# Patient Record
Sex: MALE | Race: WHITE | NOT HISPANIC OR LATINO | URBAN - METROPOLITAN AREA
[De-identification: names, ages, dates, MRNs, and addresses within clinical notes are randomized per-mention and may not be internally consistent; named-entity substitution may affect disease eponyms.]

---

## 2019-01-01 ENCOUNTER — APPOINTMENT (OUTPATIENT)
Dept: RADIOLOGY | Facility: MEDICAL CENTER | Age: 64
DRG: 871 | End: 2019-01-01
Attending: INTERNAL MEDICINE
Payer: MEDICAID

## 2019-01-01 ENCOUNTER — OFFICE VISIT (OUTPATIENT)
Dept: PULMONOLOGY | Facility: HOSPICE | Age: 64
End: 2019-01-01
Payer: MEDICAID

## 2019-01-01 ENCOUNTER — HOSPITAL ENCOUNTER (OUTPATIENT)
Dept: RADIOLOGY | Facility: MEDICAL CENTER | Age: 64
End: 2019-05-23

## 2019-01-01 ENCOUNTER — HOSPITAL ENCOUNTER (OUTPATIENT)
Dept: RADIOLOGY | Facility: MEDICAL CENTER | Age: 64
End: 2019-08-01

## 2019-01-01 ENCOUNTER — TELEPHONE (OUTPATIENT)
Dept: PULMONOLOGY | Facility: HOSPICE | Age: 64
End: 2019-01-01

## 2019-01-01 ENCOUNTER — APPOINTMENT (OUTPATIENT)
Dept: RADIOLOGY | Facility: MEDICAL CENTER | Age: 64
DRG: 871 | End: 2019-01-01
Attending: HOSPITALIST
Payer: MEDICAID

## 2019-01-01 ENCOUNTER — APPOINTMENT (OUTPATIENT)
Dept: CARDIOLOGY | Facility: MEDICAL CENTER | Age: 64
DRG: 871 | End: 2019-01-01
Attending: EMERGENCY MEDICINE
Payer: MEDICAID

## 2019-01-01 ENCOUNTER — HOSPITAL ENCOUNTER (INPATIENT)
Facility: MEDICAL CENTER | Age: 64
LOS: 4 days | DRG: 871 | End: 2019-10-05
Attending: EMERGENCY MEDICINE | Admitting: HOSPITALIST
Payer: MEDICAID

## 2019-01-01 ENCOUNTER — NON-PROVIDER VISIT (OUTPATIENT)
Dept: URGENT CARE | Facility: PHYSICIAN GROUP | Age: 64
End: 2019-01-01

## 2019-01-01 ENCOUNTER — APPOINTMENT (OUTPATIENT)
Dept: RADIOLOGY | Facility: MEDICAL CENTER | Age: 64
DRG: 871 | End: 2019-01-01
Attending: STUDENT IN AN ORGANIZED HEALTH CARE EDUCATION/TRAINING PROGRAM
Payer: MEDICAID

## 2019-01-01 ENCOUNTER — APPOINTMENT (OUTPATIENT)
Dept: RADIOLOGY | Facility: MEDICAL CENTER | Age: 64
DRG: 871 | End: 2019-01-01
Attending: EMERGENCY MEDICINE
Payer: MEDICAID

## 2019-01-01 ENCOUNTER — APPOINTMENT (OUTPATIENT)
Dept: PULMONOLOGY | Facility: HOSPICE | Age: 64
End: 2019-01-01
Payer: MEDICAID

## 2019-01-01 VITALS
WEIGHT: 315 LBS | OXYGEN SATURATION: 98 % | DIASTOLIC BLOOD PRESSURE: 65 MMHG | TEMPERATURE: 98.4 F | BODY MASS INDEX: 41.75 KG/M2 | SYSTOLIC BLOOD PRESSURE: 108 MMHG | HEART RATE: 109 BPM | HEIGHT: 73 IN | RESPIRATION RATE: 13 BRPM

## 2019-01-01 VITALS
RESPIRATION RATE: 16 BRPM | HEART RATE: 96 BPM | HEIGHT: 73 IN | TEMPERATURE: 98.6 F | DIASTOLIC BLOOD PRESSURE: 82 MMHG | SYSTOLIC BLOOD PRESSURE: 126 MMHG | OXYGEN SATURATION: 91 % | WEIGHT: 280 LBS | BODY MASS INDEX: 37.11 KG/M2

## 2019-01-01 VITALS
DIASTOLIC BLOOD PRESSURE: 94 MMHG | WEIGHT: 287 LBS | HEIGHT: 72 IN | RESPIRATION RATE: 18 BRPM | TEMPERATURE: 98.2 F | BODY MASS INDEX: 38.87 KG/M2 | SYSTOLIC BLOOD PRESSURE: 134 MMHG | HEART RATE: 89 BPM | OXYGEN SATURATION: 92 %

## 2019-01-01 DIAGNOSIS — I10 HYPERTENSION, UNSPECIFIED TYPE: ICD-10-CM

## 2019-01-01 DIAGNOSIS — J96.02 ACUTE RESPIRATORY FAILURE WITH HYPOXIA AND HYPERCAPNIA (HCC): ICD-10-CM

## 2019-01-01 DIAGNOSIS — G47.33 OSA (OBSTRUCTIVE SLEEP APNEA): ICD-10-CM

## 2019-01-01 DIAGNOSIS — J43.9 MIXED RESTRICTIVE AND OBSTRUCTIVE LUNG DISEASE (HCC): ICD-10-CM

## 2019-01-01 DIAGNOSIS — R06.02 SOB (SHORTNESS OF BREATH): ICD-10-CM

## 2019-01-01 DIAGNOSIS — E66.9 CLASS 2 OBESITY WITH BODY MASS INDEX (BMI) OF 36.0 TO 36.9 IN ADULT, UNSPECIFIED OBESITY TYPE, UNSPECIFIED WHETHER SERIOUS COMORBIDITY PRESENT: ICD-10-CM

## 2019-01-01 DIAGNOSIS — R60.0 BILATERAL LOWER EXTREMITY EDEMA: ICD-10-CM

## 2019-01-01 DIAGNOSIS — J98.4 MIXED RESTRICTIVE AND OBSTRUCTIVE LUNG DISEASE (HCC): ICD-10-CM

## 2019-01-01 DIAGNOSIS — J96.01 ACUTE RESPIRATORY FAILURE WITH HYPOXIA AND HYPERCAPNIA (HCC): ICD-10-CM

## 2019-01-01 DIAGNOSIS — J96.01 ACUTE RESPIRATORY FAILURE WITH HYPOXIA (HCC): Primary | ICD-10-CM

## 2019-01-01 DIAGNOSIS — R57.9 SHOCK (HCC): ICD-10-CM

## 2019-01-01 DIAGNOSIS — G47.19 EXCESSIVE DAYTIME SLEEPINESS: ICD-10-CM

## 2019-01-01 DIAGNOSIS — Z02.1 PRE-EMPLOYMENT DRUG SCREENING: ICD-10-CM

## 2019-01-01 LAB
ACTION RANGE TRIGGERED IACRT: NO
ACTION RANGE TRIGGERED IACRT: YES
ALBUMIN SERPL BCP-MCNC: 3 G/DL (ref 3.2–4.9)
ALBUMIN SERPL BCP-MCNC: 3.8 G/DL (ref 3.2–4.9)
ALBUMIN/GLOB SERPL: 1.3 G/DL
ALBUMIN/GLOB SERPL: 1.4 G/DL
ALP SERPL-CCNC: 125 U/L (ref 30–99)
ALP SERPL-CCNC: 90 U/L (ref 30–99)
ALT SERPL-CCNC: 14 U/L (ref 2–50)
ALT SERPL-CCNC: 25 U/L (ref 2–50)
AMMONIA PLAS-SCNC: 95 UMOL/L (ref 11–45)
ANION GAP SERPL CALC-SCNC: 10 MMOL/L (ref 0–11.9)
ANION GAP SERPL CALC-SCNC: 10 MMOL/L (ref 0–11.9)
ANION GAP SERPL CALC-SCNC: 12 MMOL/L (ref 0–11.9)
ANION GAP SERPL CALC-SCNC: 14 MMOL/L (ref 0–11.9)
ANION GAP SERPL CALC-SCNC: 15 MMOL/L (ref 0–11.9)
ANION GAP SERPL CALC-SCNC: 17 MMOL/L (ref 0–11.9)
ANISOCYTOSIS BLD QL SMEAR: ABNORMAL
APPEARANCE FLD: NORMAL
APPEARANCE UR: CLEAR
AST SERPL-CCNC: 12 U/L (ref 12–45)
AST SERPL-CCNC: 17 U/L (ref 12–45)
BACTERIA #/AREA URNS HPF: NEGATIVE /HPF
BACTERIA BRONCH AEROBE CULT: ABNORMAL
BASE EXCESS BLDA CALC-SCNC: -10 MMOL/L (ref -4–3)
BASE EXCESS BLDA CALC-SCNC: -2 MMOL/L (ref -4–3)
BASE EXCESS BLDA CALC-SCNC: -3 MMOL/L (ref -4–3)
BASE EXCESS BLDA CALC-SCNC: -4 MMOL/L (ref -4–3)
BASE EXCESS BLDA CALC-SCNC: -5 MMOL/L (ref -4–3)
BASE EXCESS BLDA CALC-SCNC: -6 MMOL/L (ref -4–3)
BASE EXCESS BLDA CALC-SCNC: -6 MMOL/L (ref -4–3)
BASE EXCESS BLDA CALC-SCNC: -7 MMOL/L (ref -4–3)
BASE EXCESS BLDA CALC-SCNC: -8 MMOL/L (ref -4–3)
BASE EXCESS BLDA CALC-SCNC: -9 MMOL/L (ref -4–3)
BASOPHILS # BLD AUTO: 0 % (ref 0–1.8)
BASOPHILS # BLD AUTO: 0.3 % (ref 0–1.8)
BASOPHILS # BLD: 0 K/UL (ref 0–0.12)
BASOPHILS # BLD: 0.04 K/UL (ref 0–0.12)
BILIRUB SERPL-MCNC: 1.3 MG/DL (ref 0.1–1.5)
BILIRUB SERPL-MCNC: 1.6 MG/DL (ref 0.1–1.5)
BILIRUB UR QL STRIP.AUTO: ABNORMAL
BODY FLD TYPE: NORMAL
BODY TEMPERATURE: 34.1 CENTIGRADE
BODY TEMPERATURE: ABNORMAL DEGREES
BUN SERPL-MCNC: 27 MG/DL (ref 8–22)
BUN SERPL-MCNC: 30 MG/DL (ref 8–22)
BUN SERPL-MCNC: 37 MG/DL (ref 8–22)
BUN SERPL-MCNC: 41 MG/DL (ref 8–22)
BUN SERPL-MCNC: 50 MG/DL (ref 8–22)
BUN SERPL-MCNC: 59 MG/DL (ref 8–22)
BURR CELLS BLD QL SMEAR: NORMAL
CA-I BLD ISE-SCNC: 1.03 MMOL/L (ref 1.1–1.3)
CALCIUM SERPL-MCNC: 6.5 MG/DL (ref 8.5–10.5)
CALCIUM SERPL-MCNC: 7 MG/DL (ref 8.5–10.5)
CALCIUM SERPL-MCNC: 7.1 MG/DL (ref 8.5–10.5)
CALCIUM SERPL-MCNC: 7.9 MG/DL (ref 8.5–10.5)
CALCIUM SERPL-MCNC: 8.2 MG/DL (ref 8.5–10.5)
CALCIUM SERPL-MCNC: 8.5 MG/DL (ref 8.5–10.5)
CHLORIDE SERPL-SCNC: 102 MMOL/L (ref 96–112)
CHLORIDE SERPL-SCNC: 103 MMOL/L (ref 96–112)
CHLORIDE SERPL-SCNC: 103 MMOL/L (ref 96–112)
CHLORIDE SERPL-SCNC: 104 MMOL/L (ref 96–112)
CHLORIDE SERPL-SCNC: 105 MMOL/L (ref 96–112)
CHLORIDE SERPL-SCNC: 107 MMOL/L (ref 96–112)
CO2 BLDA-SCNC: 19 MMOL/L (ref 20–33)
CO2 BLDA-SCNC: 19 MMOL/L (ref 20–33)
CO2 BLDA-SCNC: 20 MMOL/L (ref 20–33)
CO2 BLDA-SCNC: 21 MMOL/L (ref 20–33)
CO2 BLDA-SCNC: 22 MMOL/L (ref 20–33)
CO2 BLDA-SCNC: 23 MMOL/L (ref 20–33)
CO2 BLDA-SCNC: 23 MMOL/L (ref 20–33)
CO2 BLDA-SCNC: 24 MMOL/L (ref 20–33)
CO2 BLDA-SCNC: 25 MMOL/L (ref 20–33)
CO2 BLDA-SCNC: 25 MMOL/L (ref 20–33)
CO2 BLDA-SCNC: 26 MMOL/L (ref 20–33)
CO2 BLDA-SCNC: 27 MMOL/L (ref 20–33)
CO2 SERPL-SCNC: 18 MMOL/L (ref 20–33)
CO2 SERPL-SCNC: 20 MMOL/L (ref 20–33)
CO2 SERPL-SCNC: 21 MMOL/L (ref 20–33)
CO2 SERPL-SCNC: 22 MMOL/L (ref 20–33)
CO2 SERPL-SCNC: 22 MMOL/L (ref 20–33)
CO2 SERPL-SCNC: 23 MMOL/L (ref 20–33)
COLOR FLD: NORMAL
COLOR UR: ABNORMAL
CORTIS SERPL-MCNC: 32 UG/DL (ref 0–23)
CREAT SERPL-MCNC: 0.94 MG/DL (ref 0.5–1.4)
CREAT SERPL-MCNC: 1.71 MG/DL (ref 0.5–1.4)
CREAT SERPL-MCNC: 2.21 MG/DL (ref 0.5–1.4)
CREAT SERPL-MCNC: 2.29 MG/DL (ref 0.5–1.4)
CREAT SERPL-MCNC: 2.82 MG/DL (ref 0.5–1.4)
CREAT SERPL-MCNC: 3.42 MG/DL (ref 0.5–1.4)
CRP SERPL HS-MCNC: 28.09 MG/DL (ref 0–0.75)
CSF COMMENTS 1658: NORMAL
CYTOLOGY REG CYTOL: NORMAL
EKG IMPRESSION: NORMAL
EOSINOPHIL # BLD AUTO: 0 K/UL (ref 0–0.51)
EOSINOPHIL # BLD AUTO: 0.15 K/UL (ref 0–0.51)
EOSINOPHIL NFR BLD: 0 % (ref 0–6.9)
EOSINOPHIL NFR BLD: 0.9 % (ref 0–6.9)
EPI CELLS #/AREA URNS HPF: NEGATIVE /HPF
ERYTHROCYTE [DISTWIDTH] IN BLOOD BY AUTOMATED COUNT: 63.7 FL (ref 35.9–50)
ERYTHROCYTE [DISTWIDTH] IN BLOOD BY AUTOMATED COUNT: 65.8 FL (ref 35.9–50)
ERYTHROCYTE [DISTWIDTH] IN BLOOD BY AUTOMATED COUNT: 70.3 FL (ref 35.9–50)
ERYTHROCYTE [DISTWIDTH] IN BLOOD BY AUTOMATED COUNT: 70.5 FL (ref 35.9–50)
ERYTHROCYTE [DISTWIDTH] IN BLOOD BY AUTOMATED COUNT: 70.5 FL (ref 35.9–50)
FLUAV RNA SPEC QL NAA+PROBE: NEGATIVE
FLUBV RNA SPEC QL NAA+PROBE: NEGATIVE
FUNGUS SPEC FUNGUS STN: NORMAL
FUNGUS SPEC FUNGUS STN: NORMAL
GLOBULIN SER CALC-MCNC: 2.3 G/DL (ref 1.9–3.5)
GLOBULIN SER CALC-MCNC: 2.7 G/DL (ref 1.9–3.5)
GLUCOSE BLD-MCNC: 127 MG/DL (ref 65–99)
GLUCOSE BLD-MCNC: 127 MG/DL (ref 65–99)
GLUCOSE BLD-MCNC: 128 MG/DL (ref 65–99)
GLUCOSE BLD-MCNC: 132 MG/DL (ref 65–99)
GLUCOSE BLD-MCNC: 138 MG/DL (ref 65–99)
GLUCOSE BLD-MCNC: 142 MG/DL (ref 65–99)
GLUCOSE BLD-MCNC: 146 MG/DL (ref 65–99)
GLUCOSE BLD-MCNC: 147 MG/DL (ref 65–99)
GLUCOSE BLD-MCNC: 152 MG/DL (ref 65–99)
GLUCOSE BLD-MCNC: 155 MG/DL (ref 65–99)
GLUCOSE BLD-MCNC: 156 MG/DL (ref 65–99)
GLUCOSE BLD-MCNC: 159 MG/DL (ref 65–99)
GLUCOSE BLD-MCNC: 160 MG/DL (ref 65–99)
GLUCOSE BLD-MCNC: 162 MG/DL (ref 65–99)
GLUCOSE BLD-MCNC: 171 MG/DL (ref 65–99)
GLUCOSE BLD-MCNC: 225 MG/DL (ref 65–99)
GLUCOSE SERPL-MCNC: 148 MG/DL (ref 65–99)
GLUCOSE SERPL-MCNC: 154 MG/DL (ref 65–99)
GLUCOSE SERPL-MCNC: 154 MG/DL (ref 65–99)
GLUCOSE SERPL-MCNC: 164 MG/DL (ref 65–99)
GLUCOSE SERPL-MCNC: 166 MG/DL (ref 65–99)
GLUCOSE SERPL-MCNC: 217 MG/DL (ref 65–99)
GLUCOSE UR STRIP.AUTO-MCNC: NEGATIVE MG/DL
GRAM STN SPEC: ABNORMAL
GRAM STN SPEC: NORMAL
GRAM STN SPEC: NORMAL
HAV IGM SERPL QL IA: NEGATIVE
HBV CORE IGM SER QL: NEGATIVE
HBV SURFACE AB SERPL IA-ACNC: <3.1 MIU/ML (ref 0–10)
HBV SURFACE AG SER QL: NEGATIVE
HCO3 BLDA-SCNC: 17.4 MMOL/L (ref 17–25)
HCO3 BLDA-SCNC: 17.7 MMOL/L (ref 17–25)
HCO3 BLDA-SCNC: 18.6 MMOL/L (ref 17–25)
HCO3 BLDA-SCNC: 18.6 MMOL/L (ref 17–25)
HCO3 BLDA-SCNC: 18.7 MMOL/L (ref 17–25)
HCO3 BLDA-SCNC: 19 MMOL/L (ref 17–25)
HCO3 BLDA-SCNC: 19 MMOL/L (ref 17–25)
HCO3 BLDA-SCNC: 19.2 MMOL/L (ref 17–25)
HCO3 BLDA-SCNC: 19.2 MMOL/L (ref 17–25)
HCO3 BLDA-SCNC: 19.4 MMOL/L (ref 17–25)
HCO3 BLDA-SCNC: 19.6 MMOL/L (ref 17–25)
HCO3 BLDA-SCNC: 19.6 MMOL/L (ref 17–25)
HCO3 BLDA-SCNC: 20.6 MMOL/L (ref 17–25)
HCO3 BLDA-SCNC: 20.7 MMOL/L (ref 17–25)
HCO3 BLDA-SCNC: 20.7 MMOL/L (ref 17–25)
HCO3 BLDA-SCNC: 20.8 MMOL/L (ref 17–25)
HCO3 BLDA-SCNC: 22.3 MMOL/L (ref 17–25)
HCO3 BLDA-SCNC: 22.6 MMOL/L (ref 17–25)
HCO3 BLDA-SCNC: 23.4 MMOL/L (ref 17–25)
HCO3 BLDA-SCNC: 23.6 MMOL/L (ref 17–25)
HCO3 BLDA-SCNC: 24.1 MMOL/L (ref 17–25)
HCO3 BLDA-SCNC: 24.3 MMOL/L (ref 17–25)
HCO3 BLDA-SCNC: 24.6 MMOL/L (ref 17–25)
HCO3 BLDA-SCNC: 25.1 MMOL/L (ref 17–25)
HCT VFR BLD AUTO: 33.6 % (ref 42–52)
HCT VFR BLD AUTO: 37.6 % (ref 42–52)
HCT VFR BLD AUTO: 41.3 % (ref 42–52)
HCT VFR BLD AUTO: 53.6 % (ref 42–52)
HCT VFR BLD AUTO: 55.8 % (ref 42–52)
HCT VFR BLD CALC: 57 % (ref 42–52)
HCV AB SER QL: NEGATIVE
HGB BLD-MCNC: 10 G/DL (ref 14–18)
HGB BLD-MCNC: 11.3 G/DL (ref 14–18)
HGB BLD-MCNC: 12 G/DL (ref 14–18)
HGB BLD-MCNC: 17.1 G/DL (ref 14–18)
HGB BLD-MCNC: 18 G/DL (ref 14–18)
HGB BLD-MCNC: 19.4 G/DL (ref 14–18)
HOROWITZ INDEX BLDA+IHG-RTO: 100 MM[HG]
HOROWITZ INDEX BLDA+IHG-RTO: 105 MM[HG]
HOROWITZ INDEX BLDA+IHG-RTO: 109 MM[HG]
HOROWITZ INDEX BLDA+IHG-RTO: 110 MM[HG]
HOROWITZ INDEX BLDA+IHG-RTO: 144 MM[HG]
HOROWITZ INDEX BLDA+IHG-RTO: 150 MM[HG]
HOROWITZ INDEX BLDA+IHG-RTO: 38 MM[HG]
HOROWITZ INDEX BLDA+IHG-RTO: 45 MM[HG]
HOROWITZ INDEX BLDA+IHG-RTO: 56 MM[HG]
HOROWITZ INDEX BLDA+IHG-RTO: 63 MM[HG]
HOROWITZ INDEX BLDA+IHG-RTO: 67 MM[HG]
HOROWITZ INDEX BLDA+IHG-RTO: 75 MM[HG]
HOROWITZ INDEX BLDA+IHG-RTO: 78 MM[HG]
HOROWITZ INDEX BLDA+IHG-RTO: 79 MM[HG]
HOROWITZ INDEX BLDA+IHG-RTO: 80 MM[HG]
HOROWITZ INDEX BLDA+IHG-RTO: 81 MM[HG]
HOROWITZ INDEX BLDA+IHG-RTO: 83 MM[HG]
HOROWITZ INDEX BLDA+IHG-RTO: 83 MM[HG]
HOROWITZ INDEX BLDA+IHG-RTO: 88 MM[HG]
HOROWITZ INDEX BLDA+IHG-RTO: 89 MM[HG]
HOROWITZ INDEX BLDA+IHG-RTO: 94 MM[HG]
HOROWITZ INDEX BLDA+IHG-RTO: 95 MM[HG]
HOROWITZ INDEX BLDA+IHG-RTO: 96 MM[HG]
HYALINE CASTS #/AREA URNS LPF: ABNORMAL /LPF
HYPOCHROMIA BLD QL SMEAR: ABNORMAL
IMM GRANULOCYTES # BLD AUTO: 0.15 K/UL (ref 0–0.11)
IMM GRANULOCYTES NFR BLD AUTO: 1 % (ref 0–0.9)
INHALED O2 FLOW RATE: 10 L/MIN (ref 2–10)
INR PPP: 1.09 (ref 0.87–1.13)
INST. QUALIFIED PATIENT IIQPT: YES
KETONES UR STRIP.AUTO-MCNC: ABNORMAL MG/DL
LACTATE BLD-SCNC: 2.8 MMOL/L (ref 0.5–2)
LACTATE BLD-SCNC: 2.9 MMOL/L (ref 0.5–2)
LACTATE BLD-SCNC: 3.4 MMOL/L (ref 0.5–2)
LACTATE BLD-SCNC: 3.4 MMOL/L (ref 0.5–2)
LEUKOCYTE ESTERASE UR QL STRIP.AUTO: NEGATIVE
LV EJECT FRACT  99904: 75
LYMPHOCYTES # BLD AUTO: 0.79 K/UL (ref 1–4.8)
LYMPHOCYTES # BLD AUTO: 0.8 K/UL (ref 1–4.8)
LYMPHOCYTES # BLD AUTO: 1.2 K/UL (ref 1–4.8)
LYMPHOCYTES # BLD AUTO: 1.46 K/UL (ref 1–4.8)
LYMPHOCYTES # BLD AUTO: 2.03 K/UL (ref 1–4.8)
LYMPHOCYTES NFR BLD: 12.3 % (ref 22–41)
LYMPHOCYTES NFR BLD: 3.4 % (ref 22–41)
LYMPHOCYTES NFR BLD: 5.4 % (ref 22–41)
LYMPHOCYTES NFR BLD: 7.9 % (ref 22–41)
LYMPHOCYTES NFR BLD: 8.8 % (ref 22–41)
MACROCYTES BLD QL SMEAR: ABNORMAL
MACROCYTES BLD QL SMEAR: ABNORMAL
MAGNESIUM SERPL-MCNC: 1.5 MG/DL (ref 1.5–2.5)
MAGNESIUM SERPL-MCNC: 1.7 MG/DL (ref 1.5–2.5)
MAGNESIUM SERPL-MCNC: 1.8 MG/DL (ref 1.5–2.5)
MAGNESIUM SERPL-MCNC: 1.8 MG/DL (ref 1.5–2.5)
MAGNESIUM SERPL-MCNC: 1.9 MG/DL (ref 1.5–2.5)
MANUAL DIFF BLD: ABNORMAL
MANUAL DIFF BLD: ABNORMAL
MANUAL DIFF BLD: NORMAL
MANUAL DIFF BLD: NORMAL
MCH RBC QN AUTO: 32.4 PG (ref 27–33)
MCH RBC QN AUTO: 32.7 PG (ref 27–33)
MCH RBC QN AUTO: 33.2 PG (ref 27–33)
MCH RBC QN AUTO: 33.9 PG (ref 27–33)
MCH RBC QN AUTO: 34 PG (ref 27–33)
MCHC RBC AUTO-ENTMCNC: 29.4 G/DL (ref 33.7–35.3)
MCHC RBC AUTO-ENTMCNC: 29.8 G/DL (ref 33.7–35.3)
MCHC RBC AUTO-ENTMCNC: 30.1 G/DL (ref 33.7–35.3)
MCHC RBC AUTO-ENTMCNC: 31.9 G/DL (ref 33.7–35.3)
MCHC RBC AUTO-ENTMCNC: 32.3 G/DL (ref 33.7–35.3)
MCV RBC AUTO: 105.1 FL (ref 81.4–97.8)
MCV RBC AUTO: 106.6 FL (ref 81.4–97.8)
MCV RBC AUTO: 109.8 FL (ref 81.4–97.8)
MCV RBC AUTO: 110.3 FL (ref 81.4–97.8)
MCV RBC AUTO: 110.6 FL (ref 81.4–97.8)
METAMYELOCYTES NFR BLD MANUAL: 1.8 %
METAMYELOCYTES NFR BLD MANUAL: 1.8 %
MICRO URNS: ABNORMAL
MICROCYTES BLD QL SMEAR: ABNORMAL
MONOCYTES # BLD AUTO: 0.58 K/UL (ref 0–0.85)
MONOCYTES # BLD AUTO: 0.9 K/UL (ref 0–0.85)
MONOCYTES # BLD AUTO: 0.93 K/UL (ref 0–0.85)
MONOCYTES # BLD AUTO: 1.18 K/UL (ref 0–0.85)
MONOCYTES # BLD AUTO: 1.37 K/UL (ref 0–0.85)
MONOCYTES NFR BLD AUTO: 3.5 % (ref 0–13.4)
MONOCYTES NFR BLD AUTO: 5.9 % (ref 0–13.4)
MONOCYTES NFR BLD AUTO: 6.1 % (ref 0–13.4)
MONOCYTES NFR BLD AUTO: 6.1 % (ref 0–13.4)
MONOCYTES NFR BLD AUTO: 7.1 % (ref 0–13.4)
MORPHOLOGY BLD-IMP: NORMAL
NEUTROPHILS # BLD AUTO: 12.8 K/UL (ref 1.82–7.42)
NEUTROPHILS # BLD AUTO: 12.96 K/UL (ref 1.82–7.42)
NEUTROPHILS # BLD AUTO: 13.45 K/UL (ref 1.82–7.42)
NEUTROPHILS # BLD AUTO: 13.96 K/UL (ref 1.82–7.42)
NEUTROPHILS # BLD AUTO: 21.13 K/UL (ref 1.82–7.42)
NEUTROPHILS NFR BLD: 71 % (ref 44–72)
NEUTROPHILS NFR BLD: 75.4 % (ref 44–72)
NEUTROPHILS NFR BLD: 78.8 % (ref 44–72)
NEUTROPHILS NFR BLD: 84.1 % (ref 44–72)
NEUTROPHILS NFR BLD: 87.2 % (ref 44–72)
NEUTS BAND NFR BLD MANUAL: 11.9 % (ref 0–10)
NEUTS BAND NFR BLD MANUAL: 13.2 % (ref 0–10)
NEUTS BAND NFR BLD MANUAL: 6.1 % (ref 0–10)
NITRITE UR QL STRIP.AUTO: NEGATIVE
NRBC # BLD AUTO: 0 K/UL
NRBC # BLD AUTO: 0 K/UL
NRBC # BLD AUTO: 0.02 K/UL
NRBC # BLD AUTO: 0.03 K/UL
NRBC # BLD AUTO: 0.03 K/UL
NRBC BLD-RTO: 0 /100 WBC
NRBC BLD-RTO: 0 /100 WBC
NRBC BLD-RTO: 0.1 /100 WBC
NRBC BLD-RTO: 0.2 /100 WBC
NRBC BLD-RTO: 0.2 /100 WBC
NT-PROBNP SERPL IA-MCNC: 866 PG/ML (ref 0–125)
O2/TOTAL GAS SETTING VFR VENT: 100 %
O2/TOTAL GAS SETTING VFR VENT: 40 %
O2/TOTAL GAS SETTING VFR VENT: 70 %
O2/TOTAL GAS SETTING VFR VENT: 80 %
O2/TOTAL GAS SETTING VFR VENT: 90 %
OVALOCYTES BLD QL SMEAR: NORMAL
PCO2 BLDA: 33.5 MMHG (ref 26–37)
PCO2 BLDA: 36 MMHG (ref 26–37)
PCO2 BLDA: 36.4 MMHG (ref 26–37)
PCO2 BLDA: 37.2 MMHG (ref 26–37)
PCO2 BLDA: 37.7 MMHG (ref 26–37)
PCO2 BLDA: 38.7 MMHG (ref 26–37)
PCO2 BLDA: 38.7 MMHG (ref 26–37)
PCO2 BLDA: 40.2 MMHG (ref 26–37)
PCO2 BLDA: 40.4 MMHG (ref 26–37)
PCO2 BLDA: 40.8 MMHG (ref 26–37)
PCO2 BLDA: 40.9 MMHG (ref 26–37)
PCO2 BLDA: 41.7 MMHG (ref 26–37)
PCO2 BLDA: 41.7 MMHG (ref 26–37)
PCO2 BLDA: 43.7 MMHG (ref 26–37)
PCO2 BLDA: 44 MMHG (ref 26–37)
PCO2 BLDA: 44.2 MMHG (ref 26–37)
PCO2 BLDA: 48.8 MMHG (ref 26–37)
PCO2 BLDA: 49.3 MMHG (ref 26–37)
PCO2 BLDA: 51.3 MMHG (ref 26–37)
PCO2 BLDA: 52.9 MMHG (ref 26–37)
PCO2 BLDA: 53.3 MMHG (ref 26–37)
PCO2 BLDA: 57.4 MMHG (ref 26–37)
PCO2 BLDA: 58.9 MMHG (ref 26–37)
PCO2 BLDA: 76.6 MMHG (ref 26–37)
PCO2 TEMP ADJ BLDA: 29.5 MMHG (ref 26–37)
PCO2 TEMP ADJ BLDA: 35.3 MMHG (ref 26–37)
PCO2 TEMP ADJ BLDA: 35.4 MMHG (ref 26–37)
PCO2 TEMP ADJ BLDA: 36.3 MMHG (ref 26–37)
PCO2 TEMP ADJ BLDA: 37.9 MMHG (ref 26–37)
PCO2 TEMP ADJ BLDA: 38.1 MMHG (ref 26–37)
PCO2 TEMP ADJ BLDA: 38.3 MMHG (ref 26–37)
PCO2 TEMP ADJ BLDA: 38.9 MMHG (ref 26–37)
PCO2 TEMP ADJ BLDA: 39.7 MMHG (ref 26–37)
PCO2 TEMP ADJ BLDA: 40.2 MMHG (ref 26–37)
PCO2 TEMP ADJ BLDA: 40.3 MMHG (ref 26–37)
PCO2 TEMP ADJ BLDA: 41.2 MMHG (ref 26–37)
PCO2 TEMP ADJ BLDA: 41.4 MMHG (ref 26–37)
PCO2 TEMP ADJ BLDA: 42 MMHG (ref 26–37)
PCO2 TEMP ADJ BLDA: 42.8 MMHG (ref 26–37)
PCO2 TEMP ADJ BLDA: 43.9 MMHG (ref 26–37)
PCO2 TEMP ADJ BLDA: 50.6 MMHG (ref 26–37)
PCO2 TEMP ADJ BLDA: 50.8 MMHG (ref 26–37)
PCO2 TEMP ADJ BLDA: 51.2 MMHG (ref 26–37)
PCO2 TEMP ADJ BLDA: 52 MMHG (ref 26–37)
PCO2 TEMP ADJ BLDA: 53.1 MMHG (ref 26–37)
PCO2 TEMP ADJ BLDA: 54.1 MMHG (ref 26–37)
PCO2 TEMP ADJ BLDA: 59.1 MMHG (ref 26–37)
PCO2 TEMP ADJ BLDA: 76.3 MMHG (ref 26–37)
PH BLDA: 7.12 [PH] (ref 7.4–7.5)
PH BLDA: 7.17 [PH] (ref 7.4–7.5)
PH BLDA: 7.17 [PH] (ref 7.4–7.5)
PH BLDA: 7.18 [PH] (ref 7.4–7.5)
PH BLDA: 7.19 [PH] (ref 7.4–7.5)
PH BLDA: 7.22 [PH] (ref 7.4–7.5)
PH BLDA: 7.25 [PH] (ref 7.4–7.5)
PH BLDA: 7.26 [PH] (ref 7.4–7.5)
PH BLDA: 7.26 [PH] (ref 7.4–7.5)
PH BLDA: 7.27 [PH] (ref 7.4–7.5)
PH BLDA: 7.27 [PH] (ref 7.4–7.5)
PH BLDA: 7.28 [PH] (ref 7.4–7.5)
PH BLDA: 7.28 [PH] (ref 7.4–7.5)
PH BLDA: 7.3 [PH] (ref 7.4–7.5)
PH BLDA: 7.32 [PH] (ref 7.4–7.5)
PH BLDA: 7.34 [PH] (ref 7.4–7.5)
PH BLDA: 7.35 [PH] (ref 7.4–7.5)
PH BLDA: 7.37 [PH] (ref 7.4–7.5)
PH BLDA: 7.38 [PH] (ref 7.4–7.5)
PH BLDA: 7.39 [PH] (ref 7.4–7.5)
PH TEMP ADJ BLDA: 7.12 [PH] (ref 7.4–7.5)
PH TEMP ADJ BLDA: 7.17 [PH] (ref 7.4–7.5)
PH TEMP ADJ BLDA: 7.18 [PH] (ref 7.4–7.5)
PH TEMP ADJ BLDA: 7.18 [PH] (ref 7.4–7.5)
PH TEMP ADJ BLDA: 7.19 [PH] (ref 7.4–7.5)
PH TEMP ADJ BLDA: 7.22 [PH] (ref 7.4–7.5)
PH TEMP ADJ BLDA: 7.25 [PH] (ref 7.4–7.5)
PH TEMP ADJ BLDA: 7.25 [PH] (ref 7.4–7.5)
PH TEMP ADJ BLDA: 7.26 [PH] (ref 7.4–7.5)
PH TEMP ADJ BLDA: 7.27 [PH] (ref 7.4–7.5)
PH TEMP ADJ BLDA: 7.28 [PH] (ref 7.4–7.5)
PH TEMP ADJ BLDA: 7.3 [PH] (ref 7.4–7.5)
PH TEMP ADJ BLDA: 7.31 [PH] (ref 7.4–7.5)
PH TEMP ADJ BLDA: 7.32 [PH] (ref 7.4–7.5)
PH TEMP ADJ BLDA: 7.32 [PH] (ref 7.4–7.5)
PH TEMP ADJ BLDA: 7.34 [PH] (ref 7.4–7.5)
PH TEMP ADJ BLDA: 7.35 [PH] (ref 7.4–7.5)
PH TEMP ADJ BLDA: 7.35 [PH] (ref 7.4–7.5)
PH TEMP ADJ BLDA: 7.36 [PH] (ref 7.4–7.5)
PH TEMP ADJ BLDA: 7.39 [PH] (ref 7.4–7.5)
PH TEMP ADJ BLDA: 7.39 [PH] (ref 7.4–7.5)
PH TEMP ADJ BLDA: 7.42 [PH] (ref 7.4–7.5)
PH UR STRIP.AUTO: 5 [PH] (ref 5–8)
PHOSPHATE SERPL-MCNC: 3.7 MG/DL (ref 2.5–4.5)
PHOSPHATE SERPL-MCNC: 4.6 MG/DL (ref 2.5–4.5)
PHOSPHATE SERPL-MCNC: 5.7 MG/DL (ref 2.5–4.5)
PHOSPHATE SERPL-MCNC: 6.5 MG/DL (ref 2.5–4.5)
PHOSPHATE SERPL-MCNC: 6.7 MG/DL (ref 2.5–4.5)
PLATELET # BLD AUTO: 135 K/UL (ref 164–446)
PLATELET # BLD AUTO: 196 K/UL (ref 164–446)
PLATELET # BLD AUTO: 41 K/UL (ref 164–446)
PLATELET # BLD AUTO: 56 K/UL (ref 164–446)
PLATELET # BLD AUTO: 96 K/UL (ref 164–446)
PLATELET BLD QL SMEAR: NORMAL
PLATELETS.RETICULATED NFR BLD AUTO: 9.8 K/UL (ref 0.6–13.1)
PMV BLD AUTO: 10.4 FL (ref 9–12.9)
PMV BLD AUTO: 10.7 FL (ref 9–12.9)
PMV BLD AUTO: 11 FL (ref 9–12.9)
PMV BLD AUTO: 11.9 FL (ref 9–12.9)
PMV BLD AUTO: 9.6 FL (ref 9–12.9)
PO2 BLDA: 101 MMHG (ref 64–87)
PO2 BLDA: 105 MMHG (ref 64–87)
PO2 BLDA: 109 MMHG (ref 64–87)
PO2 BLDA: 110 MMHG (ref 64–87)
PO2 BLDA: 38 MMHG (ref 64–87)
PO2 BLDA: 44 MMHG (ref 64–87)
PO2 BLDA: 45 MMHG (ref 64–87)
PO2 BLDA: 56 MMHG (ref 64–87)
PO2 BLDA: 58 MMHG (ref 64–87)
PO2 BLDA: 60 MMHG (ref 64–87)
PO2 BLDA: 60 MMHG (ref 64–87)
PO2 BLDA: 63 MMHG (ref 64–87)
PO2 BLDA: 65 MMHG (ref 64–87)
PO2 BLDA: 67 MMHG (ref 64–87)
PO2 BLDA: 67 MMHG (ref 64–87)
PO2 BLDA: 75 MMHG (ref 64–87)
PO2 BLDA: 78 MMHG (ref 64–87)
PO2 BLDA: 79.1 MMHG (ref 64–87)
PO2 BLDA: 80 MMHG (ref 64–87)
PO2 BLDA: 83 MMHG (ref 64–87)
PO2 BLDA: 88 MMHG (ref 64–87)
PO2 BLDA: 89 MMHG (ref 64–87)
PO2 BLDA: 90 MMHG (ref 64–87)
PO2 BLDA: 95 MMHG (ref 64–87)
PO2 TEMP ADJ BLDA: 102 MMHG (ref 64–87)
PO2 TEMP ADJ BLDA: 104 MMHG (ref 64–87)
PO2 TEMP ADJ BLDA: 117 MMHG (ref 64–87)
PO2 TEMP ADJ BLDA: 39 MMHG (ref 64–87)
PO2 TEMP ADJ BLDA: 43 MMHG (ref 64–87)
PO2 TEMP ADJ BLDA: 44 MMHG (ref 64–87)
PO2 TEMP ADJ BLDA: 54 MMHG (ref 64–87)
PO2 TEMP ADJ BLDA: 55 MMHG (ref 64–87)
PO2 TEMP ADJ BLDA: 56 MMHG (ref 64–87)
PO2 TEMP ADJ BLDA: 60 MMHG (ref 64–87)
PO2 TEMP ADJ BLDA: 64 MMHG (ref 64–87)
PO2 TEMP ADJ BLDA: 65 MMHG (ref 64–87)
PO2 TEMP ADJ BLDA: 65.3 MMHG (ref 64–87)
PO2 TEMP ADJ BLDA: 67 MMHG (ref 64–87)
PO2 TEMP ADJ BLDA: 70 MMHG (ref 64–87)
PO2 TEMP ADJ BLDA: 72 MMHG (ref 64–87)
PO2 TEMP ADJ BLDA: 75 MMHG (ref 64–87)
PO2 TEMP ADJ BLDA: 75 MMHG (ref 64–87)
PO2 TEMP ADJ BLDA: 76 MMHG (ref 64–87)
PO2 TEMP ADJ BLDA: 84 MMHG (ref 64–87)
PO2 TEMP ADJ BLDA: 85 MMHG (ref 64–87)
PO2 TEMP ADJ BLDA: 93 MMHG (ref 64–87)
PO2 TEMP ADJ BLDA: 95 MMHG (ref 64–87)
PO2 TEMP ADJ BLDA: 97 MMHG (ref 64–87)
POIKILOCYTOSIS BLD QL SMEAR: NORMAL
POLYCHROMASIA BLD QL SMEAR: NORMAL
POLYCHROMASIA BLD QL SMEAR: NORMAL
POTASSIUM BLD-SCNC: 4.8 MMOL/L (ref 3.6–5.5)
POTASSIUM SERPL-SCNC: 3.6 MMOL/L (ref 3.6–5.5)
POTASSIUM SERPL-SCNC: 4.7 MMOL/L (ref 3.6–5.5)
POTASSIUM SERPL-SCNC: 4.8 MMOL/L (ref 3.6–5.5)
POTASSIUM SERPL-SCNC: 6.5 MMOL/L (ref 3.6–5.5)
PREALB SERPL-MCNC: 9 MG/DL (ref 18–38)
PROCALCITONIN SERPL-MCNC: <0.05 NG/ML
PROT SERPL-MCNC: 5.3 G/DL (ref 6–8.2)
PROT SERPL-MCNC: 6.5 G/DL (ref 6–8.2)
PROT UR QL STRIP: 30 MG/DL
PROTHROMBIN TIME: 14.4 SEC (ref 12–14.6)
RBC # BLD AUTO: 3.06 M/UL (ref 4.7–6.1)
RBC # BLD AUTO: 3.4 M/UL (ref 4.7–6.1)
RBC # BLD AUTO: 3.7 M/UL (ref 4.7–6.1)
RBC # BLD AUTO: 5.03 M/UL (ref 4.7–6.1)
RBC # BLD AUTO: 5.31 M/UL (ref 4.7–6.1)
RBC # FLD: NORMAL CELLS/UL
RBC # URNS HPF: ABNORMAL /HPF
RBC BLD AUTO: PRESENT
RBC UR QL AUTO: NEGATIVE
RHODAMINE-AURAMINE STN SPEC: NORMAL
SAO2 % BLDA: 61 % (ref 93–99)
SAO2 % BLDA: 64 % (ref 93–99)
SAO2 % BLDA: 73 % (ref 93–99)
SAO2 % BLDA: 82 % (ref 93–99)
SAO2 % BLDA: 84 % (ref 93–99)
SAO2 % BLDA: 88 % (ref 93–99)
SAO2 % BLDA: 89 % (ref 93–99)
SAO2 % BLDA: 91 % (ref 93–99)
SAO2 % BLDA: 92 % (ref 93–99)
SAO2 % BLDA: 92 % (ref 93–99)
SAO2 % BLDA: 93 % (ref 93–99)
SAO2 % BLDA: 94 % (ref 93–99)
SAO2 % BLDA: 94 % (ref 93–99)
SAO2 % BLDA: 94.6 % (ref 93–99)
SAO2 % BLDA: 95 % (ref 93–99)
SAO2 % BLDA: 96 % (ref 93–99)
SAO2 % BLDA: 97 % (ref 93–99)
SAO2 % BLDA: 98 % (ref 93–99)
SAO2 % BLDA: 98 % (ref 93–99)
SCCMEC + MECA PNL NOSE NAA+PROBE: NEGATIVE
SCCMEC + MECA PNL NOSE NAA+PROBE: NEGATIVE
SIGNIFICANT IND 70042: ABNORMAL
SIGNIFICANT IND 70042: NORMAL
SITE SITE: ABNORMAL
SITE SITE: NORMAL
SODIUM BLD-SCNC: 141 MMOL/L (ref 135–145)
SODIUM SERPL-SCNC: 136 MMOL/L (ref 135–145)
SODIUM SERPL-SCNC: 136 MMOL/L (ref 135–145)
SODIUM SERPL-SCNC: 137 MMOL/L (ref 135–145)
SODIUM SERPL-SCNC: 139 MMOL/L (ref 135–145)
SODIUM SERPL-SCNC: 140 MMOL/L (ref 135–145)
SODIUM SERPL-SCNC: 140 MMOL/L (ref 135–145)
SOURCE SOURCE: ABNORMAL
SOURCE SOURCE: NORMAL
SP GR UR STRIP.AUTO: 1.03
SPECIMEN DRAWN FROM PATIENT: ABNORMAL
TRIGL SERPL-MCNC: 140 MG/DL (ref 0–149)
TRIGL SERPL-MCNC: 166 MG/DL (ref 0–149)
TROPONIN T SERPL-MCNC: 15 NG/L (ref 6–19)
UROBILINOGEN UR STRIP.AUTO-MCNC: 1 MG/DL
WBC # BLD AUTO: 14.9 K/UL (ref 4.8–10.8)
WBC # BLD AUTO: 15.2 K/UL (ref 4.8–10.8)
WBC # BLD AUTO: 16.5 K/UL (ref 4.8–10.8)
WBC # BLD AUTO: 16.6 K/UL (ref 4.8–10.8)
WBC # BLD AUTO: 23.3 K/UL (ref 4.8–10.8)
WBC # FLD: NORMAL CELLS/UL
WBC #/AREA URNS HPF: ABNORMAL /HPF

## 2019-01-01 PROCEDURE — 99292 CRITICAL CARE ADDL 30 MIN: CPT | Mod: 25 | Performed by: INTERNAL MEDICINE

## 2019-01-01 PROCEDURE — 87640 STAPH A DNA AMP PROBE: CPT

## 2019-01-01 PROCEDURE — 37799 UNLISTED PX VASCULAR SURGERY: CPT

## 2019-01-01 PROCEDURE — 80048 BASIC METABOLIC PNL TOTAL CA: CPT

## 2019-01-01 PROCEDURE — 99291 CRITICAL CARE FIRST HOUR: CPT | Performed by: INTERNAL MEDICINE

## 2019-01-01 PROCEDURE — 82962 GLUCOSE BLOOD TEST: CPT

## 2019-01-01 PROCEDURE — 770022 HCHG ROOM/CARE - ICU (200)

## 2019-01-01 PROCEDURE — 02HV33Z INSERTION OF INFUSION DEVICE INTO SUPERIOR VENA CAVA, PERCUTANEOUS APPROACH: ICD-10-PCS | Performed by: INTERNAL MEDICINE

## 2019-01-01 PROCEDURE — 700111 HCHG RX REV CODE 636 W/ 250 OVERRIDE (IP): Performed by: STUDENT IN AN ORGANIZED HEALTH CARE EDUCATION/TRAINING PROGRAM

## 2019-01-01 PROCEDURE — 36620 INSERTION CATHETER ARTERY: CPT | Performed by: INTERNAL MEDICINE

## 2019-01-01 PROCEDURE — 99292 CRITICAL CARE ADDL 30 MIN: CPT | Performed by: INTERNAL MEDICINE

## 2019-01-01 PROCEDURE — A9270 NON-COVERED ITEM OR SERVICE: HCPCS | Performed by: INTERNAL MEDICINE

## 2019-01-01 PROCEDURE — 0BCB8ZZ EXTIRPATION OF MATTER FROM LEFT LOWER LOBE BRONCHUS, VIA NATURAL OR ARTIFICIAL OPENING ENDOSCOPIC: ICD-10-PCS | Performed by: INTERNAL MEDICINE

## 2019-01-01 PROCEDURE — 31624 DX BRONCHOSCOPE/LAVAGE: CPT | Mod: 51 | Performed by: INTERNAL MEDICINE

## 2019-01-01 PROCEDURE — 87116 MYCOBACTERIA CULTURE: CPT

## 2019-01-01 PROCEDURE — 85007 BL SMEAR W/DIFF WBC COUNT: CPT

## 2019-01-01 PROCEDURE — 87040 BLOOD CULTURE FOR BACTERIA: CPT | Mod: 91

## 2019-01-01 PROCEDURE — 87070 CULTURE OTHR SPECIMN AEROBIC: CPT

## 2019-01-01 PROCEDURE — 88112 CYTOPATH CELL ENHANCE TECH: CPT

## 2019-01-01 PROCEDURE — 87015 SPECIMEN INFECT AGNT CONCNTJ: CPT

## 2019-01-01 PROCEDURE — 71275 CT ANGIOGRAPHY CHEST: CPT

## 2019-01-01 PROCEDURE — 700105 HCHG RX REV CODE 258: Performed by: INTERNAL MEDICINE

## 2019-01-01 PROCEDURE — 85055 RETICULATED PLATELET ASSAY: CPT

## 2019-01-01 PROCEDURE — 36556 INSERT NON-TUNNEL CV CATH: CPT | Mod: RT | Performed by: INTERNAL MEDICINE

## 2019-01-01 PROCEDURE — 82962 GLUCOSE BLOOD TEST: CPT | Mod: 91

## 2019-01-01 PROCEDURE — 87102 FUNGUS ISOLATION CULTURE: CPT

## 2019-01-01 PROCEDURE — 31500 INSERT EMERGENCY AIRWAY: CPT | Performed by: INTERNAL MEDICINE

## 2019-01-01 PROCEDURE — A9270 NON-COVERED ITEM OR SERVICE: HCPCS | Performed by: HOSPITALIST

## 2019-01-01 PROCEDURE — 99291 CRITICAL CARE FIRST HOUR: CPT | Mod: 25 | Performed by: INTERNAL MEDICINE

## 2019-01-01 PROCEDURE — 700101 HCHG RX REV CODE 250

## 2019-01-01 PROCEDURE — 5A1D70Z PERFORMANCE OF URINARY FILTRATION, INTERMITTENT, LESS THAN 6 HOURS PER DAY: ICD-10-PCS | Performed by: INTERNAL MEDICINE

## 2019-01-01 PROCEDURE — 80053 COMPREHEN METABOLIC PANEL: CPT

## 2019-01-01 PROCEDURE — 83735 ASSAY OF MAGNESIUM: CPT

## 2019-01-01 PROCEDURE — C1752 CATH,HEMODIALYSIS,SHORT-TERM: HCPCS

## 2019-01-01 PROCEDURE — 306456 ABVISER MONITORING SYSTEM: Performed by: INTERNAL MEDICINE

## 2019-01-01 PROCEDURE — 92950 HEART/LUNG RESUSCITATION CPR: CPT

## 2019-01-01 PROCEDURE — 84478 ASSAY OF TRIGLYCERIDES: CPT

## 2019-01-01 PROCEDURE — 700105 HCHG RX REV CODE 258

## 2019-01-01 PROCEDURE — 306379 HCHG ARTERIAL LINE, 18G

## 2019-01-01 PROCEDURE — 36620 INSERTION CATHETER ARTERY: CPT

## 2019-01-01 PROCEDURE — 700111 HCHG RX REV CODE 636 W/ 250 OVERRIDE (IP): Performed by: INTERNAL MEDICINE

## 2019-01-01 PROCEDURE — 80074 ACUTE HEPATITIS PANEL: CPT

## 2019-01-01 PROCEDURE — 99213 OFFICE O/P EST LOW 20 MIN: CPT | Performed by: INTERNAL MEDICINE

## 2019-01-01 PROCEDURE — 700101 HCHG RX REV CODE 250: Performed by: INTERNAL MEDICINE

## 2019-01-01 PROCEDURE — 5A1945Z RESPIRATORY VENTILATION, 24-96 CONSECUTIVE HOURS: ICD-10-PCS | Performed by: INTERNAL MEDICINE

## 2019-01-01 PROCEDURE — 700111 HCHG RX REV CODE 636 W/ 250 OVERRIDE (IP): Performed by: HOSPITALIST

## 2019-01-01 PROCEDURE — 84132 ASSAY OF SERUM POTASSIUM: CPT

## 2019-01-01 PROCEDURE — 700102 HCHG RX REV CODE 250 W/ 637 OVERRIDE(OP): Performed by: INTERNAL MEDICINE

## 2019-01-01 PROCEDURE — 51702 INSERT TEMP BLADDER CATH: CPT

## 2019-01-01 PROCEDURE — 302136 NUTRITION PUMP: Performed by: INTERNAL MEDICINE

## 2019-01-01 PROCEDURE — 36556 INSERT NON-TUNNEL CV CATH: CPT

## 2019-01-01 PROCEDURE — 700102 HCHG RX REV CODE 250 W/ 637 OVERRIDE(OP): Performed by: HOSPITALIST

## 2019-01-01 PROCEDURE — 85027 COMPLETE CBC AUTOMATED: CPT

## 2019-01-01 PROCEDURE — 82330 ASSAY OF CALCIUM: CPT

## 2019-01-01 PROCEDURE — 02HP32Z INSERTION OF MONITORING DEVICE INTO PULMONARY TRUNK, PERCUTANEOUS APPROACH: ICD-10-PCS | Performed by: INTERNAL MEDICINE

## 2019-01-01 PROCEDURE — 306802 SYSTEM FECAL MANAGEMENT CATHETER KIT FLEXI - SEAL: Performed by: INTERNAL MEDICINE

## 2019-01-01 PROCEDURE — 94002 VENT MGMT INPAT INIT DAY: CPT

## 2019-01-01 PROCEDURE — 88305 TISSUE EXAM BY PATHOLOGIST: CPT

## 2019-01-01 PROCEDURE — 84100 ASSAY OF PHOSPHORUS: CPT

## 2019-01-01 PROCEDURE — 31645 BRNCHSC W/THER ASPIR 1ST: CPT | Performed by: INTERNAL MEDICINE

## 2019-01-01 PROCEDURE — 82803 BLOOD GASES ANY COMBINATION: CPT

## 2019-01-01 PROCEDURE — 303105 HCHG CATHETER EXTRA

## 2019-01-01 PROCEDURE — 94003 VENT MGMT INPAT SUBQ DAY: CPT

## 2019-01-01 PROCEDURE — 71045 X-RAY EXAM CHEST 1 VIEW: CPT

## 2019-01-01 PROCEDURE — 90945 DIALYSIS ONE EVALUATION: CPT | Performed by: INTERNAL MEDICINE

## 2019-01-01 PROCEDURE — 99291 CRITICAL CARE FIRST HOUR: CPT

## 2019-01-01 PROCEDURE — 87205 SMEAR GRAM STAIN: CPT | Mod: 91

## 2019-01-01 PROCEDURE — 76700 US EXAM ABDOM COMPLETE: CPT

## 2019-01-01 PROCEDURE — 82533 TOTAL CORTISOL: CPT

## 2019-01-01 PROCEDURE — 94640 AIRWAY INHALATION TREATMENT: CPT

## 2019-01-01 PROCEDURE — 93005 ELECTROCARDIOGRAM TRACING: CPT | Performed by: EMERGENCY MEDICINE

## 2019-01-01 PROCEDURE — P9047 ALBUMIN (HUMAN), 25%, 50ML: HCPCS | Performed by: INTERNAL MEDICINE

## 2019-01-01 PROCEDURE — 700111 HCHG RX REV CODE 636 W/ 250 OVERRIDE (IP)

## 2019-01-01 PROCEDURE — 83605 ASSAY OF LACTIC ACID: CPT

## 2019-01-01 PROCEDURE — 03HY32Z INSERTION OF MONITORING DEVICE INTO UPPER ARTERY, PERCUTANEOUS APPROACH: ICD-10-PCS | Performed by: INTERNAL MEDICINE

## 2019-01-01 PROCEDURE — 94760 N-INVAS EAR/PLS OXIMETRY 1: CPT

## 2019-01-01 PROCEDURE — 87206 SMEAR FLUORESCENT/ACID STAI: CPT

## 2019-01-01 PROCEDURE — 93308 TTE F-UP OR LMTD: CPT | Mod: 26 | Performed by: INTERNAL MEDICINE

## 2019-01-01 PROCEDURE — 93325 DOPPLER ECHO COLOR FLOW MAPG: CPT | Mod: 26 | Performed by: INTERNAL MEDICINE

## 2019-01-01 PROCEDURE — C1751 CATH, INF, PER/CENT/MIDLINE: HCPCS

## 2019-01-01 PROCEDURE — 0B9F8ZX DRAINAGE OF RIGHT LOWER LUNG LOBE, VIA NATURAL OR ARTIFICIAL OPENING ENDOSCOPIC, DIAGNOSTIC: ICD-10-PCS | Performed by: INTERNAL MEDICINE

## 2019-01-01 PROCEDURE — 74019 RADEX ABDOMEN 2 VIEWS: CPT

## 2019-01-01 PROCEDURE — 96374 THER/PROPH/DIAG INJ IV PUSH: CPT

## 2019-01-01 PROCEDURE — 82803 BLOOD GASES ANY COMBINATION: CPT | Mod: 91

## 2019-01-01 PROCEDURE — 85025 COMPLETE CBC W/AUTO DIFF WBC: CPT

## 2019-01-01 PROCEDURE — 99255 IP/OBS CONSLTJ NEW/EST HI 80: CPT | Performed by: INTERNAL MEDICINE

## 2019-01-01 PROCEDURE — 36600 WITHDRAWAL OF ARTERIAL BLOOD: CPT

## 2019-01-01 PROCEDURE — 84145 PROCALCITONIN (PCT): CPT

## 2019-01-01 PROCEDURE — 84134 ASSAY OF PREALBUMIN: CPT

## 2019-01-01 PROCEDURE — 86140 C-REACTIVE PROTEIN: CPT

## 2019-01-01 PROCEDURE — 0BH17EZ INSERTION OF ENDOTRACHEAL AIRWAY INTO TRACHEA, VIA NATURAL OR ARTIFICIAL OPENING: ICD-10-PCS | Performed by: INTERNAL MEDICINE

## 2019-01-01 PROCEDURE — 302978 HCHG BRONCHOSCOPY-DIAGNOSTIC

## 2019-01-01 PROCEDURE — 87502 INFLUENZA DNA AMP PROBE: CPT

## 2019-01-01 PROCEDURE — 83880 ASSAY OF NATRIURETIC PEPTIDE: CPT

## 2019-01-01 PROCEDURE — 82140 ASSAY OF AMMONIA: CPT

## 2019-01-01 PROCEDURE — 84484 ASSAY OF TROPONIN QUANT: CPT

## 2019-01-01 PROCEDURE — 700117 HCHG RX CONTRAST REV CODE 255: Performed by: EMERGENCY MEDICINE

## 2019-01-01 PROCEDURE — 93325 DOPPLER ECHO COLOR FLOW MAPG: CPT

## 2019-01-01 PROCEDURE — 99223 1ST HOSP IP/OBS HIGH 75: CPT | Performed by: HOSPITALIST

## 2019-01-01 PROCEDURE — 4A133B3 MONITORING OF ARTERIAL PRESSURE, PULMONARY, PERCUTANEOUS APPROACH: ICD-10-PCS | Performed by: INTERNAL MEDICINE

## 2019-01-01 PROCEDURE — 74177 CT ABD & PELVIS W/CONTRAST: CPT

## 2019-01-01 PROCEDURE — 31500 INSERT EMERGENCY AIRWAY: CPT

## 2019-01-01 PROCEDURE — 90945 DIALYSIS ONE EVALUATION: CPT

## 2019-01-01 PROCEDURE — 93321 DOPPLER ECHO F-UP/LMTD STD: CPT | Mod: 26 | Performed by: INTERNAL MEDICINE

## 2019-01-01 PROCEDURE — 89051 BODY FLUID CELL COUNT: CPT

## 2019-01-01 PROCEDURE — 87641 MR-STAPH DNA AMP PROBE: CPT

## 2019-01-01 PROCEDURE — 81001 URINALYSIS AUTO W/SCOPE: CPT

## 2019-01-01 PROCEDURE — 85610 PROTHROMBIN TIME: CPT

## 2019-01-01 PROCEDURE — 85014 HEMATOCRIT: CPT

## 2019-01-01 PROCEDURE — 93503 INSERT/PLACE HEART CATHETER: CPT | Mod: LT | Performed by: INTERNAL MEDICINE

## 2019-01-01 PROCEDURE — 87205 SMEAR GRAM STAIN: CPT

## 2019-01-01 PROCEDURE — B548ZZA ULTRASONOGRAPHY OF SUPERIOR VENA CAVA, GUIDANCE: ICD-10-PCS | Performed by: INTERNAL MEDICINE

## 2019-01-01 PROCEDURE — 700117 HCHG RX CONTRAST REV CODE 255: Performed by: STUDENT IN AN ORGANIZED HEALTH CARE EDUCATION/TRAINING PROGRAM

## 2019-01-01 PROCEDURE — 84295 ASSAY OF SERUM SODIUM: CPT

## 2019-01-01 PROCEDURE — 0B9J8ZX DRAINAGE OF LEFT LOWER LUNG LOBE, VIA NATURAL OR ARTIFICIAL OPENING ENDOSCOPIC, DIAGNOSTIC: ICD-10-PCS | Performed by: INTERNAL MEDICINE

## 2019-01-01 PROCEDURE — 99204 OFFICE O/P NEW MOD 45 MIN: CPT | Performed by: INTERNAL MEDICINE

## 2019-01-01 PROCEDURE — 86706 HEP B SURFACE ANTIBODY: CPT

## 2019-01-01 PROCEDURE — 8907 PR URINE COLLECT ONLY: Performed by: PHYSICIAN ASSISTANT

## 2019-01-01 PROCEDURE — 93503 INSERT/PLACE HEART CATHETER: CPT

## 2019-01-01 PROCEDURE — 36556 INSERT NON-TUNNEL CV CATH: CPT | Mod: 51,RT | Performed by: INTERNAL MEDICINE

## 2019-01-01 RX ORDER — FUROSEMIDE 10 MG/ML
80 INJECTION INTRAMUSCULAR; INTRAVENOUS ONCE
Status: COMPLETED | OUTPATIENT
Start: 2019-01-01 | End: 2019-01-01

## 2019-01-01 RX ORDER — PROMETHAZINE HYDROCHLORIDE 25 MG/1
12.5-25 TABLET ORAL EVERY 4 HOURS PRN
Status: DISCONTINUED | OUTPATIENT
Start: 2019-01-01 | End: 2019-01-01

## 2019-01-01 RX ORDER — LORAZEPAM 2 MG/ML
1 INJECTION INTRAMUSCULAR
Status: DISCONTINUED | OUTPATIENT
Start: 2019-01-01 | End: 2019-01-01

## 2019-01-01 RX ORDER — ETOMIDATE 2 MG/ML
20 INJECTION INTRAVENOUS ONCE
Status: COMPLETED | OUTPATIENT
Start: 2019-01-01 | End: 2019-01-01

## 2019-01-01 RX ORDER — LORAZEPAM 2 MG/ML
3 INJECTION INTRAMUSCULAR
Status: DISCONTINUED | OUTPATIENT
Start: 2019-01-01 | End: 2019-01-01

## 2019-01-01 RX ORDER — AMOXICILLIN 250 MG
2 CAPSULE ORAL 2 TIMES DAILY
Status: DISCONTINUED | OUTPATIENT
Start: 2019-01-01 | End: 2019-01-01

## 2019-01-01 RX ORDER — SODIUM POLYSTYRENE SULFONATE 15 G/60ML
30 SUSPENSION ORAL; RECTAL ONCE
Status: DISCONTINUED | OUTPATIENT
Start: 2019-01-01 | End: 2019-01-01

## 2019-01-01 RX ORDER — FAMOTIDINE 20 MG/1
20 TABLET, FILM COATED ORAL EVERY 12 HOURS
Status: DISCONTINUED | OUTPATIENT
Start: 2019-01-01 | End: 2019-01-01

## 2019-01-01 RX ORDER — LACTULOSE 20 G/30ML
30 SOLUTION ORAL EVERY 8 HOURS
Status: DISCONTINUED | OUTPATIENT
Start: 2019-01-01 | End: 2019-01-01

## 2019-01-01 RX ORDER — SODIUM CHLORIDE 9 MG/ML
INJECTION, SOLUTION INTRAVENOUS
Status: COMPLETED
Start: 2019-01-01 | End: 2019-01-01

## 2019-01-01 RX ORDER — PROCHLORPERAZINE EDISYLATE 5 MG/ML
5-10 INJECTION INTRAMUSCULAR; INTRAVENOUS EVERY 4 HOURS PRN
Status: DISCONTINUED | OUTPATIENT
Start: 2019-01-01 | End: 2019-01-01

## 2019-01-01 RX ORDER — NITROGLYCERIN 20 MG/100ML
0-200 INJECTION INTRAVENOUS CONTINUOUS
Status: DISCONTINUED | OUTPATIENT
Start: 2019-01-01 | End: 2019-01-01

## 2019-01-01 RX ORDER — FOLIC ACID 1 MG/1
1 TABLET ORAL DAILY
Status: DISPENSED | OUTPATIENT
Start: 2019-01-01 | End: 2019-01-01

## 2019-01-01 RX ORDER — CLONIDINE HYDROCHLORIDE 0.1 MG/1
0.1 TABLET ORAL
Status: DISCONTINUED | OUTPATIENT
Start: 2019-01-01 | End: 2019-01-01

## 2019-01-01 RX ORDER — DEXMEDETOMIDINE HYDROCHLORIDE 4 UG/ML
0-1.5 INJECTION, SOLUTION INTRAVENOUS CONTINUOUS
Status: DISCONTINUED | OUTPATIENT
Start: 2019-01-01 | End: 2019-01-01

## 2019-01-01 RX ORDER — LISINOPRIL 10 MG/1
TABLET ORAL
Refills: 3 | COMMUNITY
Start: 2019-01-01 | End: 2019-01-01

## 2019-01-01 RX ORDER — IPRATROPIUM BROMIDE AND ALBUTEROL SULFATE 2.5; .5 MG/3ML; MG/3ML
3 SOLUTION RESPIRATORY (INHALATION)
Status: DISCONTINUED | OUTPATIENT
Start: 2019-01-01 | End: 2019-01-01

## 2019-01-01 RX ORDER — PHENYLEPHRINE HCL IN 0.9% NACL 0.5 MG/5ML
SYRINGE (ML) INTRAVENOUS
Status: COMPLETED
Start: 2019-01-01 | End: 2019-01-01

## 2019-01-01 RX ORDER — ONDANSETRON 4 MG/1
4 TABLET, ORALLY DISINTEGRATING ORAL EVERY 4 HOURS PRN
Status: DISCONTINUED | OUTPATIENT
Start: 2019-01-01 | End: 2019-01-01

## 2019-01-01 RX ORDER — LACTULOSE 10 G/15ML
300 SOLUTION ORAL ONCE
Status: COMPLETED | OUTPATIENT
Start: 2019-01-01 | End: 2019-01-01

## 2019-01-01 RX ORDER — PHENYLEPHRINE HCL IN 0.9% NACL 0.5 MG/5ML
100 SYRINGE (ML) INTRAVENOUS
Status: ACTIVE | OUTPATIENT
Start: 2019-01-01 | End: 2019-01-01

## 2019-01-01 RX ORDER — LORAZEPAM 2 MG/ML
1-10 INJECTION INTRAMUSCULAR
Status: DISCONTINUED | OUTPATIENT
Start: 2019-01-01 | End: 2019-01-01 | Stop reason: HOSPADM

## 2019-01-01 RX ORDER — HEPARIN SODIUM 5000 [USP'U]/ML
5000 INJECTION, SOLUTION INTRAVENOUS; SUBCUTANEOUS EVERY 8 HOURS
Status: DISCONTINUED | OUTPATIENT
Start: 2019-01-01 | End: 2019-01-01

## 2019-01-01 RX ORDER — POLYETHYLENE GLYCOL 3350 17 G/17G
1 POWDER, FOR SOLUTION ORAL
Status: DISCONTINUED | OUTPATIENT
Start: 2019-01-01 | End: 2019-01-01

## 2019-01-01 RX ORDER — FUROSEMIDE 10 MG/ML
40 INJECTION INTRAMUSCULAR; INTRAVENOUS
Status: DISCONTINUED | OUTPATIENT
Start: 2019-01-01 | End: 2019-01-01

## 2019-01-01 RX ORDER — SODIUM CHLORIDE 9 MG/ML
2000 INJECTION, SOLUTION INTRAVENOUS ONCE
Status: DISCONTINUED | OUTPATIENT
Start: 2019-01-01 | End: 2019-01-01

## 2019-01-01 RX ORDER — GUAIFENESIN/DEXTROMETHORPHAN 100-10MG/5
10 SYRUP ORAL EVERY 6 HOURS PRN
Status: DISCONTINUED | OUTPATIENT
Start: 2019-01-01 | End: 2019-01-01

## 2019-01-01 RX ORDER — ROCURONIUM BROMIDE 10 MG/ML
100 INJECTION, SOLUTION INTRAVENOUS ONCE
Status: COMPLETED | OUTPATIENT
Start: 2019-01-01 | End: 2019-01-01

## 2019-01-01 RX ORDER — LACTULOSE 20 G/30ML
30 SOLUTION ORAL EVERY EVENING
Status: DISCONTINUED | OUTPATIENT
Start: 2019-01-01 | End: 2019-01-01

## 2019-01-01 RX ORDER — FAMOTIDINE 20 MG/1
20 TABLET, FILM COATED ORAL DAILY
Status: DISCONTINUED | OUTPATIENT
Start: 2019-01-01 | End: 2019-01-01

## 2019-01-01 RX ORDER — MAGNESIUM SULFATE HEPTAHYDRATE 40 MG/ML
4 INJECTION, SOLUTION INTRAVENOUS ONCE
Status: COMPLETED | OUTPATIENT
Start: 2019-01-01 | End: 2019-01-01

## 2019-01-01 RX ORDER — VECURONIUM BROMIDE 1 MG/ML
0.1 INJECTION, POWDER, LYOPHILIZED, FOR SOLUTION INTRAVENOUS ONCE
Status: COMPLETED | OUTPATIENT
Start: 2019-01-01 | End: 2019-01-01

## 2019-01-01 RX ORDER — LORAZEPAM 2 MG/ML
2 INJECTION INTRAMUSCULAR
Status: DISCONTINUED | OUTPATIENT
Start: 2019-01-01 | End: 2019-01-01

## 2019-01-01 RX ORDER — BISACODYL 10 MG
10 SUPPOSITORY, RECTAL RECTAL
Status: DISCONTINUED | OUTPATIENT
Start: 2019-01-01 | End: 2019-01-01

## 2019-01-01 RX ORDER — LABETALOL HYDROCHLORIDE 5 MG/ML
10 INJECTION, SOLUTION INTRAVENOUS EVERY 4 HOURS PRN
Status: DISCONTINUED | OUTPATIENT
Start: 2019-01-01 | End: 2019-01-01

## 2019-01-01 RX ORDER — VECURONIUM BROMIDE 1 MG/ML
0.1 INJECTION, POWDER, LYOPHILIZED, FOR SOLUTION INTRAVENOUS
Status: DISCONTINUED | OUTPATIENT
Start: 2019-01-01 | End: 2019-01-01

## 2019-01-01 RX ORDER — LINEZOLID 2 MG/ML
600 INJECTION, SOLUTION INTRAVENOUS EVERY 12 HOURS
Status: DISCONTINUED | OUTPATIENT
Start: 2019-01-01 | End: 2019-01-01

## 2019-01-01 RX ORDER — SODIUM CHLORIDE 9 MG/ML
30 INJECTION, SOLUTION INTRAVENOUS ONCE
Status: COMPLETED | OUTPATIENT
Start: 2019-01-01 | End: 2019-01-01

## 2019-01-01 RX ORDER — NOREPINEPHRINE BITARTRATE 1 MG/ML
INJECTION, SOLUTION INTRAVENOUS
Status: COMPLETED
Start: 2019-01-01 | End: 2019-01-01

## 2019-01-01 RX ORDER — SODIUM POLYSTYRENE SULFONATE 15 G/60ML
30 SUSPENSION ORAL; RECTAL ONCE
Status: COMPLETED | OUTPATIENT
Start: 2019-01-01 | End: 2019-01-01

## 2019-01-01 RX ORDER — MORPHINE SULFATE 4 MG/ML
1-10 INJECTION, SOLUTION INTRAMUSCULAR; INTRAVENOUS
Status: DISCONTINUED | OUTPATIENT
Start: 2019-01-01 | End: 2019-01-01 | Stop reason: HOSPADM

## 2019-01-01 RX ORDER — THIAMINE MONONITRATE (VIT B1) 100 MG
100 TABLET ORAL DAILY
Status: DISCONTINUED | OUTPATIENT
Start: 2019-01-01 | End: 2019-01-01

## 2019-01-01 RX ORDER — LISINOPRIL 20 MG/1
20 TABLET ORAL EVERY MORNING
Refills: 3 | COMMUNITY
Start: 2019-01-01

## 2019-01-01 RX ORDER — LORAZEPAM 2 MG/ML
4 INJECTION INTRAMUSCULAR
Status: DISCONTINUED | OUTPATIENT
Start: 2019-01-01 | End: 2019-01-01

## 2019-01-01 RX ORDER — ONDANSETRON 2 MG/ML
4 INJECTION INTRAMUSCULAR; INTRAVENOUS EVERY 4 HOURS PRN
Status: DISCONTINUED | OUTPATIENT
Start: 2019-01-01 | End: 2019-01-01

## 2019-01-01 RX ORDER — PROMETHAZINE HYDROCHLORIDE 25 MG/1
12.5-25 SUPPOSITORY RECTAL EVERY 4 HOURS PRN
Status: DISCONTINUED | OUTPATIENT
Start: 2019-01-01 | End: 2019-01-01

## 2019-01-01 RX ORDER — HEPARIN SODIUM 1000 [USP'U]/ML
3300 INJECTION, SOLUTION INTRAVENOUS; SUBCUTANEOUS ONCE
Status: COMPLETED | OUTPATIENT
Start: 2019-01-01 | End: 2019-01-01

## 2019-01-01 RX ORDER — PHENYLEPHRINE HYDROCHLORIDE 10 MG/ML
INJECTION, SOLUTION INTRAMUSCULAR; INTRAVENOUS; SUBCUTANEOUS
Status: COMPLETED
Start: 2019-01-01 | End: 2019-01-01

## 2019-01-01 RX ORDER — FOLIC ACID 1 MG/1
1 TABLET ORAL DAILY
Status: DISCONTINUED | OUTPATIENT
Start: 2019-01-01 | End: 2019-01-01

## 2019-01-01 RX ORDER — ALBUMIN (HUMAN) 12.5 G/50ML
25 SOLUTION INTRAVENOUS EVERY 6 HOURS
Status: COMPLETED | OUTPATIENT
Start: 2019-01-01 | End: 2019-01-01

## 2019-01-01 RX ORDER — SODIUM CHLORIDE 9 MG/ML
INJECTION, SOLUTION INTRAVENOUS CONTINUOUS
Status: DISCONTINUED | OUTPATIENT
Start: 2019-01-01 | End: 2019-01-01

## 2019-01-01 RX ORDER — LISINOPRIL 20 MG/1
20 TABLET ORAL EVERY MORNING
Status: DISCONTINUED | OUTPATIENT
Start: 2019-01-01 | End: 2019-01-01

## 2019-01-01 RX ADMIN — PHENYLEPHRINE HYDROCHLORIDE 300 MCG/MIN: 10 INJECTION INTRAVENOUS at 03:29

## 2019-01-01 RX ADMIN — IPRATROPIUM BROMIDE AND ALBUTEROL SULFATE 3 ML: .5; 3 SOLUTION RESPIRATORY (INHALATION) at 10:14

## 2019-01-01 RX ADMIN — VASOPRESSIN 0.03 UNITS/MIN: 20 INJECTION INTRAVENOUS at 13:44

## 2019-01-01 RX ADMIN — EPOPROSTENOL SODIUM 0.04 MCG/KG/MIN: 1.5 INJECTION, POWDER, LYOPHILIZED, FOR SOLUTION INTRAVENOUS at 12:00

## 2019-01-01 RX ADMIN — IOHEXOL 100 ML: 350 INJECTION, SOLUTION INTRAVENOUS at 16:11

## 2019-01-01 RX ADMIN — IPRATROPIUM BROMIDE AND ALBUTEROL SULFATE 3 ML: .5; 3 SOLUTION RESPIRATORY (INHALATION) at 22:40

## 2019-01-01 RX ADMIN — LORAZEPAM 1 MG: 2 INJECTION INTRAMUSCULAR; INTRAVENOUS at 21:30

## 2019-01-01 RX ADMIN — IPRATROPIUM BROMIDE AND ALBUTEROL SULFATE 3 ML: .5; 3 SOLUTION RESPIRATORY (INHALATION) at 09:53

## 2019-01-01 RX ADMIN — PROPOFOL 50 MCG/KG/MIN: 10 INJECTION, EMULSION INTRAVENOUS at 02:09

## 2019-01-01 RX ADMIN — Medication 100 MCG/HR: at 22:45

## 2019-01-01 RX ADMIN — DEXMEDETOMIDINE HYDROCHLORIDE 0.8 MCG/KG/HR: 100 INJECTION, SOLUTION INTRAVENOUS at 04:08

## 2019-01-01 RX ADMIN — FENTANYL CITRATE 100 MCG: 50 INJECTION INTRAMUSCULAR; INTRAVENOUS at 23:44

## 2019-01-01 RX ADMIN — SODIUM CHLORIDE: 9 INJECTION, SOLUTION INTRAVENOUS at 17:27

## 2019-01-01 RX ADMIN — EPOPROSTENOL SODIUM 0.04 MCG/KG/MIN: 1.5 INJECTION, POWDER, LYOPHILIZED, FOR SOLUTION INTRAVENOUS at 17:34

## 2019-01-01 RX ADMIN — NOREPINEPHRINE BITARTRATE 15 MCG/MIN: 1 INJECTION INTRAVENOUS at 07:44

## 2019-01-01 RX ADMIN — ENOXAPARIN SODIUM 40 MG: 100 INJECTION SUBCUTANEOUS at 19:55

## 2019-01-01 RX ADMIN — PIPERACILLIN AND TAZOBACTAM 4.5 G: 4; .5 INJECTION, POWDER, LYOPHILIZED, FOR SOLUTION INTRAVENOUS; PARENTERAL at 14:45

## 2019-01-01 RX ADMIN — NOREPINEPHRINE BITARTRATE 25 MCG/MIN: 1 INJECTION INTRAVENOUS at 09:42

## 2019-01-01 RX ADMIN — DOXYCYCLINE 100 MG: 100 INJECTION, POWDER, LYOPHILIZED, FOR SOLUTION INTRAVENOUS at 05:00

## 2019-01-01 RX ADMIN — PIPERACILLIN AND TAZOBACTAM 4.5 G: 4; .5 INJECTION, POWDER, LYOPHILIZED, FOR SOLUTION INTRAVENOUS; PARENTERAL at 05:16

## 2019-01-01 RX ADMIN — MINERAL OIL AND PETROLATUM 1 APPLICATION: 150; 830 OINTMENT OPHTHALMIC at 21:21

## 2019-01-01 RX ADMIN — IPRATROPIUM BROMIDE AND ALBUTEROL SULFATE 3 ML: .5; 3 SOLUTION RESPIRATORY (INHALATION) at 18:20

## 2019-01-01 RX ADMIN — SODIUM CHLORIDE 2397 ML: 9 INJECTION, SOLUTION INTRAVENOUS at 17:25

## 2019-01-01 RX ADMIN — HEPARIN SODIUM 5000 UNITS: 5000 INJECTION INTRAVENOUS; SUBCUTANEOUS at 21:20

## 2019-01-01 RX ADMIN — IPRATROPIUM BROMIDE AND ALBUTEROL SULFATE 3 ML: .5; 3 SOLUTION RESPIRATORY (INHALATION) at 09:02

## 2019-01-01 RX ADMIN — EPOPROSTENOL SODIUM 0.04 MCG/KG/MIN: 1.5 INJECTION, POWDER, LYOPHILIZED, FOR SOLUTION INTRAVENOUS at 10:01

## 2019-01-01 RX ADMIN — PIPERACILLIN AND TAZOBACTAM 4.5 G: 4; .5 INJECTION, POWDER, LYOPHILIZED, FOR SOLUTION INTRAVENOUS; PARENTERAL at 17:33

## 2019-01-01 RX ADMIN — DOXYCYCLINE 100 MG: 100 INJECTION, POWDER, LYOPHILIZED, FOR SOLUTION INTRAVENOUS at 05:08

## 2019-01-01 RX ADMIN — LACTULOSE 30 ML: 20 SOLUTION ORAL at 05:12

## 2019-01-01 RX ADMIN — LACTULOSE 300 ML: 10 SOLUTION ORAL at 12:30

## 2019-01-01 RX ADMIN — LINEZOLID 600 MG: 600 INJECTION, SOLUTION INTRAVENOUS at 05:00

## 2019-01-01 RX ADMIN — FUROSEMIDE 80 MG: 10 INJECTION, SOLUTION INTRAMUSCULAR; INTRAVENOUS at 14:20

## 2019-01-01 RX ADMIN — FOLIC ACID 1 MG: 1 TABLET ORAL at 05:34

## 2019-01-01 RX ADMIN — IPRATROPIUM BROMIDE AND ALBUTEROL SULFATE 3 ML: .5; 3 SOLUTION RESPIRATORY (INHALATION) at 01:50

## 2019-01-01 RX ADMIN — PROPOFOL 15 MCG/KG/MIN: 10 INJECTION, EMULSION INTRAVENOUS at 06:37

## 2019-01-01 RX ADMIN — FENTANYL CITRATE 100 MCG: 50 INJECTION INTRAMUSCULAR; INTRAVENOUS at 15:38

## 2019-01-01 RX ADMIN — IPRATROPIUM BROMIDE AND ALBUTEROL SULFATE 3 ML: .5; 3 SOLUTION RESPIRATORY (INHALATION) at 13:46

## 2019-01-01 RX ADMIN — LACTULOSE 30 ML: 20 SOLUTION ORAL at 17:33

## 2019-01-01 RX ADMIN — MINERAL OIL AND PETROLATUM 1 APPLICATION: 150; 830 OINTMENT OPHTHALMIC at 05:11

## 2019-01-01 RX ADMIN — EPOPROSTENOL SODIUM 0.04 MCG/KG/MIN: 1.5 INJECTION, POWDER, LYOPHILIZED, FOR SOLUTION INTRAVENOUS at 17:39

## 2019-01-01 RX ADMIN — LINEZOLID 600 MG: 600 INJECTION, SOLUTION INTRAVENOUS at 17:35

## 2019-01-01 RX ADMIN — FENTANYL CITRATE 100 MCG: 50 INJECTION INTRAMUSCULAR; INTRAVENOUS at 00:40

## 2019-01-01 RX ADMIN — THIAMINE HYDROCHLORIDE 500 MG: 100 INJECTION, SOLUTION INTRAMUSCULAR; INTRAVENOUS at 10:01

## 2019-01-01 RX ADMIN — ALBUMIN (HUMAN) 25 G: 5 SOLUTION INTRAVENOUS at 17:36

## 2019-01-01 RX ADMIN — EPOPROSTENOL SODIUM 0.04 MCG/KG/MIN: 1.5 INJECTION, POWDER, LYOPHILIZED, FOR SOLUTION INTRAVENOUS at 13:34

## 2019-01-01 RX ADMIN — PIPERACILLIN AND TAZOBACTAM 4.5 G: 4; .5 INJECTION, POWDER, LYOPHILIZED, FOR SOLUTION INTRAVENOUS; PARENTERAL at 21:41

## 2019-01-01 RX ADMIN — LINEZOLID 600 MG: 600 INJECTION, SOLUTION INTRAVENOUS at 18:14

## 2019-01-01 RX ADMIN — ALBUMIN (HUMAN) 25 G: 5 SOLUTION INTRAVENOUS at 23:45

## 2019-01-01 RX ADMIN — VASOPRESSIN 0.03 UNITS/MIN: 20 INJECTION INTRAVENOUS at 14:11

## 2019-01-01 RX ADMIN — NOREPINEPHRINE BITARTRATE 30 MCG/MIN: 1 INJECTION INTRAVENOUS at 14:47

## 2019-01-01 RX ADMIN — ONDANSETRON 4 MG: 2 INJECTION INTRAMUSCULAR; INTRAVENOUS at 20:20

## 2019-01-01 RX ADMIN — PHENYLEPHRINE HYDROCHLORIDE 200 MCG: 10 INJECTION INTRAVENOUS at 00:30

## 2019-01-01 RX ADMIN — IPRATROPIUM BROMIDE AND ALBUTEROL SULFATE 3 ML: .5; 3 SOLUTION RESPIRATORY (INHALATION) at 14:05

## 2019-01-01 RX ADMIN — INSULIN HUMAN 2 UNITS: 100 INJECTION, SOLUTION PARENTERAL at 18:40

## 2019-01-01 RX ADMIN — EPOPROSTENOL SODIUM 0.04 MCG/KG/MIN: 1.5 INJECTION, POWDER, LYOPHILIZED, FOR SOLUTION INTRAVENOUS at 06:10

## 2019-01-01 RX ADMIN — PIPERACILLIN AND TAZOBACTAM 4.5 G: 4; .5 INJECTION, POWDER, LYOPHILIZED, FOR SOLUTION INTRAVENOUS; PARENTERAL at 06:00

## 2019-01-01 RX ADMIN — HEPARIN SODIUM 5000 UNITS: 5000 INJECTION INTRAVENOUS; SUBCUTANEOUS at 14:45

## 2019-01-01 RX ADMIN — FUROSEMIDE 80 MG: 10 INJECTION, SOLUTION INTRAMUSCULAR; INTRAVENOUS at 21:30

## 2019-01-01 RX ADMIN — MORPHINE SULFATE 8 MG: 4 INJECTION INTRAVENOUS at 19:48

## 2019-01-01 RX ADMIN — MINERAL OIL AND PETROLATUM 1 APPLICATION: 150; 830 OINTMENT OPHTHALMIC at 14:11

## 2019-01-01 RX ADMIN — FOLIC ACID 1 MG: 1 TABLET ORAL at 19:56

## 2019-01-01 RX ADMIN — MINERAL OIL AND PETROLATUM 1 APPLICATION: 150; 830 OINTMENT OPHTHALMIC at 14:03

## 2019-01-01 RX ADMIN — IPRATROPIUM BROMIDE AND ALBUTEROL SULFATE 3 ML: .5; 3 SOLUTION RESPIRATORY (INHALATION) at 18:26

## 2019-01-01 RX ADMIN — PHENYLEPHRINE HYDROCHLORIDE 200 MCG: 10 INJECTION INTRAVENOUS at 00:10

## 2019-01-01 RX ADMIN — ALBUMIN (HUMAN) 25 G: 5 SOLUTION INTRAVENOUS at 17:33

## 2019-01-01 RX ADMIN — VECURONIUM BROMIDE 1 MCG/KG/MIN: 1 INJECTION, POWDER, LYOPHILIZED, FOR SOLUTION INTRAVENOUS at 16:39

## 2019-01-01 RX ADMIN — Medication 100 MG: at 05:34

## 2019-01-01 RX ADMIN — FENTANYL CITRATE 100 MCG: 50 INJECTION INTRAMUSCULAR; INTRAVENOUS at 21:35

## 2019-01-01 RX ADMIN — VASOPRESSIN 0.03 UNITS/MIN: 20 INJECTION INTRAVENOUS at 02:21

## 2019-01-01 RX ADMIN — VECURONIUM BROMIDE 14.32 MG: 10 INJECTION, POWDER, LYOPHILIZED, FOR SOLUTION INTRAVENOUS at 18:10

## 2019-01-01 RX ADMIN — FAMOTIDINE 20 MG: 10 INJECTION INTRAVENOUS at 05:19

## 2019-01-01 RX ADMIN — PROPOFOL 30 MCG/KG/MIN: 10 INJECTION, EMULSION INTRAVENOUS at 11:22

## 2019-01-01 RX ADMIN — FOLIC ACID 1 MG: 1 TABLET ORAL at 05:12

## 2019-01-01 RX ADMIN — PHENYLEPHRINE HYDROCHLORIDE 50 MCG/MIN: 10 INJECTION INTRAVENOUS at 12:13

## 2019-01-01 RX ADMIN — Medication 100 MG: at 19:56

## 2019-01-01 RX ADMIN — VECURONIUM BROMIDE 1 MCG/KG/MIN: 1 INJECTION, POWDER, LYOPHILIZED, FOR SOLUTION INTRAVENOUS at 06:37

## 2019-01-01 RX ADMIN — MAGNESIUM SULFATE IN WATER 4 G: 40 INJECTION, SOLUTION INTRAVENOUS at 09:28

## 2019-01-01 RX ADMIN — EPOPROSTENOL SODIUM 0.04 MCG/KG/MIN: 1.5 INJECTION, POWDER, LYOPHILIZED, FOR SOLUTION INTRAVENOUS at 14:05

## 2019-01-01 RX ADMIN — HEPARIN SODIUM 3300 UNITS: 1000 INJECTION, SOLUTION INTRAVENOUS; SUBCUTANEOUS at 12:15

## 2019-01-01 RX ADMIN — SODIUM CHLORIDE 1000 ML: 9 INJECTION, SOLUTION INTRAVENOUS at 15:18

## 2019-01-01 RX ADMIN — PROPOFOL 20 MCG/KG/MIN: 10 INJECTION, EMULSION INTRAVENOUS at 10:23

## 2019-01-01 RX ADMIN — PIPERACILLIN AND TAZOBACTAM 4.5 G: 4; .5 INJECTION, POWDER, LYOPHILIZED, FOR SOLUTION INTRAVENOUS; PARENTERAL at 12:58

## 2019-01-01 RX ADMIN — PROPOFOL 50 MCG/KG/MIN: 10 INJECTION, EMULSION INTRAVENOUS at 15:35

## 2019-01-01 RX ADMIN — LACTULOSE 30 ML: 20 SOLUTION ORAL at 21:19

## 2019-01-01 RX ADMIN — PROPOFOL 25 MCG/KG/MIN: 10 INJECTION, EMULSION INTRAVENOUS at 23:33

## 2019-01-01 RX ADMIN — ETOMIDATE 20 MG: 2 INJECTION, SOLUTION INTRAVENOUS at 00:48

## 2019-01-01 RX ADMIN — SODIUM CHLORIDE 250 ML: 9 INJECTION, SOLUTION INTRAVENOUS at 00:20

## 2019-01-01 RX ADMIN — VASOPRESSIN 0.03 UNITS/MIN: 20 INJECTION INTRAVENOUS at 02:00

## 2019-01-01 RX ADMIN — FAMOTIDINE 20 MG: 10 INJECTION INTRAVENOUS at 17:36

## 2019-01-01 RX ADMIN — PHENYLEPHRINE HYDROCHLORIDE 270 MCG/MIN: 10 INJECTION INTRAVENOUS at 14:47

## 2019-01-01 RX ADMIN — PROPOFOL 30 MCG/KG/MIN: 10 INJECTION, EMULSION INTRAVENOUS at 20:12

## 2019-01-01 RX ADMIN — INSULIN HUMAN 2 UNITS: 100 INJECTION, SOLUTION PARENTERAL at 05:42

## 2019-01-01 RX ADMIN — HEPARIN SODIUM 5000 UNITS: 5000 INJECTION INTRAVENOUS; SUBCUTANEOUS at 05:12

## 2019-01-01 RX ADMIN — IPRATROPIUM BROMIDE AND ALBUTEROL SULFATE 3 ML: .5; 3 SOLUTION RESPIRATORY (INHALATION) at 06:50

## 2019-01-01 RX ADMIN — ENOXAPARIN SODIUM 40 MG: 100 INJECTION SUBCUTANEOUS at 05:32

## 2019-01-01 RX ADMIN — HEPARIN SODIUM 5000 UNITS: 5000 INJECTION INTRAVENOUS; SUBCUTANEOUS at 22:00

## 2019-01-01 RX ADMIN — FENTANYL CITRATE 100 MCG: 50 INJECTION INTRAMUSCULAR; INTRAVENOUS at 03:12

## 2019-01-01 RX ADMIN — INSULIN HUMAN 2 UNITS: 100 INJECTION, SOLUTION PARENTERAL at 12:05

## 2019-01-01 RX ADMIN — MILRINONE LACTATE 0.38 MCG/KG/MIN: 1 INJECTION, SOLUTION INTRAVENOUS at 12:28

## 2019-01-01 RX ADMIN — VECURONIUM BROMIDE 1 MCG/KG/MIN: 1 INJECTION, POWDER, LYOPHILIZED, FOR SOLUTION INTRAVENOUS at 00:26

## 2019-01-01 RX ADMIN — SODIUM CHLORIDE 500 ML: 9 INJECTION, SOLUTION INTRAVENOUS at 17:45

## 2019-01-01 RX ADMIN — NOREPINEPHRINE BITARTRATE 30 MCG/MIN: 1 INJECTION INTRAVENOUS at 17:04

## 2019-01-01 RX ADMIN — PROPOFOL 30 MCG/KG/MIN: 10 INJECTION, EMULSION INTRAVENOUS at 14:56

## 2019-01-01 RX ADMIN — Medication 50 MCG/HR: at 03:46

## 2019-01-01 RX ADMIN — MINERAL OIL AND PETROLATUM 1 APPLICATION: 150; 830 OINTMENT OPHTHALMIC at 05:13

## 2019-01-01 RX ADMIN — PHENYLEPHRINE HYDROCHLORIDE 270 MCG/MIN: 10 INJECTION INTRAVENOUS at 13:46

## 2019-01-01 RX ADMIN — IPRATROPIUM BROMIDE AND ALBUTEROL SULFATE 3 ML: .5; 3 SOLUTION RESPIRATORY (INHALATION) at 14:13

## 2019-01-01 RX ADMIN — FENTANYL CITRATE 100 MCG: 50 INJECTION INTRAMUSCULAR; INTRAVENOUS at 08:15

## 2019-01-01 RX ADMIN — PROPOFOL 20 MCG/KG/MIN: 10 INJECTION, EMULSION INTRAVENOUS at 16:51

## 2019-01-01 RX ADMIN — IPRATROPIUM BROMIDE AND ALBUTEROL SULFATE 3 ML: .5; 3 SOLUTION RESPIRATORY (INHALATION) at 14:51

## 2019-01-01 RX ADMIN — DOXYCYCLINE 100 MG: 100 INJECTION, POWDER, LYOPHILIZED, FOR SOLUTION INTRAVENOUS at 05:11

## 2019-01-01 RX ADMIN — IPRATROPIUM BROMIDE AND ALBUTEROL SULFATE 3 ML: .5; 3 SOLUTION RESPIRATORY (INHALATION) at 21:32

## 2019-01-01 RX ADMIN — INSULIN HUMAN 2 UNITS: 100 INJECTION, SOLUTION PARENTERAL at 00:04

## 2019-01-01 RX ADMIN — HEPARIN SODIUM 5000 UNITS: 5000 INJECTION INTRAVENOUS; SUBCUTANEOUS at 14:07

## 2019-01-01 RX ADMIN — VASOPRESSIN 0.03 UNITS/MIN: 20 INJECTION INTRAVENOUS at 15:37

## 2019-01-01 RX ADMIN — SODIUM CHLORIDE: 9 INJECTION, SOLUTION INTRAVENOUS at 15:30

## 2019-01-01 RX ADMIN — INSULIN HUMAN 2 UNITS: 100 INJECTION, SOLUTION PARENTERAL at 05:24

## 2019-01-01 RX ADMIN — VASOPRESSIN 0.03 UNITS/MIN: 20 INJECTION INTRAVENOUS at 02:09

## 2019-01-01 RX ADMIN — PIPERACILLIN AND TAZOBACTAM 4.5 G: 4; .5 INJECTION, POWDER, LYOPHILIZED, FOR SOLUTION INTRAVENOUS; PARENTERAL at 21:20

## 2019-01-01 RX ADMIN — PROPOFOL 45 MCG/KG/MIN: 10 INJECTION, EMULSION INTRAVENOUS at 21:14

## 2019-01-01 RX ADMIN — FAMOTIDINE 20 MG: 10 INJECTION INTRAVENOUS at 05:09

## 2019-01-01 RX ADMIN — PROPOFOL 20 MCG/KG/MIN: 10 INJECTION, EMULSION INTRAVENOUS at 04:38

## 2019-01-01 RX ADMIN — THERA TABS 1 TABLET: TAB at 05:34

## 2019-01-01 RX ADMIN — FUROSEMIDE 40 MG: 10 INJECTION, SOLUTION INTRAMUSCULAR; INTRAVENOUS at 05:34

## 2019-01-01 RX ADMIN — LORAZEPAM 2 MG: 2 INJECTION INTRAMUSCULAR; INTRAVENOUS at 19:57

## 2019-01-01 RX ADMIN — Medication 100 MCG/HR: at 11:25

## 2019-01-01 RX ADMIN — FAMOTIDINE 20 MG: 20 TABLET ORAL at 05:12

## 2019-01-01 RX ADMIN — PROPOFOL 40 MCG/KG/MIN: 10 INJECTION, EMULSION INTRAVENOUS at 18:14

## 2019-01-01 RX ADMIN — DOXYCYCLINE 100 MG: 100 INJECTION, POWDER, LYOPHILIZED, FOR SOLUTION INTRAVENOUS at 20:12

## 2019-01-01 RX ADMIN — THIAMINE HYDROCHLORIDE 500 MG: 100 INJECTION, SOLUTION INTRAMUSCULAR; INTRAVENOUS at 05:01

## 2019-01-01 RX ADMIN — THIAMINE HYDROCHLORIDE 500 MG: 100 INJECTION, SOLUTION INTRAMUSCULAR; INTRAVENOUS at 06:11

## 2019-01-01 RX ADMIN — IOHEXOL 100 ML: 350 INJECTION, SOLUTION INTRAVENOUS at 16:13

## 2019-01-01 RX ADMIN — IPRATROPIUM BROMIDE AND ALBUTEROL SULFATE 3 ML: .5; 3 SOLUTION RESPIRATORY (INHALATION) at 22:11

## 2019-01-01 RX ADMIN — PIPERACILLIN AND TAZOBACTAM 4.5 G: 4; .5 INJECTION, POWDER, LYOPHILIZED, FOR SOLUTION INTRAVENOUS; PARENTERAL at 12:48

## 2019-01-01 RX ADMIN — THERA TABS 1 TABLET: TAB at 05:12

## 2019-01-01 RX ADMIN — EPOPROSTENOL SODIUM 0.04 MCG/KG/MIN: 1.5 INJECTION, POWDER, LYOPHILIZED, FOR SOLUTION INTRAVENOUS at 21:50

## 2019-01-01 RX ADMIN — ALBUMIN (HUMAN) 25 G: 5 SOLUTION INTRAVENOUS at 11:22

## 2019-01-01 RX ADMIN — PIPERACILLIN AND TAZOBACTAM 4.5 G: 4; .5 INJECTION, POWDER, LYOPHILIZED, FOR SOLUTION INTRAVENOUS; PARENTERAL at 04:37

## 2019-01-01 RX ADMIN — PHENYLEPHRINE HYDROCHLORIDE 300 MCG/MIN: 10 INJECTION INTRAVENOUS at 17:03

## 2019-01-01 RX ADMIN — ALBUMIN (HUMAN) 25 G: 5 SOLUTION INTRAVENOUS at 05:09

## 2019-01-01 RX ADMIN — EPOPROSTENOL SODIUM 0.04 MCG/KG/MIN: 1.5 INJECTION, POWDER, LYOPHILIZED, FOR SOLUTION INTRAVENOUS at 16:25

## 2019-01-01 RX ADMIN — PROPOFOL 50 MCG/KG/MIN: 10 INJECTION, EMULSION INTRAVENOUS at 23:42

## 2019-01-01 RX ADMIN — IPRATROPIUM BROMIDE AND ALBUTEROL SULFATE 3 ML: .5; 3 SOLUTION RESPIRATORY (INHALATION) at 02:20

## 2019-01-01 RX ADMIN — SODIUM CHLORIDE 250 ML: 9 INJECTION, SOLUTION INTRAVENOUS at 12:16

## 2019-01-01 RX ADMIN — NOREPINEPHRINE BITARTRATE 8 MG: 1 INJECTION INTRAVENOUS at 00:20

## 2019-01-01 RX ADMIN — FAMOTIDINE 20 MG: 10 INJECTION INTRAVENOUS at 17:33

## 2019-01-01 RX ADMIN — ALBUMIN (HUMAN) 25 G: 5 SOLUTION INTRAVENOUS at 11:45

## 2019-01-01 RX ADMIN — EPOPROSTENOL SODIUM 0.04 MCG/KG/MIN: 1.5 INJECTION, POWDER, LYOPHILIZED, FOR SOLUTION INTRAVENOUS at 04:52

## 2019-01-01 RX ADMIN — SENNOSIDES, DOCUSATE SODIUM 2 TABLET: 50; 8.6 TABLET, FILM COATED ORAL at 17:33

## 2019-01-01 RX ADMIN — PROPOFOL 55 MCG/KG/MIN: 10 INJECTION, EMULSION INTRAVENOUS at 04:13

## 2019-01-01 RX ADMIN — PHENYLEPHRINE HYDROCHLORIDE 200 MCG: 10 INJECTION INTRAVENOUS at 00:20

## 2019-01-01 RX ADMIN — VECURONIUM BROMIDE 1 MCG/KG/MIN: 1 INJECTION, POWDER, LYOPHILIZED, FOR SOLUTION INTRAVENOUS at 18:10

## 2019-01-01 RX ADMIN — METRONIDAZOLE 500 MG: 500 INJECTION, SOLUTION INTRAVENOUS at 02:00

## 2019-01-01 RX ADMIN — IPRATROPIUM BROMIDE AND ALBUTEROL SULFATE 3 ML: .5; 3 SOLUTION RESPIRATORY (INHALATION) at 19:06

## 2019-01-01 RX ADMIN — VECURONIUM BROMIDE 1 MCG/KG/MIN: 1 INJECTION, POWDER, LYOPHILIZED, FOR SOLUTION INTRAVENOUS at 01:19

## 2019-01-01 RX ADMIN — PROPOFOL 60 MCG/KG/MIN: 10 INJECTION, EMULSION INTRAVENOUS at 06:26

## 2019-01-01 RX ADMIN — EPOPROSTENOL SODIUM 0.04 MCG/KG/MIN: 1.5 INJECTION, POWDER, LYOPHILIZED, FOR SOLUTION INTRAVENOUS at 09:08

## 2019-01-01 RX ADMIN — THERA TABS 1 TABLET: TAB at 19:56

## 2019-01-01 RX ADMIN — METRONIDAZOLE 500 MG: 500 INJECTION, SOLUTION INTRAVENOUS at 14:33

## 2019-01-01 RX ADMIN — PHENYLEPHRINE HYDROCHLORIDE 300 MCG/MIN: 10 INJECTION INTRAVENOUS at 14:45

## 2019-01-01 RX ADMIN — EPOPROSTENOL SODIUM 0.04 MCG/KG/MIN: 1.5 INJECTION, POWDER, LYOPHILIZED, FOR SOLUTION INTRAVENOUS at 07:58

## 2019-01-01 RX ADMIN — PIPERACILLIN AND TAZOBACTAM 4.5 G: 4; .5 INJECTION, POWDER, LYOPHILIZED, FOR SOLUTION INTRAVENOUS; PARENTERAL at 20:57

## 2019-01-01 RX ADMIN — INSULIN HUMAN 2 UNITS: 100 INJECTION, SOLUTION PARENTERAL at 05:37

## 2019-01-01 RX ADMIN — PROPOFOL 20 MCG/KG/MIN: 10 INJECTION, EMULSION INTRAVENOUS at 08:28

## 2019-01-01 RX ADMIN — EPOPROSTENOL SODIUM 0.05 MCG/KG/MIN: 1.5 INJECTION, POWDER, LYOPHILIZED, FOR SOLUTION INTRAVENOUS at 04:00

## 2019-01-01 RX ADMIN — FAMOTIDINE 20 MG: 10 INJECTION INTRAVENOUS at 05:35

## 2019-01-01 RX ADMIN — SENNOSIDES, DOCUSATE SODIUM 2 TABLET: 50; 8.6 TABLET, FILM COATED ORAL at 19:56

## 2019-01-01 RX ADMIN — Medication 150 MCG/HR: at 19:50

## 2019-01-01 RX ADMIN — DOXYCYCLINE 100 MG: 100 INJECTION, POWDER, LYOPHILIZED, FOR SOLUTION INTRAVENOUS at 17:41

## 2019-01-01 RX ADMIN — DEXMEDETOMIDINE HYDROCHLORIDE 1 MCG/KG/HR: 100 INJECTION, SOLUTION INTRAVENOUS at 01:14

## 2019-01-01 RX ADMIN — METRONIDAZOLE 500 MG: 500 INJECTION, SOLUTION INTRAVENOUS at 05:29

## 2019-01-01 RX ADMIN — PROPOFOL 15 MCG/KG/MIN: 10 INJECTION, EMULSION INTRAVENOUS at 00:26

## 2019-01-01 RX ADMIN — CEFTRIAXONE SODIUM 2 G: 2 INJECTION, POWDER, FOR SOLUTION INTRAMUSCULAR; INTRAVENOUS at 01:18

## 2019-01-01 RX ADMIN — ROCURONIUM BROMIDE 100 MG: 10 INJECTION, SOLUTION INTRAVENOUS at 00:07

## 2019-01-01 RX ADMIN — IPRATROPIUM BROMIDE AND ALBUTEROL SULFATE 3 ML: .5; 3 SOLUTION RESPIRATORY (INHALATION) at 02:34

## 2019-01-01 RX ADMIN — IPRATROPIUM BROMIDE AND ALBUTEROL SULFATE 3 ML: .5; 3 SOLUTION RESPIRATORY (INHALATION) at 22:00

## 2019-01-01 RX ADMIN — EPOPROSTENOL SODIUM 0.04 MCG/KG/MIN: 1.5 INJECTION, POWDER, LYOPHILIZED, FOR SOLUTION INTRAVENOUS at 01:23

## 2019-01-01 RX ADMIN — IPRATROPIUM BROMIDE AND ALBUTEROL SULFATE 3 ML: .5; 3 SOLUTION RESPIRATORY (INHALATION) at 06:10

## 2019-01-01 RX ADMIN — EPOPROSTENOL SODIUM 0.04 MCG/KG/MIN: 1.5 INJECTION, POWDER, LYOPHILIZED, FOR SOLUTION INTRAVENOUS at 01:50

## 2019-01-01 RX ADMIN — INSULIN HUMAN 2 UNITS: 100 INJECTION, SOLUTION PARENTERAL at 00:53

## 2019-01-01 RX ADMIN — FENTANYL CITRATE 100 MCG: 50 INJECTION INTRAMUSCULAR; INTRAVENOUS at 02:57

## 2019-01-01 RX ADMIN — VASOPRESSIN 0.03 UNITS/MIN: 20 INJECTION INTRAVENOUS at 14:40

## 2019-01-01 RX ADMIN — LACTULOSE 30 ML: 20 SOLUTION ORAL at 14:45

## 2019-01-01 RX ADMIN — VECURONIUM BROMIDE 1 MCG/KG/MIN: 1 INJECTION, POWDER, LYOPHILIZED, FOR SOLUTION INTRAVENOUS at 08:52

## 2019-01-01 RX ADMIN — VASOPRESSIN 0.03 UNITS/MIN: 20 INJECTION INTRAVENOUS at 03:38

## 2019-01-01 RX ADMIN — FUROSEMIDE 5 MG/HR: 10 INJECTION, SOLUTION INTRAMUSCULAR; INTRAVENOUS at 09:25

## 2019-01-01 RX ADMIN — SODIUM POLYSTYRENE SULFONATE 30 G: 15 SUSPENSION ORAL; RECTAL at 10:14

## 2019-01-01 RX ADMIN — IPRATROPIUM BROMIDE AND ALBUTEROL SULFATE 3 ML: .5; 3 SOLUTION RESPIRATORY (INHALATION) at 06:28

## 2019-01-01 RX ADMIN — LINEZOLID 600 MG: 600 INJECTION, SOLUTION INTRAVENOUS at 05:11

## 2019-01-01 RX ADMIN — DOXYCYCLINE 100 MG: 100 INJECTION, POWDER, LYOPHILIZED, FOR SOLUTION INTRAVENOUS at 19:18

## 2019-01-01 RX ADMIN — NOREPINEPHRINE BITARTRATE 30 MCG/MIN: 1 INJECTION INTRAVENOUS at 03:30

## 2019-01-01 RX ADMIN — EPOPROSTENOL SODIUM 0.04 MCG/KG/MIN: 1.5 INJECTION, POWDER, LYOPHILIZED, FOR SOLUTION INTRAVENOUS at 21:31

## 2019-01-01 RX ADMIN — HEPARIN SODIUM 5000 UNITS: 5000 INJECTION INTRAVENOUS; SUBCUTANEOUS at 05:19

## 2019-01-01 RX ADMIN — IPRATROPIUM BROMIDE AND ALBUTEROL SULFATE 3 ML: .5; 3 SOLUTION RESPIRATORY (INHALATION) at 10:36

## 2019-01-01 RX ADMIN — HEPARIN SODIUM 5000 UNITS: 5000 INJECTION INTRAVENOUS; SUBCUTANEOUS at 06:00

## 2019-01-01 RX ADMIN — PROPOFOL 20 MCG/KG/MIN: 10 INJECTION, EMULSION INTRAVENOUS at 12:30

## 2019-01-01 RX ADMIN — NOREPINEPHRINE BITARTRATE 15 MCG/MIN: 1 INJECTION INTRAVENOUS at 00:28

## 2019-01-01 RX ADMIN — INSULIN HUMAN 2 UNITS: 100 INJECTION, SOLUTION PARENTERAL at 09:10

## 2019-01-01 RX ADMIN — LACTULOSE 30 ML: 20 SOLUTION ORAL at 10:56

## 2019-01-01 RX ADMIN — SENNOSIDES, DOCUSATE SODIUM 2 TABLET: 50; 8.6 TABLET, FILM COATED ORAL at 05:34

## 2019-01-01 ASSESSMENT — ENCOUNTER SYMPTOMS
DOUBLE VISION: 0
VOMITING: 0
DIARRHEA: 0
WEAKNESS: 0
NAUSEA: 0
SPEECH CHANGE: 0
FLANK PAIN: 0
STRIDOR: 0
DEPRESSION: 0
TREMORS: 0
SHORTNESS OF BREATH: 0
WHEEZING: 0
FOCAL WEAKNESS: 0
CHILLS: 1
WEAKNESS: 0
CLAUDICATION: 0
HEADACHES: 0
DIZZINESS: 0
CHILLS: 1
HEARTBURN: 0
COUGH: 0
FEVER: 0
SORE THROAT: 0
EYE DISCHARGE: 0
SPUTUM PRODUCTION: 0
DOUBLE VISION: 0
CLAUDICATION: 0
EYE REDNESS: 0
NECK PAIN: 0
VOMITING: 0
FEVER: 0
NECK PAIN: 0
FEVER: 0
NECK PAIN: 0
PND: 0
DOUBLE VISION: 0
NAUSEA: 0
EYE DISCHARGE: 0
PALPITATIONS: 0
SPUTUM PRODUCTION: 1
EYE PAIN: 0
STRIDOR: 0
HEARTBURN: 0
BACK PAIN: 0
COUGH: 0
DIZZINESS: 0
DEPRESSION: 0
HEADACHES: 0
PND: 0
DIAPHORESIS: 0
ABDOMINAL PAIN: 0
BLURRED VISION: 0
TREMORS: 0
BACK PAIN: 0
FALLS: 0
EYE PAIN: 0
HEADACHES: 0
HEARTBURN: 0
HEMOPTYSIS: 0
BACK PAIN: 0
WEIGHT LOSS: 0
WHEEZING: 0
TREMORS: 0
POLYDIPSIA: 0
CONSTIPATION: 0
SPUTUM PRODUCTION: 1
ORTHOPNEA: 0
FOCAL WEAKNESS: 0
MYALGIAS: 0
HEMOPTYSIS: 0
VOMITING: 0
PHOTOPHOBIA: 0
SINUS PAIN: 1
CLAUDICATION: 0
WEIGHT LOSS: 0
FOCAL WEAKNESS: 0
DIAPHORESIS: 0
BLOOD IN STOOL: 0
HALLUCINATIONS: 0
WHEEZING: 1
DIARRHEA: 0
SINUS PAIN: 0
BLURRED VISION: 0
STRIDOR: 0
SINUS PAIN: 0
MYALGIAS: 0
WEAKNESS: 0
SPUTUM PRODUCTION: 0
ABDOMINAL PAIN: 0
WHEEZING: 0
DEPRESSION: 0
FEVER: 1
COUGH: 1
EYE PAIN: 0
CHILLS: 0
ABDOMINAL PAIN: 0
PHOTOPHOBIA: 0
EYE REDNESS: 0
FALLS: 0
CONSTIPATION: 0
DIAPHORESIS: 0
DIZZINESS: 0
STRIDOR: 0
NAUSEA: 0
PALPITATIONS: 0
FALLS: 0
PHOTOPHOBIA: 0
DIARRHEA: 0
VOMITING: 0
SHORTNESS OF BREATH: 0
TINGLING: 0
ORTHOPNEA: 0
HEMOPTYSIS: 0
CONSTIPATION: 0
MYALGIAS: 0
BLURRED VISION: 0
ORTHOPNEA: 0
SORE THROAT: 0
COUGH: 1
SHORTNESS OF BREATH: 1
BRUISES/BLEEDS EASILY: 0
PND: 0
SPEECH CHANGE: 0
NAUSEA: 0
CHILLS: 0
SENSORY CHANGE: 0
SHORTNESS OF BREATH: 1
PALPITATIONS: 0
ABDOMINAL PAIN: 0
MYALGIAS: 0
SORE THROAT: 0
DIZZINESS: 0

## 2019-01-01 ASSESSMENT — LIFESTYLE VARIABLES
TOTAL SCORE: 0
HAVE PEOPLE ANNOYED YOU BY CRITICIZING YOUR DRINKING: NO
EVER HAD A DRINK FIRST THING IN THE MORNING TO STEADY YOUR NERVES TO GET RID OF A HANGOVER: NO
HAVE YOU EVER FELT YOU SHOULD CUT DOWN ON YOUR DRINKING: NO
SUBSTANCE_ABUSE: 0
TOTAL SCORE: 0
EVER FELT BAD OR GUILTY ABOUT YOUR DRINKING: NO
DO YOU DRINK ALCOHOL: YES
DOES PATIENT WANT TO STOP DRINKING: NO
CONSUMPTION TOTAL: INCOMPLETE
TOTAL SCORE: 0

## 2019-01-01 ASSESSMENT — PATIENT HEALTH QUESTIONNAIRE - PHQ9
2. FEELING DOWN, DEPRESSED, IRRITABLE, OR HOPELESS: NOT AT ALL
1. LITTLE INTEREST OR PLEASURE IN DOING THINGS: NOT AT ALL
SUM OF ALL RESPONSES TO PHQ9 QUESTIONS 1 AND 2: 0

## 2019-01-01 ASSESSMENT — PULMONARY FUNCTION TESTS
EPAP_CMH2O: 8
EPAP_CMH2O: 8
EPAP_CMH2O: 5
EPAP_CMH2O: 8

## 2019-05-23 NOTE — TELEPHONE ENCOUNTER
Pt called and left a VM that he wanted us to fax his CT order to [facility].  I faxed the order at 1:13pm on 05/23/2019

## 2019-05-23 NOTE — PROGRESS NOTES
Chief Complaint   Patient presents with   • Establish Care     referral DELPHINE Pedroza        HPI: This patient is a 63 y.o. male presenting for evaluation of abnormal PFTs.  The patient's past medical history is significant for hypertension, degenerative joint disease status post bilateral knee replacement and bilateral shoulder replacement, seasonal allergic rhinitis.  The patient is a lifelong non-smoker, does not drink alcohol.  He currently works as an  and does welding.  He does have a history of asbestos exposure although indirectly in the 1970s and again minor exposure in the 1980s when working for [employer].  He has no history of mold exposure, no birds at home.  He was given pulmonary function test prior to starting a new job for which he was going to have to wear a respirator.  These were notable for mixed restrictive obstructive pattern.  His FEV1 was 2.28 L at 59% predicted with an FVC of 3.86 L or 73% predicted and a ratio of 60%.  His total lung capacity was reduced at 76% and his DLCO was 82% uncorrected.  He was denied the ability to start his job and seen by a physician locally where he lives and a CT chest was ordered.  This was read as possible basilar scarring versus atelectasis and mild emphysematous changes.  Per my read he has 1 or 2 small blebs but no significant emphysema and what appears to be basilar atelectasis.  No concerning nodules or obvious interstitial lung disease.  With regards to his pulmonary symptoms, the patient does endorse dyspnea on exertion that has been worse over the past 1 to 2 years.  Per wife and patient he has had 20 pound weight gain over the past 2 years.  He denies chest pain exertional or otherwise, no cough, no wheezing.  No fevers, chills, night sweats.  Per wife the patient does snore extensively and she has witnessed him quit breathing on multiple occasions.  The patient himself denies excessive daytime sleepiness however the wife reports  patient will follow sleep at any chance during the day when resting.    Past Medical History:   Diagnosis Date   • Obesity        Social History     Social History   • Marital status: Single     Spouse name: N/A   • Number of children: N/A   • Years of education: N/A     Occupational History   • Not on file.     Social History Main Topics   • Smoking status: Never Smoker   • Smokeless tobacco: Former User     Types: Chew     Quit date: 5/23/2016   • Alcohol use 2.4 - 4.8 oz/week     4 - 8 Cans of beer per week   • Drug use: No   • Sexual activity: Not on file     Other Topics Concern   • Not on file     Social History Narrative   • No narrative on file       Family History   Problem Relation Age of Onset   • Cancer Mother        No current outpatient prescriptions on file prior to visit.     No current facility-administered medications on file prior to visit.        Allergies: Patient has no known allergies.    ROS:   Review of Systems   Constitutional: Negative for chills, diaphoresis, fever, malaise/fatigue and weight loss.   HENT: Positive for congestion. Negative for ear discharge, ear pain, hearing loss, nosebleeds, sinus pain, sore throat and tinnitus.    Eyes: Negative for blurred vision, double vision, photophobia, pain, discharge and redness.   Respiratory: Positive for shortness of breath. Negative for cough, hemoptysis, sputum production, wheezing and stridor.    Cardiovascular: Negative for chest pain, palpitations, orthopnea, claudication, leg swelling and PND.   Gastrointestinal: Negative for abdominal pain, constipation, diarrhea, heartburn, nausea and vomiting.   Genitourinary: Negative for dysuria and urgency.   Musculoskeletal: Negative for back pain, falls, joint pain, myalgias and neck pain.   Skin: Negative for itching and rash.   Neurological: Negative for dizziness, tremors, speech change, focal weakness, weakness and headaches.   Endo/Heme/Allergies: Negative for environmental allergies.  "  Psychiatric/Behavioral: Negative for depression.       /82 (BP Location: Right arm, Patient Position: Sitting, BP Cuff Size: Large adult)   Pulse 96   Temp 37 °C (98.6 °F) (Temporal)   Resp 16   Ht 1.854 m (6' 1\")   Wt (!) 127 kg (280 lb)   SpO2 91%     Physical Exam:  Physical Exam   Constitutional: He is oriented to person, place, and time. He appears well-developed.   obese   HENT:   Head: Normocephalic and atraumatic.   Mouth/Throat: Oropharyngeal exudate present.   Eyes: Pupils are equal, round, and reactive to light. EOM are normal. No scleral icterus.   B/l conjunctival injection   Neck: Normal range of motion. Neck supple. No tracheal deviation present.   Cardiovascular: Normal rate, regular rhythm and normal heart sounds.  Exam reveals no gallop and no friction rub.    No murmur heard.  Pulmonary/Chest: Effort normal and breath sounds normal. No respiratory distress. He has no wheezes. He has no rales.   Abdominal: Soft. He exhibits no distension.   Musculoskeletal: Normal range of motion. He exhibits edema. He exhibits no deformity.   Trace b/l LE edema   Neurological: He is alert and oriented to person, place, and time. No cranial nerve deficit.   Skin: Skin is warm and dry. No rash noted.   plethoric   Psychiatric: He has a normal mood and affect.       PFTs as reviewed by me personally: PFTs reviewed as per HPI    Imaging as reviewed by me personally: Reviewed by me as per HPI    Assessment:  1. Excessive daytime sleepiness  Polysomnography, 4 or More   2. Hypertension, unspecified type     3. Mixed restrictive and obstructive lung disease (HCC)  CT-CHEST, HIGH RESOLUTION LUNG   4. Class 2 obesity with body mass index (BMI) of 36.0 to 36.9 in adult, unspecified obesity type, unspecified whether serious comorbidity present         Plan:  1.  Patient has all signs and symptoms of obstructive sleep apnea with plethoric face, obesity, witnessed apneas and snoring.  He has had slow weight gain " over the past 2 years which puts him at risk.  We will obtain split-night sleep study and refer to sleep medicine if indicated for initiation of therapy.  2.  Currently well controlled on medication.  3.  I suspect the abnormality seen on his pulmonary function test are related mainly to his body habitus.  There are few mild emphysematous changes on his CT and no history of asthma or tobacco use.  I have low suspicion for interstitial lung disease although given scarring versus atelectasis at the bases and his history of occupational exposure we will follow this up with a high-resolution CAT scan with both supine and prone images.  4.  As per above he has had increased weight over the past 2 years which puts him at risk for sleep apnea as well as restrictive lung disease.  He may benefit from a healthy lifestyle program locally where he lives if possible referral to nutrition therapy for formal counseling.  Return in about 4 weeks (around 6/20/2019).

## 2019-05-23 NOTE — TELEPHONE ENCOUNTER
Patient's wife called and stated that we needed to fax the office note to [facility] so that they can schedule the sleep study.  I will fax it when the note is finished to [phone].

## 2019-05-23 NOTE — LETTER
Astrid Oglesby  [facility] Pulmonary Medicine   [address] [place] [address] [place] [address]  Phone: [phone] - Fax: [phone]           Encounter Date: 5/23/2019  Provider: Astrid Oglesby  Location of Care: [facility] PULMONARY MEDICINE      Patient:   Faisal Phillips   MR Number: [id number]   YOB: 1955     PROGRESS NOTE:  Chief Complaint   Patient presents with   • Establish Care     referral DELPHINE Pedroza        HPI: This patient is a 63 y.o. male presenting for evaluation of abnormal PFTs.  The patient's past medical history is significant for hypertension, degenerative joint disease status post bilateral knee replacement and bilateral shoulder replacement, seasonal allergic rhinitis.  The patient is a lifelong non-smoker, does not drink alcohol.  He currently works as an  and does welding.  He does have a history of asbestos exposure although indirectly in the 1970s and again minor exposure in the 1980s when working for [employer].  He has no history of mold exposure, no birds at home.  He was given pulmonary function test prior to starting a new job for which he was going to have to wear a respirator.  These were notable for mixed restrictive obstructive pattern.  His FEV1 was 2.28 L at 59% predicted with an FVC of 3.86 L or 73% predicted and a ratio of 60%.  His total lung capacity was reduced at 76% and his DLCO was 82% uncorrected.  He was denied the ability to start his job and seen by a physician locally where he lives and a CT chest was ordered.  This was read as possible basilar scarring versus atelectasis and mild emphysematous changes.  Per my read he has 1 or 2 small blebs but no significant emphysema and what appears to be basilar atelectasis.  No concerning nodules or obvious interstitial lung disease.  With regards to his pulmonary symptoms, the patient does endorse dyspnea on exertion that has been worse over the past 1 to 2  years.  Per wife and patient he has had 20 pound weight gain over the past 2 years.  He denies chest pain exertional or otherwise, no cough, no wheezing.  No fevers, chills, night sweats.  Per wife the patient does snore extensively and she has witnessed him quit breathing on multiple occasions.  The patient himself denies excessive daytime sleepiness however the wife reports patient will follow sleep at any chance during the day when resting.    Past Medical History:   Diagnosis Date   • Obesity        Social History     Social History   • Marital status: Single     Spouse name: N/A   • Number of children: N/A   • Years of education: N/A     Occupational History   • Not on file.     Social History Main Topics   • Smoking status: Never Smoker   • Smokeless tobacco: Former User     Types: Chew     Quit date: 5/23/2016   • Alcohol use 2.4 - 4.8 oz/week     4 - 8 Cans of beer per week   • Drug use: No   • Sexual activity: Not on file     Other Topics Concern   • Not on file     Social History Narrative   • No narrative on file       Family History   Problem Relation Age of Onset   • Cancer Mother        No current outpatient prescriptions on file prior to visit.     No current facility-administered medications on file prior to visit.        Allergies: Patient has no known allergies.    ROS:   Review of Systems   Constitutional: Negative for chills, diaphoresis, fever, malaise/fatigue and weight loss.   HENT: Positive for congestion. Negative for ear discharge, ear pain, hearing loss, nosebleeds, sinus pain, sore throat and tinnitus.    Eyes: Negative for blurred vision, double vision, photophobia, pain, discharge and redness.   Respiratory: Positive for shortness of breath. Negative for cough, hemoptysis, sputum production, wheezing and stridor.    Cardiovascular: Negative for chest pain, palpitations, orthopnea, claudication, leg swelling and PND.   Gastrointestinal: Negative for abdominal pain, constipation,  "diarrhea, heartburn, nausea and vomiting.   Genitourinary: Negative for dysuria and urgency.   Musculoskeletal: Negative for back pain, falls, joint pain, myalgias and neck pain.   Skin: Negative for itching and rash.   Neurological: Negative for dizziness, tremors, speech change, focal weakness, weakness and headaches.   Endo/Heme/Allergies: Negative for environmental allergies.   Psychiatric/Behavioral: Negative for depression.       /82 (BP Location: Right arm, Patient Position: Sitting, BP Cuff Size: Large adult)   Pulse 96   Temp 37 °C (98.6 °F) (Temporal)   Resp 16   Ht 1.854 m (6' 1\")   Wt (!) 127 kg (280 lb)   SpO2 91%     Physical Exam:  Physical Exam   Constitutional: He is oriented to person, place, and time. He appears well-developed.   obese   HENT:   Head: Normocephalic and atraumatic.   Mouth/Throat: Oropharyngeal exudate present.   Eyes: Pupils are equal, round, and reactive to light. EOM are normal. No scleral icterus.   B/l conjunctival injection   Neck: Normal range of motion. Neck supple. No tracheal deviation present.   Cardiovascular: Normal rate, regular rhythm and normal heart sounds.  Exam reveals no gallop and no friction rub.    No murmur heard.  Pulmonary/Chest: Effort normal and breath sounds normal. No respiratory distress. He has no wheezes. He has no rales.   Abdominal: Soft. He exhibits no distension.   Musculoskeletal: Normal range of motion. He exhibits edema. He exhibits no deformity.   Trace b/l LE edema   Neurological: He is alert and oriented to person, place, and time. No cranial nerve deficit.   Skin: Skin is warm and dry. No rash noted.   plethoric   Psychiatric: He has a normal mood and affect.       PFTs as reviewed by me personally: PFTs reviewed as per HPI    Imaging as reviewed by me personally: Reviewed by me as per HPI    Assessment:  1. Excessive daytime sleepiness  Polysomnography, 4 or More   2. Hypertension, unspecified type     3. Mixed restrictive " and obstructive lung disease (HCC)  CT-CHEST, HIGH RESOLUTION LUNG   4. Class 2 obesity with body mass index (BMI) of 36.0 to 36.9 in adult, unspecified obesity type, unspecified whether serious comorbidity present         Plan:  1.  Patient has all signs and symptoms of obstructive sleep apnea with plethoric face, obesity, witnessed apneas and snoring.  He has had slow weight gain over the past 2 years which puts him at risk.  We will obtain split-night sleep study and refer to sleep medicine if indicated for initiation of therapy.  2.  Currently well controlled on medication.  3.  I suspect the abnormality seen on his pulmonary function test are related mainly to his body habitus.  There are few mild emphysematous changes on his CT and no history of asthma or tobacco use.  I have low suspicion for interstitial lung disease although given scarring versus atelectasis at the bases and his history of occupational exposure we will follow this up with a high-resolution CAT scan with both supine and prone images.  4.  As per above he has had increased weight over the past 2 years which puts him at risk for sleep apnea as well as restrictive lung disease.  He may benefit from a healthy lifestyle program locally where he lives if possible referral to nutrition therapy for formal counseling.  Return in about 4 weeks (around 6/20/2019).          Electronically signed by Astrid Oglesby  on 05/23/19    Shiva Pedroza M.D.  [address] [facility] [address]  VIA Facsimile: [phone]

## 2019-08-06 NOTE — PROGRESS NOTES
Chief Complaint   Patient presents with   • Follow-Up     last seen 5/23/19    • Results     HRCT samueltravis russill 6/5/19, Sleep study 6/4/19 bantravis russill          HPI: This patient is a 63 y.o. male whom is followed in our clinic for abnormal PFTs last seen by me on 5/23/19.  The patient's past medical history is significant for hypertension, degenerative joint disease status post bilateral knee replacement and bilateral shoulder replacement, seasonal allergic rhinitis.  The patient is a lifelong non-smoker, does not drink alcohol. He currently works as an  and does welding.  He does have a history of asbestos exposure although indirectly in the 1970s and again minor exposure in the 1980s when working for [employer].  He underwent PFTs prior to starting a new job for which he was going to have to wear a respirator.  These were notable for mixed restrictive obstructive pattern.  His FEV1 was 2.28 L at 59% predicted with an FVC of 3.86 L or 73% predicted and a ratio of 60%.  His total lung capacity was reduced at 76% and his DLCO was 82% uncorrected.  He was denied the ability to start his job and seen by a physician locally where he lives and a CT chest was ordered.  This was read as possible basilar scarring versus atelectasis and mild emphysematous changes.  Per my read he has 1 or 2 small blebs but no significant emphysema and what appears to be basilar atelectasis. I was  concerned for OSH/LITTLE given 20 lb weight gain over past 2 years and plethoric face.  We ordered split night sleep study which was done at [facility] last month and demonstrated severe AHI of 127.  The highest CPAP tested at 12 cm H2O was not effective at tx LITTLE or ongoing hypoxia.  He also had HRCT which shows no e/o pulmonary paranchymal disease. He has no new complaints today.  He is having BP meds adjusted due to poorly controlled HTN and he notes ongoing b/l LE edema. No cough/SOB.    Past Medical History:   Diagnosis  Date   • Obesity        Social History     Socioeconomic History   • Marital status: Single     Spouse name: Not on file   • Number of children: Not on file   • Years of education: Not on file   • Highest education level: Not on file   Occupational History   • Not on file   Social Needs   • Financial resource strain: Not on file   • Food insecurity:     Worry: Not on file     Inability: Not on file   • Transportation needs:     Medical: Not on file     Non-medical: Not on file   Tobacco Use   • Smoking status: Never Smoker   • Smokeless tobacco: Former User     Types: Chew   Substance and Sexual Activity   • Alcohol use: Yes     Alcohol/week: 2.4 - 4.8 oz     Types: 4 - 8 Cans of beer per week   • Drug use: No   • Sexual activity: Not on file   Lifestyle   • Physical activity:     Days per week: Not on file     Minutes per session: Not on file   • Stress: Not on file   Relationships   • Social connections:     Talks on phone: Not on file     Gets together: Not on file     Attends [employer] service: Not on file     Active member of club or organization: Not on file     Attends meetings of clubs or organizations: Not on file     Relationship status: Not on file   • Intimate partner violence:     Fear of current or ex partner: Not on file     Emotionally abused: Not on file     Physically abused: Not on file     Forced sexual activity: Not on file   Other Topics Concern   • Not on file   Social History Narrative   • Not on file       Family History   Problem Relation Age of Onset   • Cancer Mother        Current Outpatient Medications on File Prior to Visit   Medication Sig Dispense Refill   • lisinopril (PRINIVIL) 20 MG Tab TK 1 T PO  D  3   • Fexofenadine HCl (ALLEGRA PO) Take  by mouth.     • lisinopril (PRINIVIL) 10 MG Tab TK 1 T PO  D  3     No current facility-administered medications on file prior to visit.        Patient has no known allergies.      ROS:   Review of Systems   Constitutional: Negative for  chills, diaphoresis, fever, malaise/fatigue and weight loss.   HENT: Negative for congestion, ear discharge, ear pain, hearing loss, nosebleeds, sinus pain, sore throat and tinnitus.    Eyes: Negative for blurred vision, double vision, photophobia, pain, discharge and redness.   Respiratory: Negative for cough, hemoptysis, sputum production, shortness of breath, wheezing and stridor.    Cardiovascular: Positive for leg swelling. Negative for chest pain, palpitations, orthopnea, claudication and PND.   Gastrointestinal: Negative for abdominal pain, constipation, diarrhea, heartburn, nausea and vomiting.   Genitourinary: Negative for dysuria and urgency.   Musculoskeletal: Negative for back pain, falls, joint pain, myalgias and neck pain.   Skin: Negative for itching and rash.   Neurological: Negative for dizziness, tremors, speech change, focal weakness, weakness and headaches.   Endo/Heme/Allergies: Negative for environmental allergies.   Psychiatric/Behavioral: Negative for depression.       /94 (BP Location: Left arm, Patient Position: Sitting, BP Cuff Size: Large adult)   Pulse 89   Temp 36.8 °C (98.2 °F) (Temporal)   Resp 18   Ht 1.829 m (6')   Wt (!) 130.2 kg (287 lb)   SpO2 92%   Physical Exam   Constitutional: He is oriented to person, place, and time. He appears well-developed and well-nourished. No distress.   obese   HENT:   Head: Normocephalic and atraumatic.   Mouth/Throat: Oropharynx is clear and moist. No oropharyngeal exudate.   Eyes: Pupils are equal, round, and reactive to light. EOM are normal. No scleral icterus.   B/l conjunctival injection   Neck: Normal range of motion. Neck supple. No tracheal deviation present.   Cardiovascular: Normal rate, regular rhythm and normal heart sounds. Exam reveals no gallop and no friction rub.   No murmur heard.  Pulmonary/Chest: Effort normal and breath sounds normal. No stridor. No respiratory distress. He has no wheezes.   Abdominal: Soft.   obese    Musculoskeletal: Normal range of motion. He exhibits edema.   B/l LE edema   Neurological: He is alert and oriented to person, place, and time. No cranial nerve deficit.   Skin: Skin is warm and dry.   plethhoric   Psychiatric: He has a normal mood and affect.       PFTs as reviewed by me personally: as per HPI    Sleep study reviewed as per HPI    Assessment:  1. LITTLE (obstructive sleep apnea)  Polysomnography Titration    REFERRAL TO SLEEP STUDIES   2. Mixed restrictive and obstructive lung disease (HCC)     3. Class 2 obesity with body mass index (BMI) of 36.0 to 36.9 in adult, unspecified obesity type, unspecified whether serious comorbidity present         Plan:  1. Severe and titration portion of study was inadequate.  Auto-pap 12-16 cm H2O recommended but there is no evidence that this will be effective based on titration that we have.  I believe a formal titration is indicated given severity of disease and co-existing conditions such as HTN. I have ordered titration study and referral to sleep medicine.  2. This is mainly secondary to body habitus. No smoking hx and no emphysema on CT.  No clinical hx of copd or asthma. Recommend increased activity, weight loss if able and tx of LITTLE.  3. Puts him at risk for both LITTLE and OHS.  He would benefit from increased activity even in the absence of weight loss.  Certainly he would benefit from healthy lifestyle program if amenable.   No indication for further pulmonary f/u once established in sleep medicine.   Return if symptoms worsen or fail to improve.

## 2019-08-07 NOTE — TELEPHONE ENCOUNTER
Should patient just complete sleep study at [facility]. Pt was informed during visit to complete with sanchez per Dr. Oglesby

## 2019-08-07 NOTE — TELEPHONE ENCOUNTER
Patient's spouse, Marcela returned call to schedule a SS and a NP Sleep Consult.  She demanded to know why patient can't do SS at [facility] and follow up with a doctor there. She is very unhappy that the appointments are in November.  The patient's appointments have been added to a wait list and the spouse has been advise of this. Marcela would like a return phone call from a MA.

## 2019-08-07 NOTE — TELEPHONE ENCOUNTER
Astrid Oglesby M.D.  You 24 minutes ago (10:02 AM)      Pt can do titration at [facility] if preferred.     Routing comment

## 2019-08-08 NOTE — TELEPHONE ENCOUNTER
Patient calling requesting I call Mary at his primary care's office in [place] (Dr Fantasma Pedroza) as he wants her to help him get scheduled for a sleep study at [facility]. Order,junior,office note and including referral to sleep faxed to PCP office. Patient aware that I spoke with Mary ans she states she will schedule patient.     Faxed confirmation scanned into [employer].

## 2019-10-01 PROBLEM — K70.30 ALCOHOLIC CIRRHOSIS OF LIVER WITHOUT ASCITES (HCC): Status: ACTIVE | Noted: 2019-01-01

## 2019-10-01 PROBLEM — E87.29 HIGH ANION GAP METABOLIC ACIDOSIS: Status: ACTIVE | Noted: 2019-01-01

## 2019-10-01 PROBLEM — I50.810 RVF (RIGHT VENTRICULAR FAILURE) (HCC): Status: ACTIVE | Noted: 2019-01-01

## 2019-10-01 PROBLEM — J96.01 ACUTE RESPIRATORY FAILURE WITH HYPOXIA AND HYPERCAPNIA (HCC): Status: ACTIVE | Noted: 2019-01-01

## 2019-10-01 PROBLEM — F10.932 ALCOHOL WITHDRAWAL SYNDROME WITH PERCEPTUAL DISTURBANCE (HCC): Status: ACTIVE | Noted: 2019-01-01

## 2019-10-01 PROBLEM — J96.02 ACUTE RESPIRATORY FAILURE WITH HYPOXIA AND HYPERCAPNIA (HCC): Status: ACTIVE | Noted: 2019-01-01

## 2019-10-01 PROBLEM — I27.20 PULMONARY HTN (HCC): Status: ACTIVE | Noted: 2019-01-01

## 2019-10-01 NOTE — FLOWSHEET NOTE
10/01/19 1405   Events/Summary/Plan   Non-Invasive Resp Device Site Inspection Completed Intact   General Vent Information   Pulse Oximetry 94 %   Heart Rate (Monitored) (!) 105   BiPAP for Respiratory Failure   #BiPap Initial Day  Yes   IPAP (cmH2O) 10   EPAP (cm H2O) 5   FiO2 40   Alarms Set / Checked Yes   Non-Invasive Temp (C) 31   Respiratory Rate Patient 31   Spontaneous Vt (ml) 798   Spontaneous Ve (LPM) 14.5   Breath Sounds   RUL Breath Sounds Diminished   RML Breath Sounds Diminished   RLL Breath Sounds Diminished   TELLO Breath Sounds Diminished   LLL Breath Sounds Diminished

## 2019-10-01 NOTE — PROGRESS NOTES
CHIEF COMPLAINT  Chief Complaint   Patient presents with   • Abdominal Pain   • Shortness of Breath       HPI  Faisal Phillips is a 63 y.o. male with a past medical history of LITTLE recently started on CPAP, hypertension, seasonal allergies who presents to the emergency department for shortness of breath.  Patient states he has had shortness of breath for few weeks in duration.  Also states that he has gained weight over this time.  Also complains of a dry cough.  Patient denies any chest pressure or chest pain.  Denies any associated fever or chills.  Denies any sick contacts.  States that he usually weighs about 225 pounds but is noted to weigh about 318 pounds today in clinic.  Patient presented clinic was noted to be in distress and was care flighted over to [facility].  Patient also complains of constipation, states he has not had a bowel movement in the last 2 to 3 days.      REVIEW OF SYSTEMS  Positives as above. Pertinent negatives include fever, chills, recent travel, sick contacts, diarrhea.  All other review of systems are negative    PAST MEDICAL HISTORY  Past Medical History:   Diagnosis Date   • Obesity        FAMILY HISTORY  Noncontributory    SOCIAL HISTORY  Social History     Socioeconomic History   • Marital status: Single     Spouse name: Not on file   • Number of children: Not on file   • Years of education: Not on file   • Highest education level: Not on file   Occupational History   • Not on file   Social Needs   • Financial resource strain: Not on file   • Food insecurity:     Worry: Not on file     Inability: Not on file   • Transportation needs:     Medical: Not on file     Non-medical: Not on file   Tobacco Use   • Smoking status: Never Smoker   • Smokeless tobacco: Former User     Types: Chew   Substance and Sexual Activity   • Alcohol use: Yes     Alcohol/week: 2.4 - 4.8 oz     Types: 4 - 8 Cans of beer per week   • Drug use: No   • Sexual activity: Not on file   Lifestyle   •  "Physical activity:     Days per week: Not on file     Minutes per session: Not on file   • Stress: Not on file   Relationships   • Social connections:     Talks on phone: Not on file     Gets together: Not on file     Attends Protestant service: Not on file     Active member of club or organization: Not on file     Attends meetings of clubs or organizations: Not on file     Relationship status: Not on file   • Intimate partner violence:     Fear of current or ex partner: Not on file     Emotionally abused: Not on file     Physically abused: Not on file     Forced sexual activity: Not on file   Other Topics Concern   • Not on file   Social History Narrative   • Not on file       SURGICAL HISTORY  Past Surgical History:   Procedure Laterality Date   • ARTHROSCOPY, KNEE     • GASTROSTOMY         CURRENT MEDICATIONS  Home Medications     Reviewed by Lalo Amador (Pharmacy Tech) on 10/01/19 at 1520  Med List Status: Complete   Medication Last Dose Status   lisinopril (PRINIVIL) 20 MG Tab 10/1/2019 Active                ALLERGIES  No Known Allergies    PHYSICAL EXAM  VITAL SIGNS: /83   Pulse (!) 104   Temp 36.5 °C (97.7 °F) (Temporal)   Resp (!) 26   Ht 1.854 m (6' 1\")   Wt (!) 144.2 kg (318 lb)   SpO2 95%   BMI 41.96 kg/m²      Constitutional: Obese, mild respiratory distress, Non-toxic appearance.   Eyes: PERRLA, EOMI, Conjunctiva normal, No discharge.   Cardiovascular: Tachycardic, normal rhythm, No murmurs, No rubs, possible S3 gallop, and intact distal pulses.   Thorax & Lungs: Distant breath sounds, no rales, no rhonchi, No wheezing, No chest wall tenderness.   Abdomen: Minimal bowel sounds, large, protuberant abdomen, distended, soft, No tenderness, No guarding, No rebound, No pulsatile masses  Skin: Warm, Dry, No erythema, No rash.   Extremities: Full range of motion, no deformity, 2+ pitting edema to upper thighs  Neurologic: Alert & oriented x 3, No focal deficits noted, acting appropriately " on exam.  Psychiatric: Affect normal for clinical presentation.      RADIOLOGY/PROCEDURES  CT-ABDOMEN-PELVIS WITH   Final Result      1.  Slightly nodular hepatic contour, suggestive of underlying hepatic fibrosis. No suspicious hepatic lesions.   2.  Small volume ascites.   3.  Mural thickening of multiple small bowel loops may be due to passive congestion related to portal enteropathy or due to acute enteritis. No bowel obstruction.      CT-CTA CHEST PULMONARY ARTERY W/ RECONS   Final Result      1.  No pulmonary embolus. No acute abnormality in the chest.   2.  Mild atelectasis in the lower lobes.   3.  Slightly nodular hepatic contour, which may be due to underlying hepatic fibrosis.   4.  Partially visualized ascites.            DX-CHEST-LIMITED (1 VIEW)   Final Result      1.  Bibasilar atelectasis. No focal consolidation or pleural effusions.   2.  Borderline cardiomegaly.      EC-ECHOCARDIOGRAM LTD W/O CONT   Final Result            COURSE & MEDICAL DECISION MAKING  Pertinent Labs & Imaging studies reviewed. (See chart for details)    Patient presents with progressive shortness of breath worse over the last few weeks.  Noted to be initially hypoxic to 66% on room air requiring 15 L.  Exam shows positive orthopnea, significant abdominal distention, distant heart and lung sounds, 2+ pedal edema.  Bedside ultrasound shows mild ascites.  Initial labs showed the cytosis to 14.9, macrocytosis, anion gap of 17, creatinine 0.9, troponin of 15, BNP of 866.  Started on diuresis.  Patient also started on BiPAP for worsening respiratory distress.  Echo shows hyperdynamic LV with LVEF of 75%, moderately dilated RV.  ABG overall normal.  CTPE ordered for dilated RV, CT abdomen pelvis for severe distention with possible ascites.  CT PE negative.  CT abdomen pelvis shows nodular hepatic contour consistent with fibrosis with mild ascites.  Sheikh catheter placed for possible obstruction.    Case discussed with hospitalist and  intensivist, Dr. Casillas who agreed to accept this patient for respiratory distress and use of BiPAP.      New Prescriptions    No medications on file       FINAL IMPRESSION    1. Acute respiratory failure with hypoxia (HCC) Active   2. SOB (shortness of breath) Active   3. Bilateral lower extremity edema Active   4. Hepatic fibrosis with mild ascites  5.  Leukocytosis  6.  Possible right-sided heart failure         Electronically signed by: Cristian Garza, 10/1/2019 2:04 PM

## 2019-10-01 NOTE — ED PROVIDER NOTES
ED Provider Note  CHIEF COMPLAINT      Chief Complaint   Patient presents with   • Abdominal Pain   • Shortness of Breath         HPI  Faisal Phillips is a 63 y.o. male with a past medical history of LITTLE recently started on CPAP, hypertension, seasonal allergies who presents to the emergency department for shortness of breath.  Patient states he has had shortness of breath for few weeks in duration.  Also states that he has gained weight over this time.  Also complains of a dry cough.  Patient denies any chest pressure or chest pain.  Denies any associated fever or chills.  Denies any sick contacts.  States that he usually weighs about 225 pounds but is noted to weigh about 318 pounds today in clinic.  Patient presented clinic was noted to be in distress and was care flighted over to [facility].  Patient also complains of constipation, states he has not had a bowel movement in the last 2 to 3 days.        REVIEW OF SYSTEMS  Positives as above. Pertinent negatives include fever, chills, recent travel, sick contacts, diarrhea.  All other review of systems are negative     PAST MEDICAL HISTORY  Past Medical History        Past Medical History:   Diagnosis Date   • Obesity              FAMILY HISTORY  Noncontributory     SOCIAL HISTORY  Social History               Socioeconomic History   • Marital status: Single       Spouse name: Not on file   • Number of children: Not on file   • Years of education: Not on file   • Highest education level: Not on file   Occupational History   • Not on file   Social Needs   • Financial resource strain: Not on file   • Food insecurity:       Worry: Not on file       Inability: Not on file   • Transportation needs:       Medical: Not on file       Non-medical: Not on file   Tobacco Use   • Smoking status: Never Smoker   • Smokeless tobacco: Former User       Types: Chew   Substance and Sexual Activity   • Alcohol use: Yes       Alcohol/week: 2.4 - 4.8 oz       Types: 4 - 8 Cans  "of beer per week   • Drug use: No   • Sexual activity: Not on file   Lifestyle   • Physical activity:       Days per week: Not on file       Minutes per session: Not on file   • Stress: Not on file   Relationships   • Social connections:       Talks on phone: Not on file       Gets together: Not on file       Attends Denominational service: Not on file       Active member of club or organization: Not on file       Attends meetings of clubs or organizations: Not on file       Relationship status: Not on file   • Intimate partner violence:       Fear of current or ex partner: Not on file       Emotionally abused: Not on file       Physically abused: Not on file       Forced sexual activity: Not on file   Other Topics Concern   • Not on file   Social History Narrative   • Not on file            SURGICAL HISTORY  Past Surgical History         Past Surgical History:   Procedure Laterality Date   • ARTHROSCOPY, KNEE       • GASTROSTOMY                CURRENT MEDICATIONS      Home Medications              Reviewed by Lalo Amador (Pharmacy Tech) on 10/01/19 at 1520  Med List Status: Complete          Medication Last Dose Status    lisinopril (PRINIVIL) 20 MG Tab 10/1/2019 Active                     ALLERGIES  No Known Allergies     PHYSICAL EXAM  VITAL SIGNS: /83   Pulse (!) 104   Temp 36.5 °C (97.7 °F) (Temporal)   Resp (!) 26   Ht 1.854 m (6' 1\")   Wt (!) 144.2 kg (318 lb)   SpO2 95%   BMI 41.96 kg/m²       Constitutional: Obese, mild respiratory distress, Non-toxic appearance.   Eyes: PERRLA, EOMI, Conjunctiva normal, No discharge.   Cardiovascular: Tachycardic, normal rhythm, No murmurs, No rubs, possible S3 gallop, and intact distal pulses.   Thorax & Lungs: Distant breath sounds, no rales, no rhonchi, No wheezing, No chest wall tenderness.   Abdomen: Minimal bowel sounds, large, protuberant abdomen, distended, soft, No tenderness, No guarding, No rebound, No pulsatile masses  Skin: Warm, Dry, No " erythema, No rash.   Extremities: Full range of motion, no deformity, 2+ pitting edema to upper thighs  Neurologic: Alert & oriented x 3, No focal deficits noted, acting appropriately on exam.  Psychiatric: Affect normal for clinical presentation.        RADIOLOGY/PROCEDURES  CT-ABDOMEN-PELVIS WITH   Final Result       1.  Slightly nodular hepatic contour, suggestive of underlying hepatic fibrosis. No suspicious hepatic lesions.   2.  Small volume ascites.   3.  Mural thickening of multiple small bowel loops may be due to passive congestion related to portal enteropathy or due to acute enteritis. No bowel obstruction.       CT-CTA CHEST PULMONARY ARTERY W/ RECONS   Final Result       1.  No pulmonary embolus. No acute abnormality in the chest.   2.  Mild atelectasis in the lower lobes.   3.  Slightly nodular hepatic contour, which may be due to underlying hepatic fibrosis.   4.  Partially visualized ascites.               DX-CHEST-LIMITED (1 VIEW)   Final Result       1.  Bibasilar atelectasis. No focal consolidation or pleural effusions.   2.  Borderline cardiomegaly.       EC-ECHOCARDIOGRAM LTD W/O CONT   Final Result                COURSE & MEDICAL DECISION MAKING  Pertinent Labs & Imaging studies reviewed. (See chart for details)     Patient presents with progressive shortness of breath worse over the last few weeks.  Noted to be initially hypoxic to 66% on room air requiring 15 L.  Exam shows positive orthopnea, significant abdominal distention, distant heart and lung sounds, 2+ pedal edema.  Bedside ultrasound shows mild ascites.  Initial labs showed the cytosis to 14.9, macrocytosis, anion gap of 17, creatinine 0.9, troponin of 15, BNP of 866.  Started on diuresis.  Patient also started on BiPAP for worsening respiratory distress.  Echo shows hyperdynamic LV with LVEF of 75%, moderately dilated RV.  ABG overall normal.  CTPE ordered for dilated RV, CT abdomen pelvis for severe distention with possible  ascites.  CT PE negative.  CT abdomen pelvis shows nodular hepatic contour consistent with fibrosis with mild ascites.  Sheikh catheter placed for possible obstruction.     Case discussed with hospitalist and intensivist, Dr. Casillas who agreed to accept this patient for respiratory distress and use of BiPAP.            New Prescriptions     No medications on file         FINAL IMPRESSION     1. Acute respiratory failure with hypoxia (HCC) Active   2. SOB (shortness of breath) Active   3. Bilateral lower extremity edema Active   4. Hepatic fibrosis with mild ascites  5.  Leukocytosis  6.  Possible right-sided heart failure     CRITICAL CARE TIME:    The patient required approximately 45 minutes worth of critical care time. This excludes any procedures. This includes time spent directly at caring for the patient, making critical medical decisions, involving consultants and speaking with the family.

## 2019-10-01 NOTE — ED TRIAGE NOTES
Pt flown in from clinic for abdominal pain and shortness of breath.  Pt states no BM (except small BM yesterday) for 1 week, no gas.  Pt found to be 68% on room air upon arrival.  Pt placed on 15L mask, mid80s to mid 90s.  Pt given 18g LAC by ems.  Pt given 12mg morphine, 8mg zofran PTA.  Nitro 20 mcg/min PTA.  Pt states a little cough noted, one time productive several days ago.  Pt continues to have abd pain, SOB.  Nitro gtt continuing upon arrival.  Pt has had 50# weight gain with lower leg swelling for 2 weeks.  Pt A&Ox4.

## 2019-10-01 NOTE — ED NOTES
Pt wife out asking for assistance.  Pt getting out of bed with BIPAP in place.  Pt standing at bedside.  Pt continuing to have increased work of breathing.  Pt and wife educated on need to stay in bed, have decreased work of breathing.  Will continue to monitor.

## 2019-10-01 NOTE — ED NOTES
Pt remains resting in bed at this time.  Sheikh in place, draining.  Pt tolerating BIPAP without difficulty.  Will continue to monitor.

## 2019-10-01 NOTE — ED NOTES
Pt assisted by float RN to bedside commode.  Pt now requesting BIPAP.  RT to room.  Pt reminded to not get out of bed.  Will continue to monitor.  RT in room.  Will continue to monitor.

## 2019-10-02 PROBLEM — F10.10 ALCOHOL ABUSE: Status: ACTIVE | Noted: 2019-01-01

## 2019-10-02 PROBLEM — R65.21 SEPTIC SHOCK (HCC): Status: ACTIVE | Noted: 2019-01-01

## 2019-10-02 PROBLEM — J18.9 PNEUMONIA: Status: ACTIVE | Noted: 2019-01-01

## 2019-10-02 PROBLEM — A41.9 SEPTIC SHOCK (HCC): Status: ACTIVE | Noted: 2019-01-01

## 2019-10-02 PROBLEM — K74.60 CIRRHOSIS (HCC): Status: ACTIVE | Noted: 2019-01-01

## 2019-10-02 PROBLEM — R57.9 SHOCK (HCC): Status: ACTIVE | Noted: 2019-01-01

## 2019-10-02 PROBLEM — N17.9 ACUTE KIDNEY INJURY (HCC): Status: ACTIVE | Noted: 2019-01-01

## 2019-10-02 NOTE — DISCHARGE PLANNING
Care Transition Team Discharge Planning    Anticipated Discharge Disposition: TBD    Action: Lsw spoke to Katya Zelaya. She indicates she is pt's ex-wife. She further indicates pt's dtr is Tamera Phillips. She is now  w/ a different last name and same phone number [phone]. Tamera is working as a  and not answering the phone at this time.    Pt is estranged from his dtr, and has not spoken to her in many years [5-10yrs]. She may not want to be part of decision making. Katya questions why pt's friend cannot make decisions. Katya will call Tamera at work and ask her to call this worker.     Lsw called bedside Rn to update, and will speak to him once he is available.     Barriers to Discharge: TBD    Plan: f/u w medical team, Tamera-pt's daughter

## 2019-10-02 NOTE — ASSESSMENT & PLAN NOTE
Patient will be admitted to the ICU with close cardiac monitoring on CIWA protocol  Started on rally bag, multivitamin, thiamine and folate  Replete electrolytes  Patient has been counseled on alcohol cessation and will be provided with information for outpatient detox facilities

## 2019-10-02 NOTE — ASSESSMENT & PLAN NOTE
Secondary to obesity hypoventilation syndrome  CT scan found bilateral atelectasis   Placed on BiPAP in the ED  Repeat ABG and adjust settings of BiPAP   Further management per critical care

## 2019-10-02 NOTE — PROCEDURES
"Arterial Line Insertion  Date/Time: 10/2/2019 12:58 AM  Performed by: Lencho Casillas Jr., D.O.  Authorized by: Lencho Casillas Jr., D.O.   Consent: The procedure was performed in an emergent situation.  Patient identity confirmed: arm band  Time out: Immediately prior to procedure a \"time out\" was called to verify the correct patient, procedure, equipment, support staff and site/side marked as required.  Preparation: Patient was prepped and draped in the usual sterile fashion.  Indications: multiple ABGs, respiratory failure and hemodynamic monitoring  Location: right radial    Sedation:  Patient sedated: yes  Sedation type: (Vent)    Olayinka's test normal: yes  Needle gauge: 20  Seldinger technique: Seldinger technique used  Number of attempts: 1  Post-procedure: line sutured and dressing applied  Post-procedure CMS: normal  Patient tolerance: Patient tolerated the procedure well with no immediate complications              "

## 2019-10-02 NOTE — PROCEDURES
Intubation  Date/Time: 10/2/2019 12:04 AM  Performed by: Lencho Casillas Jr., D.O.  Authorized by: Lencho Casillas Jr., D.O.     Consent:     Consent obtained:  Emergent situation  Pre-procedure details:     Patient status:  Altered mental status    Mallampati score:  I    Pretreatment meds: etomidate.    Paralytics:  Rocuronium  Procedure details:     Preoxygenation:  Bag valve mask    CPR in progress: no      Intubation method:  Oral    Oral intubation technique:  Video-assisted    Laryngoscope type:  GlideScope    Laryngoscope blade:  Mac 3    Cormack-Lehane Classification:  Grade 1    Tube size (mm):  8.0    Tube type:  Cuffed    Number of attempts:  1    Tube visualized through cords: yes    Placement assessment:     ETT to teeth:  23    Tube secured with:  ETT figueroa    Breath sounds:  Equal    Placement verification: chest rise, condensation, CXR verification, direct visualization, equal breath sounds, esophageal detector, ETCO2 detector and tube exhalation      CXR findings:  ETT in proper place  Post-procedure details:     Patient tolerance of procedure:  Tolerated well, no immediate complications

## 2019-10-02 NOTE — CARE PLAN
Problem: Fluid Volume:  Goal: Will maintain balanced intake and output  Outcome: NOT MET  Intervention: Monitor, educate, and encourage compliance with therapeutic intake of liquids  Note:   Minimal urine output, brawny edema up to diaphragm     Problem: Hemodynamic Status  Goal: Vital Signs and Fluid Balance Management  Outcome: NOT MET  Note:   Pt labile, requires vasopressor  therapy

## 2019-10-02 NOTE — CARE PLAN
Problem: Nutritional:  Goal: Nutrition support tolerated and meeting greater than 85% of estimated needs  Outcome: NOT MET    See RD note.

## 2019-10-02 NOTE — ED NOTES
Pt sitting at end of bed, pt educated on need to stay in bed and decrease work of breathing.  BIPAP in place.  Will continue to monitor.  Family at bedside.

## 2019-10-02 NOTE — CONSULTS
Critical Care Consultation    Date of consult: 10/1/2019    Referring Physician  James Tolentino M.D.    Reason for Consultation  Acute on chronic respiratory failure    History of Presenting Illness  63 y.o. male who with past medical history of obesity, LITTLE, hypertension, alcohol abuse presented 10/1/2019 with dyspnea.  Per the patient he has had progressive worsening of dyspnea over the last 1 year which acutely worsened over the last 2 weeks prompting him to present to the emergency department.  The patient reports that associated with this dyspnea is increasing edema of his lower extremities and extending up to his axilla.  He denies any history of heart failure or liver failure.  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  In the ER the patient was found to be hypoxic requiring placement of BiPAP.  A CT scan of his chest was negative for pulmonary embolus    mixed restrictive obstructive pattern.  His FEV1 was 2.28 L at 59% predicted with an FVC of 3.86 L or 73% predicted and a ratio of 60%.  His total lung capacity was reduced at 76% and his DLCO was 82% uncorrected    Code Status  Full Code    Review of Systems  ROS    Past Medical History   has a past medical history of Obesity.    Surgical History   has a past surgical history that includes gastrostomy and arthroscopy, knee.    Family History  family history includes Cancer in his mother.    Social History   reports that he has never smoked. He quit smokeless tobacco use about 3 years ago.  His smokeless tobacco use included chew. He reports that he drinks about 2.4 - 4.8 oz of alcohol per week. He reports that he does not use drugs.    Medications  Home Medications     Reviewed by Lalo Amador (Pharmacy Tech) on 10/01/19 at 1520  Med List Status: Complete   Medication Last Dose Status    lisinopril (PRINIVIL) 20 MG Tab 10/1/2019 Active              Current Facility-Administered Medications   Medication Dose Route Frequency Provider Last Rate Last Dose   • senna-docusate (PERICOLACE or SENOKOT S) 8.6-50 MG per tablet 2 Tab  2 Tab Oral BID Roselia Diego M.D.        And   • polyethylene glycol/lytes (MIRALAX) PACKET 1 Packet  1 Packet Oral QDAY PRN Roselia Diego M.D.        And   • magnesium hydroxide (MILK OF MAGNESIA) suspension 30 mL  30 mL Oral QDAY PRN Roselia Diego M.D.        And   • bisacodyl (DULCOLAX) suppository 10 mg  10 mg Rectal QDAY PRN Roselia Diego M.D.       • enoxaparin (LOVENOX) inj 40 mg  40 mg Subcutaneous DAILY Roselia Diego M.D.       • labetalol (NORMODYNE,TRANDATE) injection 10 mg  10 mg Intravenous Q4HRS PRN Roselia Diego M.D.       • ondansetron (ZOFRAN) syringe/vial injection 4 mg  4 mg Intravenous Q4HRS PRN Roselia Diego M.D.       • ondansetron (ZOFRAN ODT) dispertab 4 mg  4 mg Oral Q4HRS PRN Roselia Diego M.D.       • promethazine (PHENERGAN) tablet 12.5-25 mg  12.5-25 mg Oral Q4HRS PRJACE Diego M.D.       • promethazine (PHENERGAN) suppository 12.5-25 mg  12.5-25 mg Rectal Q4HRS PRJACE Diego M.D.       • prochlorperazine (COMPAZINE) injection 5-10 mg  5-10 mg Intravenous Q4HRS PRJACE Diego M.D.       • guaiFENesin dextromethorphan (ROBITUSSIN DM) 100-10 MG/5ML syrup 10 mL  10 mL Oral Q6HRS PRN Roselia Diego M.D.       • [START ON 10/2/2019] lisinopril (PRINIVIL) tablet 20 mg  20 mg Oral QAM Hamad Brandie, M.D.       • LORazepam (ATIVAN) injection 4 mg  4 mg Intravenous Q15 MIN PRN Roselia Diego M.D.        Or   • LORazepam (ATIVAN) injection 3 mg  3 mg Intravenous Q15 MIN PRN Roselia Diego M.D.        Or   • LORazepam (ATIVAN) injection 2 mg  2 mg Intravenous Q15 MIN PRN Roselia Diego M.D.        Or   • LORazepam (ATIVAN) injection 1 mg  1 mg Intravenous Q15 MIN PRN Roselia Diego M.D.       • thiamine tablet 100 mg  100 mg Oral DAILY Roselia Diego M.D.         And   • multivitamin (THERAGRAN) tablet 1 Tab  1 Tab Oral DAILY Roselia Diego M.D.        And   • folic acid (FOLVITE) tablet 1 mg  1 mg Oral DAILY Roselia Diego M.D.       • cloNIDine (CATAPRES) tablet 0.1 mg  0.1 mg Oral Q HOUR PRN Roselia Diego M.D.         Current Outpatient Medications   Medication Sig Dispense Refill   • lisinopril (PRINIVIL) 20 MG Tab Take 20 mg by mouth every morning.  3       Allergies  No Known Allergies    Vital Signs last 24 hours  Temp:  [36.5 °C (97.7 °F)] 36.5 °C (97.7 °F)  Pulse:  [102-112] 107  Resp:  [24-28] 24  BP: (106-179)/() 106/69  SpO2:  [93 %-97 %] 96 %    Physical Exam  Physical Exam    Fluids  No intake or output data in the 24 hours ending 10/01/19 1740    Laboratory  Recent Results (from the past 48 hour(s))   EKG (NOW)    Collection Time: 10/01/19  1:38 PM   Result Value Ref Range    Report       [facility] Emergency Dept.    Test Date:  2019-10-01  Pt Name:    RIZWANA SANCHEZ                Department: ER  MRN:        [id number]                      Room:       [facility]  Gender:     Male                         Technician: [id number]  :        1955                   Requested By:ER TRIAGE PROTOCOL  Order #:    [id number]                    Reading MD:    Measurements  Intervals                                Axis  Rate:       105                          P:          36  AZ:         168                          QRS:        54  QRSD:       92                           T:  QT:         360  QTc:        476    Interpretive Statements  SINUS TACHYCARDIA  PROBABLE LEFT ATRIAL ABNORMALITY  NONSPECIFIC T ABNORMALITIES, ANT-LAT LEADS  BORDERLINE PROLONGED QT INTERVAL  No previous ECG available for comparison     CBC WITH DIFFERENTIAL    Collection Time: 10/01/19  1:46 PM   Result Value Ref Range    WBC 14.9 (H) 4.8 - 10.8 K/uL    RBC 5.31 4.70 - 6.10 M/uL    Hemoglobin 18.0 14.0 - 18.0 g/dL    Hematocrit 55.8 (H) 42.0 - 52.0 %    .1 (H) 81.4 -  97.8 fL    MCH 33.9 (H) 27.0 - 33.0 pg    MCHC 32.3 (L) 33.7 - 35.3 g/dL    RDW 63.7 (H) 35.9 - 50.0 fL    Platelet Count 196 164 - 446 K/uL    MPV 9.6 9.0 - 12.9 fL    Neutrophils-Polys 87.20 (H) 44.00 - 72.00 %    Lymphocytes 5.40 (L) 22.00 - 41.00 %    Monocytes 6.10 0.00 - 13.40 %    Eosinophils 0.00 0.00 - 6.90 %    Basophils 0.30 0.00 - 1.80 %    Immature Granulocytes 1.00 (H) 0.00 - 0.90 %    Nucleated RBC 0.00 /100 WBC    Neutrophils (Absolute) 12.96 (H) 1.82 - 7.42 K/uL    Lymphs (Absolute) 0.80 (L) 1.00 - 4.80 K/uL    Monos (Absolute) 0.90 (H) 0.00 - 0.85 K/uL    Eos (Absolute) 0.00 0.00 - 0.51 K/uL    Baso (Absolute) 0.04 0.00 - 0.12 K/uL    Immature Granulocytes (abs) 0.15 (H) 0.00 - 0.11 K/uL    NRBC (Absolute) 0.00 K/uL   COMP METABOLIC PANEL    Collection Time: 10/01/19  1:46 PM   Result Value Ref Range    Sodium 140 135 - 145 mmol/L    Potassium 3.6 3.6 - 5.5 mmol/L    Chloride 102 96 - 112 mmol/L    Co2 21 20 - 33 mmol/L    Anion Gap 17.0 (H) 0.0 - 11.9    Glucose 164 (H) 65 - 99 mg/dL    Bun 30 (H) 8 - 22 mg/dL    Creatinine 0.94 0.50 - 1.40 mg/dL    Calcium 8.5 8.5 - 10.5 mg/dL    AST(SGOT) 17 12 - 45 U/L    ALT(SGPT) 25 2 - 50 U/L    Alkaline Phosphatase 125 (H) 30 - 99 U/L    Total Bilirubin 1.3 0.1 - 1.5 mg/dL    Albumin 3.8 3.2 - 4.9 g/dL    Total Protein 6.5 6.0 - 8.2 g/dL    Globulin 2.7 1.9 - 3.5 g/dL    A-G Ratio 1.4 g/dL   TROPONIN    Collection Time: 10/01/19  1:46 PM   Result Value Ref Range    Troponin T 15 6 - 19 ng/L   proBrain Natriuretic Peptide, NT    Collection Time: 10/01/19  1:46 PM   Result Value Ref Range    NT-proBNP 866 (H) 0 - 125 pg/mL   Prothrombin Time    Collection Time: 10/01/19  1:46 PM   Result Value Ref Range    PT 14.4 12.0 - 14.6 sec    INR 1.09 0.87 - 1.13   ESTIMATED GFR    Collection Time: 10/01/19  1:46 PM   Result Value Ref Range    GFR If African American >60 >60 mL/min/1.73 m 2    GFR If Non African American >60 >60 mL/min/1.73 m 2   URINALYSIS CULTURE, IF  INDICATED    Collection Time: 10/01/19  2:22 PM   Result Value Ref Range    Color DK Yellow     Character Clear     Specific Gravity 1.028 <1.035    Ph 5.0 5.0 - 8.0    Glucose Negative Negative mg/dL    Ketones Trace (A) Negative mg/dL    Protein 30 (A) Negative mg/dL    Bilirubin Small (A) Negative    Urobilinogen, Urine 1.0 Negative    Nitrite Negative Negative    Leukocyte Esterase Negative Negative    Occult Blood Negative Negative    Micro Urine Req Microscopic    URINE MICROSCOPIC (W/UA)    Collection Time: 10/01/19  2:22 PM   Result Value Ref Range    WBC 0-2 (A) /hpf    RBC 5-10 (A) /hpf    Bacteria Negative None /hpf    Epithelial Cells Negative /hpf    Hyaline Cast 6-10 (A) /lpf   EC-ECHOCARDIOGRAM LTD W/O CONT    Collection Time: 10/01/19  2:59 PM   Result Value Ref Range    Left Ventrical Ejection Fraction 75    ARTERIAL BLOOD GAS w/ O2 (LAB)    Collection Time: 10/01/19  3:17 PM   Result Value Ref Range    Ph 7.38 (L) 7.40 - 7.50    Pco2 33.5 26.0 - 37.0 mmHg    Po2 79.1 64.0 - 87.0 mmHg    O2 Saturation 94.6 93.0 - 99.0 %    Hco3 19 17 - 25 mmol/L    Base Excess -5 (L) -4 - 3 mmol/L    Body Temp 34.1 Centigrade    O2 Therapy 10.0 2.0 - 10.0 L/min    Ph -TC 7.42 7.40 - 7.50    Pco2 -TC 29.5 26.0 - 37.0 mmHg    Po2 -TC 65.3 64.0 - 87.0 mmHg       Imaging  CT-ABDOMEN-PELVIS WITH   Final Result      1.  Slightly nodular hepatic contour, suggestive of underlying hepatic fibrosis. No suspicious hepatic lesions.   2.  Small volume ascites.   3.  Mural thickening of multiple small bowel loops may be due to passive congestion related to portal enteropathy or due to acute enteritis. No bowel obstruction.      CT-CTA CHEST PULMONARY ARTERY W/ RECONS   Final Result      1.  No pulmonary embolus. No acute abnormality in the chest.   2.  Mild atelectasis in the lower lobes.   3.  Slightly nodular hepatic contour, which may be due to underlying hepatic fibrosis.   4.  Partially visualized ascites.             DX-CHEST-LIMITED (1 VIEW)   Final Result      1.  Bibasilar atelectasis. No focal consolidation or pleural effusions.   2.  Borderline cardiomegaly.      EC-ECHOCARDIOGRAM LTD W/O CONT   Final Result      US-ABDOMEN COMPLETE SURVEY    (Results Pending)   [id number] VIEW    (Results Pending)       Assessment/Plan  No new Assessment & Plan notes have been filed under this hospital service since the last note was generated.  Service: Pulmonary      Discussed patient condition and risk of morbidity and/or mortality with {Discussed with...:200210}.    The patient remains critically ill.  Critical care time = *** minutes in directly providing and coordinating critical care and extensive data review.  No time overlap and excludes procedures.

## 2019-10-02 NOTE — CONSULTS
Critical Care Consultation    Date of consult: 10/1/2019    Referring Physician  James Tolentino M.D.    Reason for Consultation  Acute on chronic respiratory failure    History of Presenting Illness  63 y.o. male who with past medical history of obesity, LITTLE, hypertension, alcohol abuse presented 10/1/2019 with dyspnea.  Per the patient he has had progressive worsening of dyspnea over the last 1 year which acutely worsened over the last 2 weeks prompting him to present to the emergency department.  The patient reports that associated with this dyspnea is increasing edema of his lower extremities and extending up to his axilla.  He denies any history of heart failure or liver failure.  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  In the ER the patient was found to be hypoxic requiring placement of BiPAP.  A CT scan of his chest was negative for pulmonary embolus.  CT of his abdomen showed a nodular liver suggesting hepatic fibrosis, small volume ascites and mural thickening of multiple small bowel loops.  Patient denies any diarrhea, constipation, or nausea.    Patient states that he recently started using his CPAP 2 days ago and follows with [facility], his last PFT in our records show a mixed restrictive/obstructive pattern.  His FEV1 was 2.28 L at 59% predicted with an FVC of 3.86 L or 73% predicted and a ratio of 60%.  His TLC was reduced at 76% and his DLCO was 82% uncorrected.  He does not currently use any inhalers nor oxygen at home.    Code Status  Full Code    Review of Systems  Review of Systems   Constitutional: Positive for chills and malaise/fatigue. Negative for fever.   HENT: Positive for congestion.    Respiratory: Positive for cough, sputum production and wheezing. Negative for shortness of breath and stridor.    Cardiovascular: Positive  for leg swelling. Negative for chest pain.   Gastrointestinal: Negative for abdominal pain, nausea and vomiting.   Genitourinary: Negative for dysuria.   Musculoskeletal: Negative for myalgias.   Skin: Negative for rash.   Neurological: Negative for dizziness, sensory change and focal weakness.       Past Medical History   has a past medical history of Obesity.    Surgical History   has a past surgical history that includes gastrostomy and arthroscopy, knee.    Family History  family history includes Cancer in his mother.    Social History   reports that he has never smoked. He quit smokeless tobacco use about 3 years ago.  His smokeless tobacco use included chew. He reports that he drinks about 2.4 - 4.8 oz of alcohol per week. He reports that he does not use drugs.    Medications  Home Medications     Reviewed by Lalo Amador (Pharmacy Tech) on 10/01/19 at 1520  Med List Status: Complete   Medication Last Dose Status   lisinopril (PRINIVIL) 20 MG Tab 10/1/2019 Active              Current Facility-Administered Medications   Medication Dose Route Frequency Provider Last Rate Last Dose   • senna-docusate (PERICOLACE or SENOKOT S) 8.6-50 MG per tablet 2 Tab  2 Tab Oral BID Roselia Diego M.D.        And   • polyethylene glycol/lytes (MIRALAX) PACKET 1 Packet  1 Packet Oral QDAY PRN Roselia Diego M.D.        And   • magnesium hydroxide (MILK OF MAGNESIA) suspension 30 mL  30 mL Oral QDAY PRN Roselia Diego M.D.        And   • bisacodyl (DULCOLAX) suppository 10 mg  10 mg Rectal QDAY PRN Roselia Diego M.D.       • enoxaparin (LOVENOX) inj 40 mg  40 mg Subcutaneous DAILY Roselia Diego M.D.       • labetalol (NORMODYNE,TRANDATE) injection 10 mg  10 mg Intravenous Q4HRS PRN Roselia Diego M.D.       • ondansetron (ZOFRAN) syringe/vial injection 4 mg  4 mg Intravenous Q4HRS PRN Roselia Diego M.D.       • ondansetron (ZOFRAN ODT) dispertab 4 mg  4 mg Oral Q4HRS PRN Roselia Diego M.D.       • promethazine (PHENERGAN)  tablet 12.5-25 mg  12.5-25 mg Oral Q4HRS PRN Roselia Diego M.D.       • promethazine (PHENERGAN) suppository 12.5-25 mg  12.5-25 mg Rectal Q4HRS PRN Roselia Diego M.D.       • prochlorperazine (COMPAZINE) injection 5-10 mg  5-10 mg Intravenous Q4HRS PRN Roselia Diego M.D.       • guaiFENesin dextromethorphan (ROBITUSSIN DM) 100-10 MG/5ML syrup 10 mL  10 mL Oral Q6HRS PRN Roselia Diego M.D.       • [START ON 10/2/2019] lisinopril (PRINIVIL) tablet 20 mg  20 mg Oral QAM Roselia Diego M.D.       • LORazepam (ATIVAN) injection 4 mg  4 mg Intravenous Q15 MIN PRN Roselia Diego M.D.        Or   • LORazepam (ATIVAN) injection 3 mg  3 mg Intravenous Q15 MIN PRN Roselia Diego M.D.        Or   • LORazepam (ATIVAN) injection 2 mg  2 mg Intravenous Q15 MIN PRN Roselia Diego M.D.        Or   • LORazepam (ATIVAN) injection 1 mg  1 mg Intravenous Q15 MIN PRN Roselia Diego M.D.       • thiamine tablet 100 mg  100 mg Oral DAILY Roselia Diego M.D.        And   • multivitamin (THERAGRAN) tablet 1 Tab  1 Tab Oral DAILY Roselia Diego M.D.        And   • folic acid (FOLVITE) tablet 1 mg  1 mg Oral DAILY Roselia Diego M.D.       • cloNIDine (CATAPRES) tablet 0.1 mg  0.1 mg Oral Q HOUR PRN Roselia Diego M.D.         Current Outpatient Medications   Medication Sig Dispense Refill   • lisinopril (PRINIVIL) 20 MG Tab Take 20 mg by mouth every morning.  3       Allergies  No Known Allergies    Vital Signs last 24 hours  Temp:  [36.5 °C (97.7 °F)] 36.5 °C (97.7 °F)  Pulse:  [102-112] 107  Resp:  [24-28] 24  BP: (106-179)/() 106/69  SpO2:  [93 %-97 %] 96 %    Physical Exam  Physical Exam   Constitutional: He is oriented to person, place, and time. He appears well-developed and well-nourished. He has a sickly appearance. He appears distressed. Face mask in place.   HENT:   Head: Normocephalic and atraumatic.   Right Ear: External ear normal.   Left Ear: External ear normal.   Mouth/Throat: Oropharynx is clear and moist.   Eyes: Conjunctivae  and EOM are normal. No scleral icterus.   Neck: Neck supple. No JVD present. No tracheal deviation present.   Cardiovascular: Regular rhythm and intact distal pulses. Tachycardia present.   Pulmonary/Chest: Accessory muscle usage present. Tachypnea noted. He is in respiratory distress. He has decreased breath sounds. He has rales.   Abdominal: Soft. Bowel sounds are normal. He exhibits distension. There is no rebound and no guarding.   Musculoskeletal: Normal range of motion. He exhibits edema (2-3+). He exhibits no tenderness.   Anasarca with edema extending from the lower extremities up to bilateral axilla   Neurological: He is alert and oriented to person, place, and time. No cranial nerve deficit. He exhibits normal muscle tone. Coordination normal.   Skin: Skin is warm and dry.   Venous stasis dermatitis of lower extremities   Psychiatric: He has a normal mood and affect.   Nursing note and vitals reviewed.      Fluids  No intake or output data in the 24 hours ending 10/01/19 1740    Laboratory  Recent Results (from the past 48 hour(s))   EKG (NOW)    Collection Time: 10/01/19  1:38 PM   Result Value Ref Range    Report       [facility] Emergency Dept.    Test Date:  2019-10-01  Pt Name:    RIZWANA SANCHEZ                Department: ER  MRN:        [id number]                      Room:        05  Gender:     Male                         Technician: [id number]  :        [id number]                   Requested By:ER TRIAGE PROTOCOL  Order #:    [id number]                    Reading MD:    Measurements  Intervals                                Axis  Rate:       105                          P:          36  AR:         168                          QRS:        54  QRSD:       92                           T:  QT:         360  QTc:        476    Interpretive Statements  SINUS TACHYCARDIA  PROBABLE LEFT ATRIAL ABNORMALITY  NONSPECIFIC T ABNORMALITIES, ANT-LAT LEADS  BORDERLINE PROLONGED QT INTERVAL  No  previous ECG available for comparison     CBC WITH DIFFERENTIAL    Collection Time: 10/01/19  1:46 PM   Result Value Ref Range    WBC 14.9 (H) 4.8 - 10.8 K/uL    RBC 5.31 4.70 - 6.10 M/uL    Hemoglobin 18.0 14.0 - 18.0 g/dL    Hematocrit 55.8 (H) 42.0 - 52.0 %    .1 (H) 81.4 - 97.8 fL    MCH 33.9 (H) 27.0 - 33.0 pg    MCHC 32.3 (L) 33.7 - 35.3 g/dL    RDW 63.7 (H) 35.9 - 50.0 fL    Platelet Count 196 164 - 446 K/uL    MPV 9.6 9.0 - 12.9 fL    Neutrophils-Polys 87.20 (H) 44.00 - 72.00 %    Lymphocytes 5.40 (L) 22.00 - 41.00 %    Monocytes 6.10 0.00 - 13.40 %    Eosinophils 0.00 0.00 - 6.90 %    Basophils 0.30 0.00 - 1.80 %    Immature Granulocytes 1.00 (H) 0.00 - 0.90 %    Nucleated RBC 0.00 /100 WBC    Neutrophils (Absolute) 12.96 (H) 1.82 - 7.42 K/uL    Lymphs (Absolute) 0.80 (L) 1.00 - 4.80 K/uL    Monos (Absolute) 0.90 (H) 0.00 - 0.85 K/uL    Eos (Absolute) 0.00 0.00 - 0.51 K/uL    Baso (Absolute) 0.04 0.00 - 0.12 K/uL    Immature Granulocytes (abs) 0.15 (H) 0.00 - 0.11 K/uL    NRBC (Absolute) 0.00 K/uL   COMP METABOLIC PANEL    Collection Time: 10/01/19  1:46 PM   Result Value Ref Range    Sodium 140 135 - 145 mmol/L    Potassium 3.6 3.6 - 5.5 mmol/L    Chloride 102 96 - 112 mmol/L    Co2 21 20 - 33 mmol/L    Anion Gap 17.0 (H) 0.0 - 11.9    Glucose 164 (H) 65 - 99 mg/dL    Bun 30 (H) 8 - 22 mg/dL    Creatinine 0.94 0.50 - 1.40 mg/dL    Calcium 8.5 8.5 - 10.5 mg/dL    AST(SGOT) 17 12 - 45 U/L    ALT(SGPT) 25 2 - 50 U/L    Alkaline Phosphatase 125 (H) 30 - 99 U/L    Total Bilirubin 1.3 0.1 - 1.5 mg/dL    Albumin 3.8 3.2 - 4.9 g/dL    Total Protein 6.5 6.0 - 8.2 g/dL    Globulin 2.7 1.9 - 3.5 g/dL    A-G Ratio 1.4 g/dL   TROPONIN    Collection Time: 10/01/19  1:46 PM   Result Value Ref Range    Troponin T 15 6 - 19 ng/L   proBrain Natriuretic Peptide, NT    Collection Time: 10/01/19  1:46 PM   Result Value Ref Range    NT-proBNP 866 (H) 0 - 125 pg/mL   Prothrombin Time    Collection Time: 10/01/19  1:46 PM    Result Value Ref Range    PT 14.4 12.0 - 14.6 sec    INR 1.09 0.87 - 1.13   ESTIMATED GFR    Collection Time: 10/01/19  1:46 PM   Result Value Ref Range    GFR If African American >60 >60 mL/min/1.73 m 2    GFR If Non African American >60 >60 mL/min/1.73 m 2   URINALYSIS CULTURE, IF INDICATED    Collection Time: 10/01/19  2:22 PM   Result Value Ref Range    Color DK Yellow     Character Clear     Specific Gravity 1.028 <1.035    Ph 5.0 5.0 - 8.0    Glucose Negative Negative mg/dL    Ketones Trace (A) Negative mg/dL    Protein 30 (A) Negative mg/dL    Bilirubin Small (A) Negative    Urobilinogen, Urine 1.0 Negative    Nitrite Negative Negative    Leukocyte Esterase Negative Negative    Occult Blood Negative Negative    Micro Urine Req Microscopic    URINE MICROSCOPIC (W/UA)    Collection Time: 10/01/19  2:22 PM   Result Value Ref Range    WBC 0-2 (A) /hpf    RBC 5-10 (A) /hpf    Bacteria Negative None /hpf    Epithelial Cells Negative /hpf    Hyaline Cast 6-10 (A) /lpf   EC-ECHOCARDIOGRAM LTD W/O CONT    Collection Time: 10/01/19  2:59 PM   Result Value Ref Range    Left Ventrical Ejection Fraction 75    ARTERIAL BLOOD GAS w/ O2 (LAB)    Collection Time: 10/01/19  3:17 PM   Result Value Ref Range    Ph 7.38 (L) 7.40 - 7.50    Pco2 33.5 26.0 - 37.0 mmHg    Po2 79.1 64.0 - 87.0 mmHg    O2 Saturation 94.6 93.0 - 99.0 %    Hco3 19 17 - 25 mmol/L    Base Excess -5 (L) -4 - 3 mmol/L    Body Temp 34.1 Centigrade    O2 Therapy 10.0 2.0 - 10.0 L/min    Ph -TC 7.42 7.40 - 7.50    Pco2 -TC 29.5 26.0 - 37.0 mmHg    Po2 -TC 65.3 64.0 - 87.0 mmHg       Imaging  CT-ABDOMEN-PELVIS WITH   Final Result      1.  Slightly nodular hepatic contour, suggestive of underlying hepatic fibrosis. No suspicious hepatic lesions.   2.  Small volume ascites.   3.  Mural thickening of multiple small bowel loops may be due to passive congestion related to portal enteropathy or due to acute enteritis. No bowel obstruction.      CT-CTA CHEST  PULMONARY ARTERY W/ RECONS   Final Result      1.  No pulmonary embolus. No acute abnormality in the chest.   2.  Mild atelectasis in the lower lobes.   3.  Slightly nodular hepatic contour, which may be due to underlying hepatic fibrosis.   4.  Partially visualized ascites.            DX-CHEST-LIMITED (1 VIEW)   Final Result      1.  Bibasilar atelectasis. No focal consolidation or pleural effusions.   2.  Borderline cardiomegaly.      EC-ECHOCARDIOGRAM LTD W/O CONT   Final Result      US-ABDOMEN COMPLETE SURVEY    (Results Pending)   [id number] VIEW    (Results Pending)       Assessment/Plan  * Acute on chronic respiratory failure with hypoxia and hypercapnia (HCC)- (present on admission)  Assessment & Plan  Will attempt noninvasive positive pressure ventilation and InVance to intubation mechanical ventilation if failure to improve respiratory status  RT/O2 Protocols  Titrate supplemental FiO2 to maintain SpO2 >88%  Aggressive diuresis    Later in the evening required intubation for continued respiratory failure and worsening metabolic acidosis  RT/O2 protocols  Daily and PRN ABGs  Titration of ventilator therapy based on ABGs and patient's status  Sedation as tolerated/indicated  Daily CXR  HOB >30 degrees and peridex for VAP prevention  Pepcid for GI prophylaxis  SAT/SBT when able (ABCDEF Bundle)  Early mobility    RVF (right ventricular failure) (HCC)- (present on admission)  Assessment & Plan  Likely due to pulmonary hypertension given LITTLE and OHS  CT for PE negative making CTEPH unlikely    Shock (HCC)  Assessment & Plan  Liz-intubation shock likely due to right ventricular failure  Vasopressors as needed to maintain MAP >65    High anion gap metabolic acidosis- (present on admission)  Assessment & Plan  Lactic acidosis  Continue resuscitation and follow    Cirrhosis (HCC)- (present on admission)  Assessment & Plan  CT scan concerning for hepatic fibrosis  Patient is a daily drinker also morbidly obese  concerning for nonalcoholic steatohepatitis or alcoholic cirrhosis  AST and ALT currently normal, bilirubin normal, INR normal  Limit hepatotoxins    Alcohol withdrawal syndrome with perceptual disturbance (HCC)- (present on admission)  Assessment & Plan  Start CIWA  Rally bag  Optimize electrolytes      Discussed patient condition and risk of morbidity and/or mortality with Hospitalist, Family, RN, RT, Pharmacy, Code status disscussed, Patient and ERP.      The patient remains critically ill.  Critical care time = 95 minutes in directly providing and coordinating critical care and extensive data review.  No time overlap and excludes procedures.

## 2019-10-02 NOTE — CARE PLAN
Problem: Ventilation Defect:  Goal: Ability to achieve and maintain unassisted ventilation or tolerate decreased levels of ventilator support  Outcome: NOT MET   Adult Ventilation Update    Total Vent Days: 1    Patient Lines/Drains/Airways Status      Active Airway       Name: Placement date: Placement time: Site: Days:    Airway ETT Oral 8.0  10/02/19   0005   Oral  less than 1                  CMV  30  480  14  70%

## 2019-10-02 NOTE — PROCEDURES
Central Line Insertion  Date/Time: 10/2/2019 12:57 AM  Performed by: Lencho Casillas Jr., D.O.  Authorized by: Lencho Casillas Jr., D.O.     Consent:     Consent obtained:  Emergent situation  Universal protocol:     Patient identity confirmed:  Arm band  Pre-procedure details:     Hand hygiene: Hand hygiene performed prior to insertion      Sterile barrier technique: All elements of maximal sterile technique followed      Skin preparation:  2% chlorhexidine    Skin preparation agent: Skin preparation agent completely dried prior to procedure    Sedation:     Sedation type:  Anxiolysis  Procedure details:     Location:  R internal jugular    Patient position:  Flat    Procedural supplies:  Triple lumen    Catheter size:  7 Fr    Landmarks identified: yes      Ultrasound guidance: yes      Sterile ultrasound techniques: Sterile gel and sterile probe covers were used      Number of attempts:  1    Successful placement: yes    Post-procedure details:     Post-procedure:  Dressing applied and line sutured    Assessment:  Blood return through all ports, no pneumothorax on x-ray, placement verified by x-ray and free fluid flow    Patient tolerance of procedure:  Tolerated well, no immediate complications

## 2019-10-02 NOTE — PROGRESS NOTES
Pt continues to be restless, unable to find comfortable position.  Fentanyl given with good but brief relief

## 2019-10-02 NOTE — PROGRESS NOTES
Monitor summary:  ST ==> SR    .16/.10/.40    Shift summary:  Pt admitted with respiratory distress, failed BiPAP, required intubation then vasopressor therapy.  Pt requires Fentanyl and Precedex to comply with ventilator.  Levophed and Vasopressin running to keep SBP 90.  Pt labile at times.  Urine output minimal following repeat dose of Lasix.

## 2019-10-02 NOTE — PROGRESS NOTES
Pt admitted to T634 from ER via monitored stretcher.  Pt tachypneic, dyspneic, restless.  BiPAP in use, pt continually pulling at mask and attempting to adjust it.  Monitor shows ST

## 2019-10-02 NOTE — DIETARY
"Nutrition Support Assessment:  Day 1 of admit.  Faisal Phillips is a 63 y.o. male with admitting DX of CHF exacerbation     Current problem list:  1. Acute on chronic respiratory failure with hypoxia  2. RVF  3. Shock  4. Alcohol withdrawal syndrome   5. Cirrhosis  6. Metabolic acidosis     Assessment:  Estimated Nutritional Needs based on:   Height: 185.4 cm (6' 0.99\")  Weight: (!) 143.2 kg (315 lb 11.2 oz) - bed scale  Weight to Use in Calculations: 143.2 kg (315 lb 11.2 oz)  Body mass index is 41.66 kg/m²., BMI classification: obesity class 3  IBW : 83.6 kg (184 lbs)    Calculation/Equation: MSJ x 1.0 = 2283 kcals, REE with PSU with 14.5 L/min and 37.1 Tmax = 2616 kcals (65-70% of kcals = 4483-6643 kcals)  Total Calories / day: 1600 - 2000  (Calories / k - 14  of ABW)  Total Grams Protein / day: 172 - 215  (Grams Protein / k.2 - 1.5, 2.1 - 2.5 gm/kg IBW)     Evaluation:   1. Pt previously on Bipap, required intubation last night for continued respiratory failure, nutrition support indicated.  2. Pt has gastric cortrak in place. Previously pt had OGT in place with 400 ml output. TF started with Fibersource running at 25 ml/hr.  3. During rounds, discussed pockets of ascites and edema. MD notes pt in \"cold shock\".  4. CT of abdomen/pelvis shows severe distention with ascites.  5. Per ER note, pt has not BM in 2-3 days.  6. Noted per ER note, pt normally weighs 225 lbs and is current at ~318 lbs at admit.   7. Per rounds, pt has hypoactive bowel sounds.  8. Labs: potassium 6.5, glucose 217, BUN 41, creatinine 1.71, phosphorus 6.7, ammonia 95  9. Meds: Albumin, pepcid, SSI, lactulose, Mag sulfate, MVI, folic acid, colace, thiamine, zofran PRN, bowel protocol, fentanyl, lasix, norepinephrine at 30 mcg/min, phenylephrine 50 mcg/min, propofol at 20 mcg/kg/min (17.2 ml/hr = 454 kcals/day)  10. Noted minimal urine output at rounds.  11. Specialized formula indicated for increased protein needs. Estimated needs " based on critical care guidelines.  12. Due to hypoactive bowel sounds, limited urine output, and pt on multiple vasopressors, recommend holding tubefeed at this time. Discussed with team during rounds.     Malnutrition Risk: Unable to assess at this time.     Recommendations/Plan:  1. Recommend tubefeed stay on hold at this time. Will reassess tomorrow.  As feasible, while on and off propofol, Start Peptamen Intense VHP @ 25 ml/hr and advance per protocol to 80 ml/hr to provide 1920 kcal + kcal from propofol (17 kcal/kg), 179 grams protein (2.2 gm/kg IBW), and 1613 ml free water per day.  Fluids per MD.   Metabolic cart ordered.    RD to follow.

## 2019-10-02 NOTE — ASSESSMENT & PLAN NOTE
Liz-intubation shock likely due to right ventricular failure  Vasopressors as needed to maintain MAP >65

## 2019-10-02 NOTE — PROCEDURES
Procedures  Procedure Note    Date: 10/2/2019  Time: 2:00    Procedure: Bronchoscopy    Indication: Respiratory failure, left lower lobe mucous plugging on chest x-ray    Consent: Informed consent unable to be obtained from patient as he is intubated and altered.    Procedure: A time-out was performed. Respiratory therapy and nursing at bedside throughout procedure. Patient provided sedation and analgesia throughout the procedure. Placed on full ventilator support with an FiO2 of 100% throughout the procedure. Using a fiberoptic bronchoscope, trachea entered via ET tube.  0 mL of local anesthetic sprayed at the alberto (2% lidocaine) achieving appropriate comfort level for patient. Airways visualized directly and the following intervention was performed: Suctioning of mucus and BAL. Findings as below. Patient tolerated procedure well without any difficulties and left in care of bedside nurse/RT.     Medications: Rocuronium, Fent, etomidate  Findings: Upper airway - Not visualized as bronchoscope passed through ETT.        Trachea to alberto -normal appearing mucosa without lesions or mass, ETT tip measured 3 cm from the alberto.        R proximal and distal airways -inflamed appearing mucosa without mass/lesion/anatomic variance, secretions: Scant, cloudy mucus in the right mainstem.  All bronchi sequentially lavaged until clear effluent returned.        L proximal and distal airways -inflamed appearing mucosa without mass/lesion/anatomic variance, secretions: Mild amounts of cloudy mucus in the left mainstem and left lower lobe.  BAL obtained from left lower lobe.  All bronchi sequentially lavaged until clear effluent returned        Samples -left lower lobe BAL    Complications: None apparent  CXR (if applicable): Pending    Lencho Casillas DO  Critical Care Medicine

## 2019-10-02 NOTE — HEART FAILURE PROGRAM
Cardiovascular Nurse Navigator () Advanced Heart Failure Program Inpatient Progress Note:     Please note th is an acutely decompensated right HF pt, Cardiology not following, nor has patient ever seen cardiology within our system.     Residence: [place]  Insurance coverage: Medicaid     As of 10/2/19, pt remains on ventilator, dispo undetermined. Therefore, scheduling of 7 day HF appt and patient education deferred at this time. He is also in ETOH withdrawal.     Will monitor for improvement in clinical status and evolving dispo plan.       Thank you and please call CONNIE Grayson with any questions: [phone].    HF Measures:  1. Documentation of LV systolic function (echo or cath) PTA, during this hospitalization, or plan to assess post discharge or reason for not assessing documented.  2. ACE-I, ARNI or ARB prescribed on discharge for LVEF <40%  3. For HF patients with LVEF less than or equal to 40% evidence based beta blocker must be prescribed upon discharge one of the following: carvedilol, bisoprolol, Toprol XL  4. For EF less than or equal to 35% aldosterone blockade prescribed upon discharge  5. Nutrition consult for diet education  6. HF education documented daily  7. Screening for and administering immunizations as long as no contraindications: Pneumonia and Influenza  8. Written discharge instructions include:  ? Daily weights  ? Record weight on tracker  ? Bring tracker to appointments  ? Call MD for weight gain of 3lb /day or 5lb/week  ? HF medication teaching  ? Low sodium diet  ? Follow up appointment within seven calendar days of d/c must include: date, time and location  ? Activity  ? Worsening symptoms  What if any of the above HF measures are contraindicated?  ? Request that the discharging provider document the medication/intervention and the contraindication specifically in a progress note  ? For example: “no CHF meds due to hypotension” is not enough. It needs to say: “No ACE-I, ARNI, ARB due to  hypotension”; “No Beta Blockade due to bradycardia”…

## 2019-10-02 NOTE — ASSESSMENT & PLAN NOTE
Avoid hepatotoxins  Intra-abdominal hypertension with bladder pressure of 30 on 10/3 - improved with paralysis and placement of NG to suction  Stop vecuronium and continue to monitor bladder pressure  Bedside ultrasound does not reveal enough ascites for safe paracentesis

## 2019-10-02 NOTE — PROGRESS NOTES
Cortrak Placement    Tube Team verified patient name and medical record number prior to tube placement.  Cortrak tube (43 inches, 10 Latvian) placed at 90 cm in right nare.  Per Cortrak picture, tube appears to be in the small bowel.  Nursing Instructions: Awaiting KUB to confirm placement before use for medications or feeding. Once placement confirmed, flush tube with 30 ml of water, and then remove and save stylet, in patient medication drawer.

## 2019-10-02 NOTE — CARE PLAN
Adult Ventilation Update    Total Vent Days: 1    CMV, 30, 480, +14, 80%.    Patient Lines/Drains/Airways Status      Active Airway       Name: Placement date: Placement time: Site: Days:    Airway ETT Oral 8.0  10/02/19   0005   Oral  less than 1                        Plateau Pressure (Q Shift): 23 (10/02/19 0220)  Static Compliance (ml / cm H2O): 56 (10/02/19 0432)    Patient failed trials because of Barriers to Wean: FiO2 >60% or PEEP >10 CM H2O (10/02/19 0432)  Barriers to SBT    Length of Weaning Trial      Sputum/Suction  Cough: Productive (10/02/19 0220)  Sputum Amount: Moderate (10/02/19 0220)  Sputum Color: White;Clear (10/02/19 0220)  Sputum Consistency: Thick (10/02/19 0220)    Mobility  Level of Mobility: Level IV (10/01/19 2000)  Activity Performed: Stand (10/01/19 2000)    Events/Summary/Plan: ABG done (10/02/19 0432)

## 2019-10-02 NOTE — DISCHARGE PLANNING
Care Transition Team Discharge Planning    Anticipated Discharge Disposition: TBD    Action: Lsw received [employer] search results for pt.    Lsw called Tamera Phillips, 1st degree possible relative, @ [phone]. Lsw left  requesting return call.    Lsw called Katya Zelaya, 1st degree relative, @ [phone]. Lsw left  requesting return call.     Bedside RN aware as above.    Barriers to Discharge: TBD    Plan: f/u w/ medical team

## 2019-10-02 NOTE — PROGRESS NOTES
Critical Care Progress Note    Date of admission  10/1/2019    Chief Complaint  63 y.o. male admitted 10/1/2019 with shortness of breath    Hospital Course    This gentleman was admitted to the ICU with respiratory failure, right heart failure, anasarca and alcohol abuse.      Interval Problem Update  Reviewed last 24 hour events:      0730 hours:    K high - give Kayexalate and follow up  Start Lasix gtt - did not respond well to IV lasix  In shock  NE 20  Vaso 0.03  Fent 50  Dex 0.8  Replete Mg  BISHNU  Increase SSI    1050 hours:    Prop 20  Fent 50  Will follow  SR-ST  NE 25  Vaso 0.03  Give albumin  TF at 25  Start milrinone at 0.5    1650 hours:    PA catheter placed - hemodynamics reviewed  Septic shock picture  Stop milrinone  Stop Lasix gtt  Bolus with 2 L of IV NS  Check lactic acid and cortisol level  Culture blood  UA negative  Stop Rocephin and Flagyl  Start Zosyn, Zyvox and Doxy empirically  NE 30  Andrea 300  Vaso 0.03  No urine output at this time    2100 hours:    Multiple serial reassessments  I reviewed the case with the significant other at the bedside  Significant EtOH abuse  Febrile to 101.3  Fent 150  NE 28    Andrea down to 100  Prop 45  Vaso 0.03      Review of Systems  Review of Systems   Unable to perform ROS: Acuity of condition        Vital Signs for last 24 hours   Temp:  [35.8 °C (96.5 °F)-38.5 °C (101.3 °F)] 38.2 °C (100.8 °F)  Pulse:  [] 104  Resp:  [22-38] 33  BP: ()/(33-97) 118/62  SpO2:  [67 %-100 %] 92 %    Hemodynamic parameters for last 24 hours  CVP:  [15 MM HG] 15 MM HG  PCWP:  [19 MM HG] 19 MM HG  CO:  [7.4] 7.4  CI:  [2.9] 2.9    Respiratory Information for the last 24 hours  [place] Vent Mode: APVCMV  Rate (breaths/min): 30  Vt Target (mL): 480  PEEP/CPAP: 14  FiO2: 70  P MEAN: 21  Control VTE (exp VT): 526    Physical Exam   Physical Exam   Constitutional:   On ventilator   HENT:   Head: Normocephalic and atraumatic.   Right Ear: External ear normal.   Left Ear:  External ear normal.   Nose: Nose normal.   Mouth/Throat: No oropharyngeal exudate.   Eyes: Pupils are equal, round, and reactive to light. Conjunctivae are normal. Right eye exhibits no discharge. Left eye exhibits no discharge.   Neck: Normal range of motion. Neck supple. No tracheal deviation present.   Cardiovascular: Intact distal pulses. Exam reveals no gallop.   Sinus rhythm   Pulmonary/Chest: He has no wheezes. He has rales (Few scattered crackles bilaterally).   Abdominal: He exhibits distension. There is no tenderness. There is no rebound.   Obese   Musculoskeletal: Normal range of motion. He exhibits edema (legs with significant edema).   No clubbing or cyanosis   Neurological:   Sedated.  Will arouse and follow.  Nods.  Moves all 4 extremities.   Skin: No rash noted. He is not diaphoretic. No erythema.       Medications  Current Facility-Administered Medications   Medication Dose Route Frequency Provider Last Rate Last Dose   • Respiratory Care per Protocol   Nebulization Continuous RT Lencho Casillas Jr. D.O.       • famotidine (PEPCID) tablet 20 mg  20 mg Enteral Tube Q12HRS FLORIAN Aguilar Jr..O.        Or   • famotidine (PEPCID) injection 20 mg  20 mg Intravenous Q12HRS Lencho Casillas Jr. D.O.   20 mg at 10/02/19 1733   • senna-docusate (PERICOLACE or SENOKOT S) 8.6-50 MG per tablet 2 Tab  2 Tab Enteral Tube BID Lencho Casillas Jr. D.O.   2 Tab at 10/02/19 1733    And   • polyethylene glycol/lytes (MIRALAX) PACKET 1 Packet  1 Packet Enteral Tube QDAY PRN FLORIAN Aguilar Jr..O.        And   • magnesium hydroxide (MILK OF MAGNESIA) suspension 30 mL  30 mL Enteral Tube QDAY PRN FLORIAN Aguilar Jr..O.        And   • bisacodyl (DULCOLAX) suppository 10 mg  10 mg Rectal QDAY PRN FLORIAN Aguilar Jr..O.       • MD Alert...ICU Electrolyte Replacement per Pharmacy   Other PHARMACY TO DOSE FLORIAN Aguilar Jr..O.       • lidocaine (XYLOCAINE) 1 % injection 1-2 mL  1-2 mL Tracheal Tube Q30 MIN  PRN Lencho Casillas Jr., D.O.       • vasopressin (VASOSTRICT) 20 Units in  mL Infusion  0.03 Units/min Intravenous Continuous Lencho Casillas Jr., D.O. 9 mL/hr at 10/02/19 1440 0.03 Units/min at 10/02/19 1440   • ipratropium-albuterol (DUONEB) nebulizer solution  3 mL Nebulization Q2HRS PRN (RT) Mike Arndt M.D.       • fentaNYL (SUBLIMAZE) injection 25 mcg  25 mcg Intravenous Q HOUR PRN Mike Arndt M.D.        Or   • fentaNYL (SUBLIMAZE) injection 50 mcg  50 mcg Intravenous Q HOUR PRN Mike Arndt M.D.        Or   • fentaNYL (SUBLIMAZE) injection 100 mcg  100 mcg Intravenous Q HOUR PRN Mike Arndt M.D.   100 mcg at 10/02/19 1538   • fentaNYL (SUBLIMAZE) 50 mcg/mL in 50mL   Intravenous Continuous Mike Arndt M.D. 4 mL/hr at 10/02/19 2103 200 mcg/hr at 10/02/19 2103   • ipratropium-albuterol (DUONEB) nebulizer solution  3 mL Nebulization Q4HRS (RT) Mike Arndt M.D.   3 mL at 10/02/19 1906   • Pharmacy Consult: Enteral tube insertion - review meds/change route/product selection  1 Each Other PHARMACY TO DOSE Mike Arndt M.D.       • insulin regular (HUMULIN R) injection 2-9 Units  2-9 Units Subcutaneous Q6HRS Mike Arndt M.D.   2 Units at 10/02/19 1840    And   • DEXTROSE 10% BOLUS 250 mL  250 mL Intravenous Q15 MIN PRN Mike Arndt M.D.       • thiamine (B-1) 500 mg in D5W 100 mL IVPB  500 mg Intravenous DAILY Mike Arndt M.D. 200 mL/hr at 10/02/19 1001 500 mg at 10/02/19 1001   • propofol (DIPRIVAN) injection  0-80 mcg/kg/min Intravenous Continuous Mike Arndt M.D. 38.7 mL/hr at 10/02/19 1922 45 mcg/kg/min at 10/02/19 1922   • promethazine (PHENERGAN) tablet 12.5-25 mg  12.5-25 mg Enteral Tube Q4HRS PRN Mike Arndt M.D.       • ondansetron (ZOFRAN ODT) dispertab 4 mg  4 mg Enteral Tube Q4HRS PRN Mike Arndt M.D.       • lactulose 20 GM/30ML solution 30 mL  30 mL Enteral  Tube Q EVENING Mike Arndt M.D.   30 mL at 10/02/19 1733   • [START ON 10/3/2019] folic acid (FOLVITE) tablet 1 mg  1 mg Enteral Tube DAILY Mike Arndt M.D.        And   • [START ON 10/3/2019] multivitamin (THERAGRAN) tablet 1 Tab  1 Tab Enteral Tube DAILY Mike Arndt M.D.       • cloNIDine (CATAPRES) tablet 0.1 mg  0.1 mg Enteral Tube Q HOUR PRN Mike Arndt M.D.       • albumin human 25% solution 25 g  25 g Intravenous Q6HRS Mike Arndt M.D. 150 mL/hr at 10/02/19 1733 25 g at 10/02/19 1733   • [START ON 10/3/2019] heparin injection 5,000 Units  5,000 Units Subcutaneous Q8HRS Mike Arndt M.D.       • norepinephrine (LEVOPHED) 32 mg in  mL Infusion  0-30 mcg/min Intravenous Continuous Mike Arndt M.D. 26.3 mL/hr at 10/02/19 2028 28 mcg/min at 10/02/19 2028   • phenylephrine (DAMON-SYNEPHRINE) 160 mg in  mL Infusion  0-300 mcg/min Intravenous Continuous Mike Arndt M.D. 18.8 mL/hr at 10/02/19 1905 100 mcg/min at 10/02/19 1905   • piperacillin-tazobactam (ZOSYN) 4.5 g in  mL IVPB  4.5 g Intravenous Q8HRS Mike Arndt M.D.       • Linezolid (ZYVOX) premix 600 mg  600 mg Intravenous Q12HRS Mike Arndt M.D.   Stopped at 10/02/19 1914   • doxycycline (VIBRAMYCIN) 100 mg in  mL IVPB  100 mg Intravenous Q12HRS Mike Arndt M.D.   Stopped at 10/02/19 2018   • NS infusion   Intravenous Continuous Mike Arndt M.D. 125 mL/hr at 10/02/19 1727     • labetalol (NORMODYNE,TRANDATE) injection 10 mg  10 mg Intravenous Q4HRS PRJACE Diego M.D.       • ondansetron (ZOFRAN) syringe/vial injection 4 mg  4 mg Intravenous Q4HRS PRJACE Diego M.D.   4 mg at 10/01/19 2020   • promethazine (PHENERGAN) suppository 12.5-25 mg  12.5-25 mg Rectal Q4HRS PRJACE Diego M.D.       • prochlorperazine (COMPAZINE) injection 5-10 mg  5-10 mg Intravenous Q4HRS PRJACE Diego M.D.            Fluids    Intake/Output Summary (Last 24 hours) at 10/2/2019 2109  Last data filed at 10/2/2019 2028  Gross per 24 hour   Intake 7585.92 ml   Output 400 ml   Net 7185.92 ml       Laboratory  Recent Labs     10/01/19  1517 10/01/19  2354 10/02/19  0106 10/02/19  0434   [id number] 7.38*  --   --   --    [id number] 33.5  --   --   --    [id number] 79.1  --   --   --    ZXQB0QTH 94.6  --   --   --    ARTHCO3 19  --   --   --    T3QETTTMG 10.0  --   --   --    ARTBE -5*  --   --   --    ISTATAPH  --  7.168* 7.261* 7.338*   ISTATAPCO2  --  57.4* 43.7* 38.7*   ISTATAPO2  --  60* 110* 75   ISTATATCO2  --  23 21 22   DXOGYRL8YOI  --  82* 97 94   ISTATARTHCO3  --  20.8 19.6 20.7   ISTATARTBE  --  -9* -7* -5*   ISTATTEMP  --  96.2 F 96.2 F 98.8 F   ISTATFIO2  --  40 100 80   ISTATSPEC  --  Arterial Arterial Arterial   ISTATAPHTC  --  7.186* 7.279* 7.336*   YTKXIYZH3KG  --  54* 102* 75         Recent Labs     10/01/19  1346 10/02/19  0430 10/02/19  1356   SODIUM 140 136 139   POTASSIUM 3.6 6.5* 4.8   CHLORIDE 102 104 105   CO2 21 22 22   BUN 30* 41* 50*   CREATININE 0.94 1.71* 2.29*   MAGNESIUM 1.7 1.5  --    PHOSPHORUS 5.7* 6.7*  --    CALCIUM 8.5 7.0* 7.1*     Recent Labs     10/01/19  1346 10/02/19  0430 10/02/19  1356   ALTSGPT 25 14  --    ASTSGOT 17 12  --    ALKPHOSPHAT 125* 90  --    TBILIRUBIN 1.3 1.6*  --    GLUCOSE 164* 217* 148*     Recent Labs     10/01/19  1346 10/02/19  0430   WBC 14.9* 23.3*   NEUTSPOLYS 87.20* 78.80*   LYMPHOCYTES 5.40* 3.40*   MONOCYTES 6.10 5.90   EOSINOPHILS 0.00 0.00   BASOPHILS 0.30 0.00   ASTSGOT 17 12   ALTSGPT 25 14   ALKPHOSPHAT 125* 90   TBILIRUBIN 1.3 1.6*     Recent Labs     10/01/19  1346 10/02/19  0430   RBC 5.31 5.03   HEMOGLOBIN 18.0 17.1   HEMATOCRIT 55.8* 53.6*   PLATELETCT 196 135*   PROTHROMBTM 14.4  --    INR 1.09  --        Imaging  X-Ray:  I have personally reviewed the images and compared with prior images. and My impression is: Left lower lobe > right lower lobe  opacification    Assessment/Plan  * Acute respiratory failure with hypoxia and hypercapnia (HCC)- (present on admission)  Assessment & Plan  Intubated 10/1  All the appropriate ventilator bundles are in place  Full vent support  He is too critically ill for SBT    Pneumonia  Assessment & Plan  Influenza screen negative  Nasal MRSA PCR negative  Follow-up BAL cultures  Stop Rocephin and metronidazole - escalate antimicrobial chemotherapy due to severity of illness  Start Zosyn, Zyvox and doxycycline for now    Septic shock (HCC)  Assessment & Plan  This is Septic shock Present on admission  SIRS criteria identified on my evaluation include: Fever, with temperature greater than 101 deg F, Tachycardia, with heart rate greater than 90 BPM, Tachypnea, with respirations greater than 20 per minute and Leukocyosis, with WBC greater than 12,000  Source is suspect pulmonary  Presentation includes: Severe sepsis present and persistent hypotension after 30 ml/kg completed.   Despite appropriate fluid resuscitation with crystalloid given per sepsis guidelines, the patient remains hypotensive with systolic blood pressure less than 90 or MAP less than 65  Hemodynamic support with additional fluids and IV vasopressors as needed to maintain a SBP of 90 or MAP of 65  IV antibiotics as appropriate for source of sepsis  Reassessment: I have reassessed the patient's hemodynamic status  Continue IV fluid resuscitation  I am titrating phenylephrine, norepinephrine and vasopressin to keep mean arterial pressure greater than 65  Continue empiric source directed antimicrobial chemotherapy    Alcohol abuse  Assessment & Plan  Supplement vitamins  Cessation counseling when clinically appropriate    Acute kidney injury (HCC)  Assessment & Plan  Hydrate with intravenous fluids  Monitor renal function and urine output  Renal dose medications and avoid nephrotoxins    Cirrhosis (HCC)- (present on admission)  Assessment & Plan  Avoid  hepatotoxins    Pulmonary hypertension (HCC)- (present on admission)  Assessment & Plan  History of LITTLE       VTE:  Lovenox  Ulcer: H2 Antagonist  Lines: Central Line  Ongoing indication addressed, Arterial Line  Ongoing indication addressed and Sheikh Catheter  Ongoing indication addressed    I have performed a physical exam and reviewed and updated ROS and Plan today (10/2/2019). In review of yesterday's note (10/1/2019), there are no changes except as documented above.     I have assessed and reassessed his respiratory status with ventilator adjustments, airway mechanics, ventilator waveforms, blood pressure, hemodynamics, cardiovascular status with titration of phenylephrine, norepinephrine and vasopressin.  I have assessed and reassessed his neurologic status with titration of dexmedetomidine, propofol and fentanyl.  He is at high risk for worsening respiratory, cardiovascular and renal system dysfunction.    Discussed patient condition and risk of morbidity and/or mortality with RN, RT, Pharmacy, Charge nurse / hot rounds and QA team     The patient remains critically ill.  Critical care time = 215 minutes in directly providing and coordinating critical care and extensive data review.  No time overlap and excludes procedures.    Mike Arndt MD  Pulmonary and Critical Care Medicine

## 2019-10-02 NOTE — H&P
Hospital Medicine History & Physical Note    Date of Service  10/1/2019    Primary Care Physician  Shiva Pedroza M.D.    Consultants  ICU    Code Status  Full    Chief Complaint  Shortness of breath     History of Presenting Illness  63 y.o. male who presented 10/1/2019 with past medical history of sleep apnea, hypertension,  with history of asbestos exposure, history of alcohol abuse comes into the emergency room with complaints of shortness of breath.  The patient states that he had any shortness of breath for the past 1 year.  He states that he has been having shortness of breath that is worse for the past few days.  Associated with cough, runny nose, headaches and congestion.  On arrival to emergency room he was hypertensive and tachycardic.  Patient was desaturating on room air.  CT scan of the abdomen and chest found evidence of liver fibrosis,and bilateral atelectasis. Negative for PE  Chest x-ray interpreted by me found bilateral atelectasis. Increase intravascular congestion   EKG interpreted by me found sinus tachycardia, T wave flattening and left atrial enlargement    Review of Systems  Review of Systems   Constitutional: Positive for chills and fever. Negative for diaphoresis and malaise/fatigue.   HENT: Positive for congestion and sinus pain. Negative for ear discharge, ear pain, hearing loss, nosebleeds, sore throat and tinnitus.    Eyes: Negative for blurred vision, double vision, photophobia and pain.   Respiratory: Positive for cough, sputum production and shortness of breath. Negative for hemoptysis, wheezing and stridor.    Cardiovascular: Negative for chest pain, palpitations, orthopnea, claudication, leg swelling and PND.   Gastrointestinal: Negative for abdominal pain, blood in stool, constipation, diarrhea, heartburn, melena, nausea and vomiting.   Genitourinary: Negative for dysuria, flank pain, frequency, hematuria and urgency.   Musculoskeletal: Negative for back  pain, falls, joint pain, myalgias and neck pain.   Skin: Negative for itching and rash.   Neurological: Negative for dizziness, tingling, tremors, weakness and headaches.   Endo/Heme/Allergies: Negative for environmental allergies and polydipsia. Does not bruise/bleed easily.   Psychiatric/Behavioral: Negative for depression, hallucinations, substance abuse and suicidal ideas.       Past Medical History   has a past medical history of Obesity.    Surgical History   has a past surgical history that includes gastrostomy and arthroscopy, knee.     Family History  family history includes Cancer in his mother.     Social History   reports that he has never smoked. He quit smokeless tobacco use about 3 years ago.  His smokeless tobacco use included chew. He reports that he drinks about 2.4 - 4.8 oz of alcohol per week. He reports that he does not use drugs.    Allergies  No Known Allergies    Medications  Prior to Admission Medications   Prescriptions Last Dose Informant Patient Reported? Taking?   lisinopril (PRINIVIL) 20 MG Tab 10/1/2019 at AM Patient Yes No   Sig: Take 20 mg by mouth every morning.      Facility-Administered Medications: None       Physical Exam  Temp:  [36.5 °C (97.7 °F)] 36.5 °C (97.7 °F)  Pulse:  [] 92  Resp:  [24-28] 26  BP: (106-179)/() 125/83  SpO2:  [91 %-99 %] 92 %    Physical Exam   Constitutional: He is oriented to person, place, and time. He appears well-developed and well-nourished.   Morbidly obese   HENT:   Head: Normocephalic and atraumatic.   Mouth/Throat: No oropharyngeal exudate.   Eyes: Conjunctivae are normal. No scleral icterus.   Neck: Normal range of motion. Neck supple. JVD present. No tracheal deviation present. No thyromegaly present.   Cardiovascular: Normal rate, regular rhythm and intact distal pulses. Exam reveals no gallop and no friction rub.   No murmur heard.  tachycardia   Pulmonary/Chest: Effort normal. No stridor. No respiratory distress. He has no  wheezes. He has rales. He exhibits no tenderness.   Abdominal: Soft. Bowel sounds are normal. He exhibits distension. He exhibits no mass. There is no tenderness. There is no rebound and no guarding.   Musculoskeletal: Normal range of motion. He exhibits edema.   Lymphadenopathy:     He has no cervical adenopathy.   Neurological: He is alert and oriented to person, place, and time. He has normal reflexes.   Nursing note and vitals reviewed.      Laboratory:  Recent Labs     10/01/19  1346   WBC 14.9*   RBC 5.31   HEMOGLOBIN 18.0   HEMATOCRIT 55.8*   .1*   MCH 33.9*   MCHC 32.3*   RDW 63.7*   PLATELETCT 196   MPV 9.6     Recent Labs     10/01/19  1346   SODIUM 140   POTASSIUM 3.6   CHLORIDE 102   CO2 21   GLUCOSE 164*   BUN 30*   CREATININE 0.94   CALCIUM 8.5     Recent Labs     10/01/19  1346   ALTSGPT 25   ASTSGOT 17   ALKPHOSPHAT 125*   TBILIRUBIN 1.3   GLUCOSE 164*     Recent Labs     10/01/19  1346   INR 1.09     Recent Labs     10/01/19  1346   NTPROBNP 866*         Recent Labs     10/01/19  1346   TROPONINT 15       Urinalysis:    Recent Labs     10/01/19  1422   SPECGRAVITY 1.028   GLUCOSEUR Negative   KETONES Trace*   NITRITE Negative   LEUKESTERAS Negative   WBCURINE 0-2*   RBCURINE 5-10*   BACTERIA Negative   EPITHELCELL Negative        Imaging:  CT-ABDOMEN-PELVIS WITH   Final Result      1.  Slightly nodular hepatic contour, suggestive of underlying hepatic fibrosis. No suspicious hepatic lesions.   2.  Small volume ascites.   3.  Mural thickening of multiple small bowel loops may be due to passive congestion related to portal enteropathy or due to acute enteritis. No bowel obstruction.      CT-CTA CHEST PULMONARY ARTERY W/ RECONS   Final Result      1.  No pulmonary embolus. No acute abnormality in the chest.   2.  Mild atelectasis in the lower lobes.   3.  Slightly nodular hepatic contour, which may be due to underlying hepatic fibrosis.   4.  Partially visualized ascites.             DX-CHEST-LIMITED (1 VIEW)   Final Result      1.  Bibasilar atelectasis. No focal consolidation or pleural effusions.   2.  Borderline cardiomegaly.      EC-ECHOCARDIOGRAM LTD W/O CONT   Final Result      US-ABDOMEN COMPLETE SURVEY    (Results Pending)         Assessment/Plan:  I anticipate this patient will require at least two midnights for appropriate medical management, necessitating inpatient admission.    * Acute respiratory failure with hypoxia and hypercapnia (HCC)  Assessment & Plan  Secondary to obesity hypoventilation syndrome  CT scan found bilateral atelectasis   Placed on BiPAP in the ED  Repeat ABG and adjust settings of BiPAP   Further management per critical care    RVF (right ventricular failure) (HCC)  Assessment & Plan  Secondary to obesity hypoventilation syndrome and cirrhosis  Volume control with IV lasix    High anion gap metabolic acidosis  Assessment & Plan  Follow-up lactic acid levels    Alcoholic cirrhosis of liver without ascites (HCC)  Assessment & Plan  MELD 8  Volume control with IV lasix  Check ammonia levels  Lactulose is ordered    Alcohol withdrawal syndrome with perceptual disturbance (HCC)  Assessment & Plan  Patient will be admitted to the ICU with close cardiac monitoring on CIWA protocol  Started on rally bag, multivitamin, thiamine and folate  Replete electrolytes  Patient has been counseled on alcohol cessation and will be provided with information for outpatient detox facilities        VTE prophylaxis: Lovenox

## 2019-10-02 NOTE — ASSESSMENT & PLAN NOTE
Likely due to pulmonary hypertension given LITTLE and OHS  CT for PE negative making CTEPH unlikely

## 2019-10-03 NOTE — CARE PLAN
Vent day 2. Desaturation noted x1. Patient placed on APRV by MD. FiO2 100%. Continuious Flolan 12ml/hr. Duoneb Q4.

## 2019-10-03 NOTE — PROGRESS NOTES
Dr Arndt at bedside to update friend.  Attempting to wedge swan, unable to wedge.  Dr Arndt advanced catheter to approx 67cm, swan now wedges.

## 2019-10-03 NOTE — PROCEDURES
Date of service:  10/3/2019    Title:  Arterial catheter placement    Indication: Septic shock.  Needs arterial catheter for continuous blood pressure monitoring.    Narrative:    The left upper arm was prepped with chlorhexidine and draped in the usual sterile fashion.  A 18 gauge arterial catheter was placed into the left axillary artery using the technique described by Branden without difficulty or apparent complication.  The line was sutured into place and a sterile dressing was placed over the line.  An outstanding arterial waveform is present on the monitor.  The patient tolerated the procedure quite nicely.  No complications were apparent.      Mike Arndt MD  Pulmonary and Critical Care Medicine

## 2019-10-03 NOTE — DISCHARGE PLANNING
Care Transition Team Discharge Planning    Anticipated Discharge Disposition: TBD    Action: Lsw called pt's medical POA, friend Kriss Stinson @ [phone]. Lsw introduced self, agency and provided contact information. Lsw requested to discuss future d/c planning. Lsw explained this assessment is done early in the admission process, and pt is not d/cing.    Barriers to Discharge: TBd    Plan: f/u w/ medical team, pt's POA

## 2019-10-03 NOTE — PROCEDURES
Date of service:  10/2/2019    Title:  Pulmonary artery catheter placement - subclavian vein    Indication:  Shock.  Evidence of pulmonary hypertension and RV failure.  It is medically necessary to place a PA catheter to measure cardiac hemodynamics.    Narrative:    The left chest was prepped with chlorhexidine and draped in the usual sterile fashion.  1% Xylocaine solution was used for topical anesthesia.  A double lumen pulmonary artery catheter introducer sheath was placed into the left subclavian vein using the technique described by Branden without difficulty or apparent complication.  The line was sutured into place.  Following this a Groves-Omar pulmonary artery catheter was floated into the pulmonary artery wedge position without difficulty or apparent complication.  Please refer to the nursing documentation for all hemodynamic measurements.  The patient tolerated the procedure quite nicely.  No complications were apparent.  A STAT chest x-ray is ordered to confirm appropriate position.      Mike Arndt MD  Pulmonary and Critical Care Medicine

## 2019-10-03 NOTE — PROGRESS NOTES
Critical care progress note:  This is a 63-year-old male that has been admitted for respiratory failure, and right heart failure, anasarca and alcohol abuse.  I was called due to worsening respiratory failure.  He remains on multiple pressors.  He also has significant abdominal distention.  A lot of this is secondary to edema.  On CT imaging he has minimal ascites but there is some areas that may be loculated.  Abdominal pressures have been 18-24.  I adjusted ventilator to reduce volume and pressure on abdomen, abg worsened from 7.3 to 7.26 and abdominal pressures no significant change. They have remained 20-24.  Continued elevated pulmonary pressures.  Started flolan.  Patient is significantly hypoxic on 14 peep.  Plateau pressures remain mid 20s.  Peak pressures 30s with occasional over 40. On fentanyl and propofol, I increased sedation.  Multiple reassessments.  He is being treated for pneumonia and septic shock.  He has hx of etoh abuse and likely has withdrawal component.  Pulmonary htn at baseline likely worsened.  LITTLE likely contributory.      Reviewed chart, imaging, labs and notes/orders    Assessment and plan:  Respiratory failure with hypoxia  Edema  Pulmonary edema  Abdominal compartment syndrome  Pneumonia  Septic shock    Patient is critically ill. He is on a mechanical ventilator.  On multiple pressors. ON propofol and fentanyl.  Started on flolan.  Likely abdominal compartment syndrome. If untreated there is a high chance of deterioration and eventually death. The critical that has been undertaken is medically complex. There has been no overlap in critical care time. Critical Care Time not including procedures: 44 minutes

## 2019-10-03 NOTE — PROCEDURES
Date of service:  10/3/2019    Title:  Hemodialysis central venous catheter placement - internal jugular vein    Indication:  Acute kidney injury.  Requires dialysis catheter for renal replacement therapy.    Narrative:    The right neck was prepped with chlorhexidine and draped in the usual sterile fashion.  1% Xylocaine solution was used for topical anesthesia.  A hemodialysis central venous catheter was placed into the right internal jugular vein under ultrasound guidance using the technique described by Branden without difficulty or apparent complication.  The line was sutured into place and a sterile dressing was placed over the line.  All ports flush and return venous blood easily.  The patient tolerated the procedure quite nicely.  No complications are apparent.  A STAT CXR is ordered to confirm placement.      Mike Arndt MD  Pulmonary and Critical Care Medicine

## 2019-10-03 NOTE — DISCHARGE PLANNING
Care Transition Team Discharge Planning    Anticipated Discharge Disposition: TBD    Action: Received VM from pt's dtr, Tamera, @ 519PM. She states this was the first chance she had to return the hospital's call. She provided contact number [phone].    Lsw attempted to update RN but phone is busy.    Barriers to Discharge: TBD    Plan: f/u w/ bedside RN to update as above.

## 2019-10-03 NOTE — ASSESSMENT & PLAN NOTE
I am titrating norepinephrine to keep mean arterial pressure greater than 65  Continue antibiotics

## 2019-10-03 NOTE — NON-PROVIDER
**MED STUDENT NOTE** NEPHROLOGY CONSULT      Medical Student: Debbie Roa, Student  Referred by: ROXY Mosher*  Reason for Consultation: BISHNU, worsening renal function, volume overload.    Patient: Faisal Phillips; [id number]; 1955    ID: 63 y.o. male admitted for Abdominal Pain and Shortness of Breath.    CC:   Chief Complaint   Patient presents with   • Abdominal Pain   • Shortness of Breath       HPI:  Faisal Phillips, a 63 year old male, presented to the ED c/o abdominal pain and worsening SOB admitted to the ICU with acute respiratory failure with hypoxia and hypercapnia requiring intubation complicated by septic shock, and found to have worsening renal function. Upon admission, his Cr was 0.94.  Upon consultation today, his Cr was 3.42.    ROS:  ROS Patient is intubated, sedated.     PMH:  Obesity, OHS  LITTLE  Pulmonary Hypertension  Right-sided Heart Failure  Liver Cirrhosis     Meds:  Prior to Admission medications    Medication Sig Start Date End Date Taking? Authorizing Provider   lisinopril (PRINIVIL) 20 MG Tab Take 20 mg by mouth every morning. 6/12/19   Physician Outpatient     Medications   labetalol (NORMODYNE,TRANDATE) injection 10 mg (has no administration in time range)   ondansetron (ZOFRAN) syringe/vial injection 4 mg (4 mg Intravenous Given 10/1/19 2020)   promethazine (PHENERGAN) suppository 12.5-25 mg (has no administration in time range)   prochlorperazine (COMPAZINE) injection 5-10 mg (has no administration in time range)   phenylephrine (DAMON-SYNEPHRINE) 100 mcg/mL inj (IV Push Syringe) 100 mcg (200 mcg Intravenous Given 10/2/19 0030)   Respiratory Care per Protocol (has no administration in time range)   famotidine (PEPCID) tablet 20 mg ( Enteral Tube See Alternative 10/3/19 0509)     Or   famotidine (PEPCID) injection 20 mg (20 mg Intravenous Given 10/3/19 0509)   lidocaine (XYLOCAINE) 1 % injection 1-2 mL (has no administration in time range)   norepinephrine (LEVOPHED) 8 mg in NS  250 mL Infusion (0 mcg/min Intravenous Stopped 10/2/19 1447)   vasopressin (VASOSTRICT) 20 Units in  mL Infusion (0.03 Units/min Intravenous New Bag 10/3/19 1411)   ipratropium-albuterol (DUONEB) nebulizer solution (has no administration in time range)   fentaNYL (SUBLIMAZE) injection 25 mcg ( Intravenous See Alternative 10/3/19 0312)     Or   fentaNYL (SUBLIMAZE) injection 50 mcg ( Intravenous See Alternative 10/3/19 0312)     Or   fentaNYL (SUBLIMAZE) injection 100 mcg (100 mcg Intravenous Given 10/3/19 0312)   ipratropium-albuterol (DUONEB) nebulizer solution (3 mL Nebulization Given 10/3/19 1413)   Pharmacy Consult: Enteral tube insertion - review meds/change route/product selection (has no administration in time range)   insulin regular (HUMULIN R) injection 2-9 Units (0 Units Subcutaneous Dose not Required 10/3/19 1200)     And   DEXTROSE 10% BOLUS 250 mL (has no administration in time range)   thiamine (B-1) 500 mg in D5W 100 mL IVPB (500 mg Intravenous New Bag 10/3/19 0501)   phenylephrine (DAMON-SYNEPHRINE) 40 mg in  mL Infusion (0 mcg/min Intravenous Stopped 10/2/19 1447)   propofol (DIPRIVAN) injection (30 mcg/kg/min × 143.2 kg Intravenous New Bag 10/3/19 1456)   promethazine (PHENERGAN) tablet 12.5-25 mg (has no administration in time range)   ondansetron (ZOFRAN ODT) dispertab 4 mg (has no administration in time range)   multivitamin (THERAGRAN) tablet 1 Tab (0 Tabs Enteral Tube Held 10/3/19 0600)     And   folic acid (FOLVITE) tablet 1 mg (0 mg Enteral Tube Held 10/3/19 0600)   cloNIDine (CATAPRES) tablet 0.1 mg (has no administration in time range)   albumin human 25% solution 25 g (25 g Intravenous New Bag 10/3/19 1122)   heparin injection 5,000 Units (5,000 Units Subcutaneous Given 10/3/19 1445)   piperacillin-tazobactam (ZOSYN) 4.5 g in  mL IVPB (0 g Intravenous Stopped 10/2/19 1803)     And   piperacillin-tazobactam (ZOSYN) 4.5 g in  mL IVPB (4.5 g Intravenous New Bag 10/3/19  1445)   Linezolid (ZYVOX) premix 600 mg (0 mg Intravenous Stopped 10/3/19 0600)   doxycycline (VIBRAMYCIN) 100 mg in  mL IVPB (0 mg Intravenous Stopped 10/3/19 0600)   epoprostenol (FLOLAN) 1.5 mg in glycine diluent for flolan 50 mL for Inhalation (0.042 mcg/kg/min × 143.2 kg (Order-Specific) Inhalation New Bag 10/3/19 1625)   lactulose 20 GM/30ML solution 30 mL (30 mL Enteral Tube Given 10/3/19 1445)   EPINEPHrine 16 mg in  mL Infusion (has no administration in time range)   norepinephrine (LEVOPHED) 16 mg in  mL Infusion (has no administration in time range)   phenylephrine (DAMON-SYNEPHRINE) 320 mg in  mL Infusion (has no administration in time range)   artificial tears (EYE LUBRICANT) ophth ointment 1 Application (has no administration in time range)   vecuronium (NORCURON) injection 14.32 mg (has no administration in time range)   vecuronium (NORCURON) 60 mg in D5W 500 mL Infusion (has no administration in time range)   vecuronium (NORCURON) injection 14.32 mg (has no administration in time range)   fentaNYL (SUBLIMAZE) 50 mcg/mL in 50mL (has no administration in time range)   furosemide (LASIX) injection 80 mg (80 mg Intravenous Given 10/1/19 1420)   iohexol (OMNIPAQUE) 350 mg/mL (100 mL Intravenous Given 10/1/19 1611)   iohexol (OMNIPAQUE) 350 mg/mL (100 mL Intravenous Given 10/1/19 1613)   furosemide (LASIX) injection 80 mg (80 mg Intravenous Given 10/1/19 2130)   PHENYLEPHRINE HCL 10 MG/ML INJ SOLN (S-ORDERABLE) (  See ADS/Cabinet Pull 10/2/19 0015)   PHENYLEPHRINE HCL-NACL 1-0.9 MG/10ML-% IV SOSY (  See ADS/Cabinet Pull 10/2/19 0015)   NOREPINEPHRINE BITARTRATE 1 MG/ML IV SOLN (8 mg  New Bag 10/2/19 0020)   SODIUM CHLORIDE 0.9 % IV SOLN (250 mL  New Bag 10/2/19 0020)   SODIUM CHLORIDE 0.9 % IV SOLN (250 mL  New Bag 10/2/19 0020)   etomidate (AMIDATE) injection 20 mg (20 mg Intravenous Given 10/2/19 0048)   rocuronium (ZEMURON) injection 100 mg (100 mg Intravenous Given 10/2/19 0007)    FENTANYL CITRATE (PF) 0.05 MG/ML INJ SOLN (  See ADS/Cabinet Pull 10/2/19 0045)   magnesium sulfate IVPB premix 4 g (0 g Intravenous Stopped 10/2/19 1328)   sodium polystyrene (KAYEXALATE) 15 GM/60ML suspension 30 g (30 g Enteral Tube Given 10/2/19 1014)   SODIUM CHLORIDE 0.9 % IV SOLN (250 mL  New Bag 10/2/19 1216)   SODIUM CHLORIDE 0.9 % IV SOLN (1,000 mL  New Bag 10/2/19 1518)   SODIUM CHLORIDE 0.9 % IV SOLN (  New Bag 10/2/19 1530)   PHENYLEPHRINE HCL-NACL 1-0.9 MG/10ML-% IV SOSY (  Return to ADS 10/2/19 1545)   NS infusion 2,397 mL (0 mL Intravenous Stopped 10/2/19 1938)   heparin injection 3,300 Units (3,300 Units Intracatheter Given 10/3/19 1215)     Administrations This Visit     albumin human 25% solution 25 g     Admin Date  10/02/2019 Action  New Bag Dose  25 g Rate  150 mL/hr Route  Intravenous Site             Admin Date  10/02/2019 Action  New Bag Dose  25 g Rate  150 mL/hr Route  Intravenous Site             Admin Date  10/02/2019 Action  New Bag Dose  25 g Rate  150 mL/hr Route  Intravenous Site             Admin Date  10/03/2019 Action  New Bag Dose  25 g Rate  150 mL/hr Route  Intravenous Site             Admin Date  10/03/2019 Action  New Bag Dose  25 g Rate  150 mL/hr Route  Intravenous Site            cefTRIAXone (ROCEPHIN) 2 g in  mL IVPB     Admin Date  10/02/2019 Action  New Bag Dose  2 g Rate  200 mL/hr Route  Intravenous Site            dexmedetomidine (PRECEDEX) 400 mcg/100mL NS premix infusion     Admin Date  10/02/2019 Action  New Bag Dose  1 mcg/kg/hr Rate  36.1 mL/hr Route  Intravenous Site             Admin Date  10/02/2019 Action  Rate Change Dose  0.8 mcg/kg/hr Rate  28.8 mL/hr Route  Intravenous Site             Admin Date  10/02/2019 Action  New Bag Dose  0.8 mcg/kg/hr Rate  28.8 mL/hr Route  Intravenous Site            doxycycline (VIBRAMYCIN) 100 mg in  mL IVPB     Admin Date  10/02/2019 Action  New Bag Dose  100 mg Rate  100 mL/hr Route  Intravenous Site              Admin Date  10/03/2019 Action  New Bag Dose  100 mg Rate  100 mL/hr Route  Intravenous Site            enoxaparin (LOVENOX) inj 40 mg     Admin Date  10/01/2019 Action  Given Dose  40 mg Route  Subcutaneous Site  Right Lower Quad Abdomen           Admin Date  10/02/2019 Action  Given Dose  40 mg Route  Subcutaneous Site  Left Lower Quad Abdomen          epoprostenol (FLOLAN) 1.5 mg in glycine diluent for flolan 50 mL for Inhalation     Admin Date  10/03/2019 Action  New Bag Dose  0.05 mcg/kg/min Rate  14.32 mL/hr Route  Inhalation Site             Admin Date  10/03/2019 Action  New Bag Dose  0.042 mcg/kg/min Rate  12 mL/hr Route  Inhalation Site             Admin Date  10/03/2019 Action  New Bag Dose  0.042 mcg/kg/min Rate  12 mL/hr Route  Inhalation Site             Admin Date  10/03/2019 Action  New Bag Dose  0.042 mcg/kg/min Rate  12 mL/hr Route  Inhalation Site            etomidate (AMIDATE) injection 20 mg     Admin Date  10/02/2019 Action  Given Dose  20 mg Route  Intravenous Site            famotidine (PEPCID) injection 20 mg     Admin Date  10/02/2019 Action  Given Dose  20 mg Route  Intravenous Site             Admin Date  10/02/2019 Action  Given Dose  20 mg Route  Intravenous Site             Admin Date  10/03/2019 Action  Given Dose  20 mg Route  Intravenous Site            fentaNYL (SUBLIMAZE) 50 mcg/mL in 50mL     Admin Date  10/02/2019 Action  Restarted Dose  100 mcg/hr Rate  2 mL/hr Route  Intravenous Site             Admin Date  10/02/2019 Action  New Bag Dose  150 mcg/hr Rate  3 mL/hr Route  Intravenous Site             Admin Date  10/02/2019 Action  Rate Change Dose  200 mcg/hr Rate  4 mL/hr Route  Intravenous Site             Admin Date  10/03/2019 Action  Rate Verify Dose  200 mcg/hr Rate  4 mL/hr Route  Intravenous Site             Admin Date  10/03/2019 Action  Restarted Dose  100 mcg/hr Rate  2 mL/hr Route  Intravenous Site             Admin Date  10/03/2019 Action  New Bag Dose  100  mcg/hr Rate  2 mL/hr Route  Intravenous Site            fentaNYL (SUBLIMAZE) 50 mcg/mL in 50mL (Continuous Infusion)     Admin Date  10/02/2019 Action  New Bag Dose  50 mcg/hr Rate  1 mL/hr Route  Intravenous Site             Admin Date  10/02/2019 Action  Rate Verify Dose  50 mcg/hr Rate  1 mL/hr Route  Intravenous Site            fentaNYL (SUBLIMAZE) injection 100 mcg     Admin Date  10/01/2019 Action  Given Dose  100 mcg Route  Intravenous Site             Admin Date  10/01/2019 Action  Given Dose  100 mcg Route  Intravenous Site             Admin Date  10/02/2019 Action  Given Dose  100 mcg Route  Intravenous Site             Admin Date  10/02/2019 Action  Given Dose  100 mcg Route  Intravenous Site             Admin Date  10/02/2019 Action  Given Dose  100 mcg Route  Intravenous Site             Admin Date  10/02/2019 Action  Given Dose  100 mcg Route  Intravenous Site             Admin Date  10/03/2019 Action  Given Dose  100 mcg Route  Intravenous Site            folic acid (FOLVITE) tablet 1 mg     Admin Date  10/01/2019 Action  Given Dose  1 mg Route  Oral Site             Admin Date  10/02/2019 Action  Given Dose  1 mg Route  Oral Site            furosemide (LASIX) 100 mg in  mL infusion     Admin Date  10/02/2019 Action  New Bag Dose  5 mg/hr Rate  5 mL/hr Route  Intravenous Site            furosemide (LASIX) injection 40 mg     Admin Date  10/02/2019 Action  Given Dose  40 mg Route  Intravenous Site            furosemide (LASIX) injection 80 mg     Admin Date  10/01/2019 Action  Given Dose  80 mg Route  Intravenous Site             Admin Date  10/01/2019 Action  Given Dose  80 mg Route  Intravenous Site            heparin injection 3,300 Units     Admin Date  10/03/2019 Action  Given Dose  3300 Units Route  Intracatheter Site            heparin injection 5,000 Units     Admin Date  10/03/2019 Action  Given Dose  5000 Units Route  Subcutaneous Site  Left Back Arm           Admin Date  10/03/2019  Action  Given Dose  5000 Units Route  Subcutaneous Site  Right Back Arm          insulin regular (HUMULIN R) injection 1-6 Units     Admin Date  10/02/2019 Action  Given Dose  2 Units Route  Subcutaneous Site  Left Back Arm          insulin regular (HUMULIN R) injection 2-9 Units     Admin Date  10/02/2019 Action  Given Dose  2 Units Route  Subcutaneous Site  Right Upper Arm           Admin Date  10/02/2019 Action  Given Dose  2 Units Route  Subcutaneous Site  Right Back Arm           Admin Date  10/02/2019 Action  Given Dose  2 Units Route  Subcutaneous Site  Right Back Arm           Admin Date  10/03/2019 Action  Given Dose  2 Units Route  Subcutaneous Site  Left Back Arm           Admin Date  10/03/2019 Action  Given Dose  2 Units Route  Subcutaneous Site  Right Upper Arm          iohexol (OMNIPAQUE) 350 mg/mL     Admin Date  10/01/2019 Action  Given Dose  100 mL Route  Intravenous Site             Admin Date  10/01/2019 Action  Given Dose  100 mL Route  Intravenous Site            ipratropium-albuterol (DUONEB) nebulizer solution     Admin Date  10/01/2019 Action  Given Dose  3 mL Route  Nebulization Site             Admin Date  10/02/2019 Action  Given Dose  3 mL Route  Nebulization Site             Admin Date  10/02/2019 Action  Given Dose  3 mL Route  Nebulization Site             Admin Date  10/02/2019 Action  Given Dose  3 mL Route  Nebulization Site             Admin Date  10/02/2019 Action  Given Dose  3 mL Route  Nebulization Site             Admin Date  10/02/2019 Action  Given Dose  3 mL Route  Nebulization Site             Admin Date  10/03/2019 Action  Given Dose  3 mL Route  Nebulization Site             Admin Date  10/03/2019 Action  Given Dose  3 mL Route  Nebulization Site             Admin Date  10/03/2019 Action  Given Dose  3 mL Route  Nebulization Site             Admin Date  10/03/2019 Action  Given Dose  3 mL Route  Nebulization Site            lactulose 20 GM/30ML solution 30 mL     Admin  Date  10/02/2019 Action  Given Dose  30 mL Route  Enteral Tube Site             Admin Date  10/03/2019 Action  Given Dose  30 mL Route  Enteral Tube Site             Admin Date  10/03/2019 Action  Given Dose  30 mL Route  Enteral Tube Site            Linezolid (ZYVOX) premix 600 mg     Admin Date  10/02/2019 Action  New Bag Dose  600 mg Rate  300 mL/hr Route  Intravenous Site             Admin Date  10/03/2019 Action  New Bag Dose  600 mg Rate  300 mL/hr Route  Intravenous Site            LORazepam (ATIVAN) injection 1 mg     Admin Date  10/01/2019 Action  Given Dose  1 mg Route  Intravenous Site            LORazepam (ATIVAN) injection 2 mg     Admin Date  10/01/2019 Action  Given Dose  2 mg Route  Intravenous Site            magnesium sulfate IVPB premix 4 g     Admin Date  10/02/2019 Action  New Bag Dose  4 g Rate  25 mL/hr Route  Intravenous Site            metroNIDAZOLE (FLAGYL) IVPB 500 mg     Admin Date  10/02/2019 Action  New Bag Dose  500 mg Rate  100 mL/hr Route  Intravenous Site             Admin Date  10/02/2019 Action  New Bag Dose  500 mg Rate  100 mL/hr Route  Intravenous Site             Admin Date  10/02/2019 Action  New Bag Dose  500 mg Rate  100 mL/hr Route  Intravenous Site            milrinone lactate (PRIMACOR) 40 mg in D5W 250 mL Infusion     Admin Date  10/02/2019 Action  New Bag Dose  0.375 mcg/kg/min Rate  20.1 mL/hr Route  Intravenous Site            multivitamin (THERAGRAN) tablet 1 Tab     Admin Date  10/01/2019 Action  Given Dose  1 Tab Route  Oral Site             Admin Date  10/02/2019 Action  Given Dose  1 Tab Route  Oral Site            norepinephrine (LEVOPHED) 32 mg in  mL Infusion     Admin Date  10/02/2019 Action  New Bag Dose  30 mcg/min Rate  28.1 mL/hr Route  Intravenous Site             Admin Date  10/02/2019 Action  Rate Change Dose  28 mcg/min Rate  26.3 mL/hr Route  Intravenous Site             Admin Date  10/02/2019 Action  Rate Change Dose  25 mcg/min Rate  23.4  mL/hr Route  Intravenous Site             Admin Date  10/02/2019 Action  Rate Change Dose  28 mcg/min Rate  26.3 mL/hr Route  Intravenous Site             Admin Date  10/02/2019 Action  Rate Change Dose  25 mcg/min Rate  23.4 mL/hr Route  Intravenous Site             Admin Date  10/02/2019 Action  Rate Change Dose  23 mcg/min Rate  21.6 mL/hr Route  Intravenous Site             Admin Date  10/02/2019 Action  Rate Change Dose  20 mcg/min Rate  18.8 mL/hr Route  Intravenous Site             Admin Date  10/03/2019 Action  Rate Change Dose  18 mcg/min Rate  16.9 mL/hr Route  Intravenous Site             Admin Date  10/03/2019 Action  Rate Change Dose  15 mcg/min Rate  14.1 mL/hr Route  Intravenous Site             Admin Date  10/03/2019 Action  Rate Change Dose  13 mcg/min Rate  12.2 mL/hr Route  Intravenous Site             Admin Date  10/03/2019 Action  Rate Change Dose  10 mcg/min Rate  9.4 mL/hr Route  Intravenous Site             Admin Date  10/03/2019 Action  Rate Change Dose  7 mcg/min Rate  6.6 mL/hr Route  Intravenous Site             Admin Date  10/03/2019 Action  Rate Change Dose  15 mcg/min Rate  14.1 mL/hr Route  Intravenous Site             Admin Date  10/03/2019 Action  Rate Change Dose  30 mcg/min Rate  28.1 mL/hr Route  Intravenous Site             Admin Date  10/03/2019 Action  Rate Change Dose  25 mcg/min Rate  23.4 mL/hr Route  Intravenous Site             Admin Date  10/03/2019 Action  Rate Change Dose  30 mcg/min Rate  28.1 mL/hr Route  Intravenous Site            norepinephrine (LEVOPHED) 8 mg in  mL Infusion     Admin Date  10/02/2019 Action  Rate Verify Dose  30 mcg/min Rate  56.3 mL/hr Route  Intravenous Site             Admin Date  10/02/2019 Action  New Bag Dose  30 mcg/min Rate  56.3 mL/hr Route  Intravenous Site             Admin Date  10/02/2019 Action  Rate Change Dose  25 mcg/min Rate  46.9 mL/hr Route  Intravenous Site             Admin Date  10/02/2019 Action  Rate Change  Dose  20 mcg/min Rate  37.5 mL/hr Route  Intravenous Site             Admin Date  10/02/2019 Action  Rate Change Dose  25 mcg/min Rate  46.9 mL/hr Route  Intravenous Site             Admin Date  10/02/2019 Action  Rate Change Dose  20 mcg/min Rate  37.5 mL/hr Route  Intravenous Site             Admin Date  10/02/2019 Action  Rate Change Dose  25 mcg/min Rate  46.9 mL/hr Route  Intravenous Site             Admin Date  10/02/2019 Action  Rate Change Dose  20 mcg/min Rate  37.5 mL/hr Route  Intravenous Site             Admin Date  10/02/2019 Action  Rate Change Dose  25 mcg/min Rate  46.9 mL/hr Route  Intravenous Site             Admin Date  10/02/2019 Action  Rate Change Dose  30 mcg/min Rate  56.3 mL/hr Route  Intravenous Site             Admin Date  10/02/2019 Action  Rate Change Dose  25 mcg/min Rate  46.9 mL/hr Route  Intravenous Site             Admin Date  10/02/2019 Action  New Bag Dose  25 mcg/min Rate  46.9 mL/hr Route  Intravenous Site             Admin Date  10/02/2019 Action  Rate Change Dose  30 mcg/min Rate  56.3 mL/hr Route  Intravenous Site             Admin Date  10/02/2019 Action  Rate Change Dose  25 mcg/min Rate  46.9 mL/hr Route  Intravenous Site             Admin Date  10/02/2019 Action  Rate Change Dose  30 mcg/min Rate  56.3 mL/hr Route  Intravenous Site            NOREPINEPHRINE BITARTRATE 1 MG/ML IV SOLN     Admin Date  10/02/2019 Action  New Bag Dose  8 mg Route   Site            NS infusion     Admin Date  10/02/2019 Action  New Bag Dose   Rate  125 mL/hr Route  Intravenous Site            NS infusion 2,397 mL     Admin Date  10/02/2019 Action  New Bag Dose  2397 mL Rate  1,000 mL/hr Route  Intravenous Site            ondansetron (ZOFRAN) syringe/vial injection 4 mg     Admin Date  10/01/2019 Action  Given Dose  4 mg Route  Intravenous Site            phenylephrine (DAMON-SYNEPHRINE) 100 mcg/mL inj (IV Push Syringe) 100 mcg     Admin Date  10/02/2019 Action  Given Dose  200 mcg  Route  Intravenous Site             Admin Date  10/02/2019 Action  Given Dose  200 mcg Route  Intravenous Site             Admin Date  10/02/2019 Action  Given Dose  200 mcg Route  Intravenous Site            phenylephrine (DAMON-SYNEPHRINE) 160 mg in  mL Infusion     Admin Date  10/03/2019 Action  New Bag Dose  300 mcg/min Rate  28.1 mL/hr Route  Intravenous Site            phenylephrine (DAMON-SYNEPHRINE) 160 mg in  mL Infusion     Admin Date  10/02/2019 Action  New Bag Dose  270 mcg/min Rate  50.6 mL/hr Route  Intravenous Site             Admin Date  10/02/2019 Action  Rate Change Dose  300 mcg/min Rate  56.3 mL/hr Route  Intravenous Site             Admin Date  10/02/2019 Action  Rate Change Dose  270 mcg/min Rate  50.6 mL/hr Route  Intravenous Site             Admin Date  10/02/2019 Action  Rate Change Dose  200 mcg/min Rate  37.5 mL/hr Route  Intravenous Site             Admin Date  10/02/2019 Action  Rate Change Dose  100 mcg/min Rate  18.8 mL/hr Route  Intravenous Site             Admin Date  10/02/2019 Action  Rate Change Dose  150 mcg/min Rate  28.1 mL/hr Route  Intravenous Site             Admin Date  10/02/2019 Action  Rate Change Dose  200 mcg/min Rate  37.5 mL/hr Route  Intravenous Site             Admin Date  10/02/2019 Action  Rate Change Dose  250 mcg/min Rate  46.9 mL/hr Route  Intravenous Site             Admin Date  10/03/2019 Action  Rate Change Dose  300 mcg/min Rate  56.3 mL/hr Route  Intravenous Site             Admin Date  10/03/2019 Action  New Bag Dose  300 mcg/min Rate  56.3 mL/hr Route  Intravenous Site             Admin Date  10/03/2019 Action  Rate Change Dose  275 mcg/min Rate  51.6 mL/hr Route  Intravenous Site             Admin Date  10/03/2019 Action  Rate Change Dose  250 mcg/min Rate  46.9 mL/hr Route  Intravenous Site             Admin Date  10/03/2019 Action  Rate Change Dose  270 mcg/min Rate  50.6 mL/hr Route  Intravenous Site             Admin Date  10/03/2019  Action  New Bag Dose  270 mcg/min Rate  50.6 mL/hr Route  Intravenous Site             Admin Date  10/03/2019 Action  Rate Change Dose  300 mcg/min Rate  56.3 mL/hr Route  Intravenous Site            phenylephrine (DAMON-SYNEPHRINE) 40 mg in  mL Infusion     Admin Date  10/02/2019 Action  New Bag Dose  50 mcg/min Rate  18.8 mL/hr Route  Intravenous Site             Admin Date  10/02/2019 Action  Rate Change Dose  20 mcg/min Rate  7.5 mL/hr Route  Intravenous Site             Admin Date  10/02/2019 Action  Rate Change Dose  50 mcg/min Rate  18.8 mL/hr Route  Intravenous Site             Admin Date  10/02/2019 Action  Rate Change Dose  100 mcg/min Rate  37.5 mL/hr Route  Intravenous Site             Admin Date  10/02/2019 Action  Rate Change Dose  120 mcg/min Rate  45 mL/hr Route  Intravenous Site             Admin Date  10/02/2019 Action  Rate Change Dose  150 mcg/min Rate  56.3 mL/hr Route  Intravenous Site             Admin Date  10/02/2019 Action  Rate Change Dose  200 mcg/min Rate  75 mL/hr Route  Intravenous Site             Admin Date  10/02/2019 Action  Rate Change Dose  220 mcg/min Rate  82.5 mL/hr Route  Intravenous Site             Admin Date  10/02/2019 Action  Rate Change Dose  250 mcg/min Rate  93.8 mL/hr Route  Intravenous Site             Admin Date  10/02/2019 Action  Rate Change Dose  270 mcg/min Rate  101.3 mL/hr Route  Intravenous Site            piperacillin-tazobactam (ZOSYN) 4.5 g in  mL IVPB     Admin Date  10/02/2019 Action  New Bag Dose  4.5 g Rate  200 mL/hr Route  Intravenous Site             Admin Date  10/02/2019 Action  New Bag Dose  4.5 g Rate  25 mL/hr Route  Intravenous Site             Admin Date  10/03/2019 Action  New Bag Dose  4.5 g Rate  25 mL/hr Route  Intravenous Site             Admin Date  10/03/2019 Action  New Bag Dose  4.5 g Rate  25 mL/hr Route  Intravenous Site            propofol (DIPRIVAN) injection     Admin Date  10/02/2019 Action  New Bag Dose  20  mcg/kg/min Rate  17.2 mL/hr Route  Intravenous Site             Admin Date  10/02/2019 Action  New Bag Dose  20 mcg/kg/min Rate  17.2 mL/hr Route  Intravenous Site             Admin Date  10/02/2019 Action  Rate Change Dose  30 mcg/kg/min Rate  25.8 mL/hr Route  Intravenous Site             Admin Date  10/02/2019 Action  Rate Change Dose  35 mcg/kg/min Rate  30.1 mL/hr Route  Intravenous Site             Admin Date  10/02/2019 Action  New Bag Dose  50 mcg/kg/min Rate  43 mL/hr Route  Intravenous Site             Admin Date  10/02/2019 Action  Rate Change Dose  80 mcg/kg/min Rate  68.7 mL/hr Route  Intravenous Site             Admin Date  10/02/2019 Action  Rate Change Dose  40 mcg/kg/min Rate  34.4 mL/hr Route  Intravenous Site             Admin Date  10/02/2019 Action  New Bag Dose  40 mcg/kg/min Rate  34.4 mL/hr Route  Intravenous Site             Admin Date  10/02/2019 Action  Rate Change Dose  45 mcg/kg/min Rate  38.7 mL/hr Route  Intravenous Site             Admin Date  10/02/2019 Action  New Bag Dose  45 mcg/kg/min Rate  38.7 mL/hr Route  Intravenous Site             Admin Date  10/02/2019 Action  Rate Change Dose  50 mcg/kg/min Rate  43 mL/hr Route  Intravenous Site             Admin Date  10/02/2019 Action  New Bag Dose  50 mcg/kg/min Rate  43 mL/hr Route  Intravenous Site             Admin Date  10/03/2019 Action  New Bag Dose  50 mcg/kg/min Rate  43 mL/hr Route  Intravenous Site             Admin Date  10/03/2019 Action  Rate Change Dose  55 mcg/kg/min Rate  47.3 mL/hr Route  Intravenous Site             Admin Date  10/03/2019 Action  New Bag Dose  55 mcg/kg/min Rate  47.3 mL/hr Route  Intravenous Site             Admin Date  10/03/2019 Action  Rate Change Dose  60 mcg/kg/min Rate  51.6 mL/hr Route  Intravenous Site             Admin Date  10/03/2019 Action  New Bag Dose  60 mcg/kg/min Rate  51.6 mL/hr Route  Intravenous Site             Admin Date  10/03/2019 Action  Restarted Dose  30 mcg/kg/min  Rate  25.8 mL/hr Route  Intravenous Site             Admin Date  10/03/2019 Action  New Bag Dose  30 mcg/kg/min Rate  25.8 mL/hr Route  Intravenous Site             Admin Date  10/03/2019 Action  New Bag Dose  30 mcg/kg/min Rate  25.8 mL/hr Route  Intravenous Site            rocuronium (ZEMURON) injection 100 mg     Admin Date  10/02/2019 Action  Given Dose  100 mg Route  Intravenous Site            senna-docusate (PERICOLACE or SENOKOT S) 8.6-50 MG per tablet 2 Tab     Admin Date  10/01/2019 Action  Given Dose  2 Tab Route  Oral Site             Admin Date  10/02/2019 Action  Given Dose  2 Tab Route  Enteral Tube Site             Admin Date  10/02/2019 Action  Given Dose  2 Tab Route  Enteral Tube Site            SODIUM CHLORIDE 0.9 % IV SOLN     Admin Date  10/02/2019 Action  New Bag Dose  250 mL Rate   Route   Site             Admin Date  10/02/2019 Action  New Bag Dose  250 mL Rate   Route   Site             Admin Date  10/02/2019 Action  New Bag Dose  250 mL Rate   Route   Site             Admin Date  10/02/2019 Action  New Bag Dose  1000 mL Rate   Route   Site             Admin Date  10/02/2019 Action  New Bag Dose   Rate   Route   Site            sodium polystyrene (KAYEXALATE) 15 GM/60ML suspension 30 g     Admin Date  10/02/2019 Action  Given Dose  30 g Route  Enteral Tube Site            thiamine (B-1) 500 mg in D5W 100 mL IVPB     Admin Date  10/02/2019 Action  New Bag Dose  500 mg Rate  200 mL/hr Route  Intravenous Site             Admin Date  10/03/2019 Action  New Bag Dose  500 mg Rate  200 mL/hr Route  Intravenous Site            thiamine tablet 100 mg     Admin Date  10/01/2019 Action  Given Dose  100 mg Route  Oral Site             Admin Date  10/02/2019 Action  Given Dose  100 mg Route  Oral Site            vasopressin (VASOSTRICT) 20 Units in  mL Infusion     Admin Date  10/02/2019 Action  New Bag Dose  0.03 Units/min Rate  9 mL/hr Route  Intravenous Site             Admin Date  10/02/2019  Action  New Bag Dose  0.03 Units/min Rate  9 mL/hr Route  Intravenous Site             Admin Date  10/03/2019 Action  New Bag Dose  0.03 Units/min Rate  9 mL/hr Route  Intravenous Site             Admin Date  10/03/2019 Action  New Bag Dose  0.03 Units/min Rate  9 mL/hr Route  Intravenous Site                FamHx:  Family History   Problem Relation Age of Onset   • Cancer Mother      Allergies:  NKDA     SocHx:  Social History     Socioeconomic History   • Marital status: Single     Spouse name: Not on file   • Number of children: Not on file   • Years of education: Not on file   • Highest education level: Not on file   Occupational History   • Not on file   Social Needs   • Financial resource strain: Not on file   • Food insecurity:     Worry: Not on file     Inability: Not on file   • Transportation needs:     Medical: Not on file     Non-medical: Not on file   Tobacco Use   • Smoking status: Never Smoker   • Smokeless tobacco: Former User     Types: Chew   Substance and Sexual Activity   • Alcohol use: Yes     Alcohol/week: 2.4 - 4.8 oz     Types: 4 - 8 Cans of beer per week   • Drug use: No   • Sexual activity: Not on file   Lifestyle   • Physical activity:     Days per week: Not on file     Minutes per session: Not on file   • Stress: Not on file   Relationships   • Social connections:     Talks on phone: Not on file     Gets together: Not on file     Attends Orthodox service: Not on file     Active member of club or organization: Not on file     Attends meetings of clubs or organizations: Not on file     Relationship status: Not on file   • Intimate partner violence:     Fear of current or ex partner: Not on file     Emotionally abused: Not on file     Physically abused: Not on file     Forced sexual activity: Not on file   Other Topics Concern   • Not on file   Social History Narrative   • Not on file       PHYSICAL EXAM:  Vitals:  Vitals:    10/03/19 1414 10/03/19 1500 10/03/19 1600 10/03/19 1632   BP:        Pulse:  (!) 102 (!) 108 (!) 106   Resp:  (!) 25 18 (!) 28   Temp:   36.8 °C (98.2 °F)    TempSrc:   Bladder    SpO2: 94% 96% 97% 93%   Weight:       Height:           Physical Exam   Constitutional: He has a sickly appearance. He is sedated and intubated.   Neck: Edema present.   Cardiovascular: S1 normal and S2 normal. Exam reveals gallop, S3, distant heart sounds (Obesity) and decreased pulses.   Pulmonary/Chest: He is intubated.   Abdominal: He exhibits distension and ascites. Bowel sounds are decreased.   Musculoskeletal: He exhibits edema.   Skin: Skin is intact. No abrasion and no burn noted.     Labs:  Recent Labs     10/01/19  1346 10/02/19  0430 10/03/19  0413   WBC 14.9* 23.3* 16.5*   RBC 5.31 5.03 3.70*   HEMOGLOBIN 18.0 17.1 12.0*   HEMATOCRIT 55.8* 53.6* 41.3*   .1* 106.6* 110.3*   MCH 33.9* 34.0* 32.4   RDW 63.7* 65.8* 70.5*   PLATELETCT 196 135* 96*   MPV 9.6 10.4 10.7   NEUTSPOLYS 87.20* 78.80* 75.40*   LYMPHOCYTES 5.40* 3.40* 12.30*   MONOCYTES 6.10 5.90 3.50   EOSINOPHILS 0.00 0.00 0.90   BASOPHILS 0.30 0.00 0.00   RBCMORPHOLO  --  Present Present     Recent Labs     10/02/19  0430 10/02/19  1356 10/03/19  0413   SODIUM 136 139 140   POTASSIUM 6.5* 4.8 4.8   CHLORIDE 104 105 107   CO2 22 22 18*   GLUCOSE 217* 148* 166*   BUN 41* 50* 59*     Recent Labs     10/01/19  1346 10/02/19  0430 10/02/19  1356 10/03/19  0413   ALBUMIN 3.8 3.0*  --   --    TBILIRUBIN 1.3 1.6*  --   --    ALKPHOSPHAT 125* 90  --   --    TOTPROTEIN 6.5 5.3*  --   --    ALTSGPT 25 14  --   --    ASTSGOT 17 12  --   --    CREATININE 0.94 1.71* 2.29* 3.42*     Recent Labs     10/01/19  1346 10/02/19  0430 10/02/19  1356 10/03/19  0413   SODIUM 140 136 139 140   POTASSIUM 3.6 6.5* 4.8 4.8   CHLORIDE 102 104 105 107   CO2 21 22 22 18*   BUN 30* 41* 50* 59*   CREATININE 0.94 1.71* 2.29* 3.42*   MAGNESIUM 1.7 1.5  --  1.9   PHOSPHORUS 5.7* 6.7*  --  6.5*   CALCIUM 8.5 7.0* 7.1* 6.5*     Recent Labs     10/01/19  1342  10/02/19  0430 10/02/19  1356 10/03/19  0050 10/03/19  0413   ALTSGPT 25 14  --   --   --    ASTSGOT 17 12  --   --   --    ALKPHOSPHAT 125* 90  --   --   --    TBILIRUBIN 1.3 1.6*  --   --   --    PREALBUMIN  --   --   --  9.0*  --    GLUCOSE 164* 217* 148*  --  166*     Recent Labs     10/01/19  1346 10/02/19  0430 10/03/19  0413   RBC 5.31 5.03 3.70*   HEMOGLOBIN 18.0 17.1 12.0*   HEMATOCRIT 55.8* 53.6* 41.3*   PLATELETCT 196 135* 96*   PROTHROMBTM 14.4  --   --    INR 1.09  --   --          Intake/Output Summary (Last 24 hours) at 10/3/2019 1755  Last data filed at 10/3/2019 1600  Gross per 24 hour   Intake 8415.83 ml   Output 170 ml   Net 8245.83 ml       100% FiO2  T high=4.0    Imaging: I reviewed.  CXR-  CT Abdomen-pockets/loculations visible. Slightly nodular hepatic contour suggestive of hepatic fibrosis (per radiology impression).   US Abdomen-hepatomegaly and echogenic liver, compatible with fatty change versus fibrosis (per radiology impression). Scattered abd ascites. Splenomegaly.   EKG-sinus tachycardia. Borderline prolonged QT interval (per cardiology findings).  Echo- LVEF-75%. RV mod dilated with reduced systolic function.     ASSESSMENT and PLAN:    Faisal Phillips, a 63 year old male, presented to the ED c/o abdominal pain and worsening SOB admitted to the ICU with acute respiratory failure with hypoxia and hypercapnia requiring intubation complicated by septic shock, and found to have worsening renal function. Upon admission, his Cr was 0.94.  Upon consultation today, his Cr was 3.42.      # 1 Acute Kidney Injury, oliguric, worsening.   -Etiology: Septic shock requiring aggressive use of vasopressors.  -Discontinued fluids. Started on CRRT -UF 60 mL over net.  -Recommendations: Monitor blood pressure closely. Daily Labs. I/O's.       Debbie Roa, MS4

## 2019-10-03 NOTE — PROGRESS NOTES
MD notified about patient's increasing PA pressures, abdominal pressure of 20, low urine output, increasing lactic.     MD at bedside, orders received to increase sedation to decrease abdominal pressure.   RT orders received to start Flolan to decrease pulmonary pressures. Orders received and implemented.

## 2019-10-03 NOTE — CARE PLAN
Problem: Pain Management  Goal: Pain level will decrease to patient's comfort goal  Outcome: PROGRESSING AS EXPECTED  Flowsheets (Taken 10/2/2019 2200)  CPOT Total Score: 0  Note:   Patient on fentanyl drip, pain assessed q2 hours.      Problem: Safety - Medical Restraint  Goal: Remains free of injury from restraints (Restraint for Interference with Medical Device)  Description  INTERVENTIONS:  1. Determine that other, less restrictive measures have been tried or would not be effective before applying the restraint  2. Evaluate the patient's condition at the time of restraint application  3. Inform patient/family regarding the reason for restraint  4. Q2H: Monitor safety, psychosocial status, comfort, nutrition and hydration  Outcome: PROGRESSING AS EXPECTED  Flowsheets (Taken 10/2/2019 2329)  Addressed this shift: Remains free of injury from restraints (restraint for interference with medical device): Determine that other, less restrictive measures have been tried or would not be effective before applying the restraint; Evaluate the patient's condition at the time of restraint application; Inform patient/family regarding the reason for restraint; Every 2 hours: Monitor safety, psychosocial status, comfort, nutrition and hydration  Note:   Restraint orders verified, patient educated on restraint discontinuation criteria. Restraints adjusted q2 hours to prevent CMS injury.

## 2019-10-03 NOTE — PROGRESS NOTES
Continuous CVVHD treatment ordered by Dr Wilder.Treatment initiated at 1415,tolerating thus far with minimum UF. Had to wait ( 2 H ) on Primary Rn to become available for inservice

## 2019-10-03 NOTE — PROGRESS NOTES
Critical Care Progress Note    Date of admission  10/1/2019    Chief Complaint  63 y.o. male admitted 10/1/2019 with shortness of breath    Hospital Course    This gentleman was admitted to the ICU with respiratory failure, right heart failure, anasarca and alcohol abuse.      Interval Problem Update  Reviewed last 24 hour events:      0715 hours:    ST  Hemodynamics for the last several hours reviewed  Vent waveforms and airway mechanics reviewed  Very low urine output  Bladder pressure 20  Andrea 250  NE 15  Vaso 0.03  SAT done - previously on Fent 200 and Prop 60  With SAT - will open eyes  Followed commands last night    0910 hours:    Sedation resumed at 50% of prior rate  Fent 100  Prop 30  Andrea 250  NE 15  Vaso 0.03  Vent - change to APRV - close observation of hemodynamics, Ve  Renal failure worse  Flolan at 12  Vent day 2    1700 hours:    Multiple serial reassessments  Now on CRRT  Flolan 0.042  fent 100  NE 30  Vaso 0.03  Andrea 300  Prop 30  Bladder pressures up to 30 - I will paralyze with vecuronium  BIS monitoring  Cont to monitor bladder pressure  Tolerating APRV - will likely need to switch to volume ventilation with paralytic - following ABG and Ve  I will titrate off Flolan - no improvement in hemodynamics - monitor saturations and oxygenation status    2100 hours:    Multiple serial reassessments  Now paralyzed on vecuronium  Bladder pressure decreased  Now on volume ventilation - adjustments made based upon ABG analysis of ventilator waveforms and airway mechanics  Worsening oxygenation off of Flolan - resume Flolan  Continue CRRT  Fent 100  NE 15  Andrea 200  Prop 25  Vaso 0.03      Review of Systems  Review of Systems   Unable to perform ROS: Acuity of condition        Vital Signs for last 24 hours   Temp:  [35.9 °C (96.6 °F)-38.3 °C (100.9 °F)] 35.9 °C (96.6 °F)  Pulse:  [] 98  Resp:  [13-35] 26  BP: ()/(49-71) 135/71  SpO2:  [82 %-98 %] 82 %    Hemodynamic parameters for last 24  hours  CVP:  [-3 MM HG-25 MM HG] 20 MM HG  PCWP:  [21 MM HG-30 MM HG] 30 MM HG  CO:  [8.3-13.4] 8.3  CI:  [3.2-5.2] 3.2    Respiratory Information for the last 24 hours  [place] Vent Mode: APVCMV  Rate (breaths/min): 30  Vt Target (mL): 480  PEEP/CPAP: 16  FiO2: 100  P Support: 0  P MEAN: 23  Control VTE (exp VT): 476    Physical Exam   Physical Exam   Constitutional:   On ventilator   HENT:   Head: Normocephalic.   Right Ear: External ear normal.   Left Ear: External ear normal.   Mouth/Throat: Oropharynx is clear and moist.   Eyes: Pupils are equal, round, and reactive to light. Right eye exhibits no discharge. Left eye exhibits no discharge. No scleral icterus.   Neck: Normal range of motion. Neck supple. No tracheal deviation present.   Cardiovascular: Intact distal pulses. Exam reveals no friction rub.   Sinus rhythm   Pulmonary/Chest: He has no wheezes. He has rales (Scattered crackles at the bases).   Abdominal: He exhibits distension. There is no tenderness. There is no rebound.   Obese   Musculoskeletal: Normal range of motion. He exhibits edema (legs with significant edema).   No clubbing or cyanosis   Neurological:   Sedated.  Earlier today opened eyes with SAT, but did not follow.  Was moving all 4 extremities.  Later in the day, sedated and paralyzed.   Skin: He is not diaphoretic. No erythema. No pallor.       Medications  Current Facility-Administered Medications   Medication Dose Route Frequency Provider Last Rate Last Dose   • epoprostenol (FLOLAN) 1.5 mg in glycine diluent for flolan 50 mL for Inhalation  0.01-0.05 mcg/kg/min (Order-Specific) Inhalation Continuous Aramis Perez M.D. 12 mL/hr at 10/03/19 2131 0.042 mcg/kg/min at 10/03/19 2131   • lactulose 20 GM/30ML solution 30 mL  30 mL Enteral Tube Q8HRS Mike Arndt M.D.   30 mL at 10/03/19 2119   • EPINEPHrine 16 mg in  mL Infusion  0-10 mcg/min Intravenous Continuous Mike Arndt M.D.   Stopped at 10/03/19 1430   •  norepinephrine (LEVOPHED) 16 mg in  mL Infusion  0-30 mcg/min Intravenous Continuous Mike Arndt M.D. 14.1 mL/hr at 10/03/19 2208 15 mcg/min at 10/03/19 2208   • phenylephrine (DAMON-SYNEPHRINE) 320 mg in  mL Infusion  0-300 mcg/min Intravenous Continuous Mike Arndt M.D. 18.8 mL/hr at 10/03/19 2155 200 mcg/min at 10/03/19 2155   • artificial tears (EYE LUBRICANT) ophth ointment 1 Application  1 Application Both Eyes Q8HRS Mike Arndt M.D.   1 Application at 10/03/19 2121   • vecuronium (NORCURON) 60 mg in D5W 500 mL Infusion  0-1.7 mcg/kg/min Intravenous Continuous Mike Arndt M.D. 71.6 mL/hr at 10/03/19 1810 1 mcg/kg/min at 10/03/19 1810   • vecuronium (NORCURON) injection 14.32 mg  0.1 mg/kg Intravenous Q2HRS PRN Mike Arndt M.D.       • fentaNYL (SUBLIMAZE) 50 mcg/mL in 50mL   Intravenous Continuous Mike Arndt M.D. 2 mL/hr at 10/03/19 1647 100 mcg/hr at 10/03/19 1647   • Respiratory Care per Protocol   Nebulization Continuous RT Lencho Casillas Jr., D.O.       • famotidine (PEPCID) tablet 20 mg  20 mg Enteral Tube Q12HRS Lencho Casillas Jr., D.O.        Or   • famotidine (PEPCID) injection 20 mg  20 mg Intravenous Q12HRS Lencho Casillas Jr. D.O.   20 mg at 10/03/19 1736   • lidocaine (XYLOCAINE) 1 % injection 1-2 mL  1-2 mL Tracheal Tube Q30 MIN PRN Lencho Casillas Jr., D.O.       • vasopressin (VASOSTRICT) 20 Units in  mL Infusion  0.03 Units/min Intravenous Continuous Lencho Casillas Jr., D.O. 9 mL/hr at 10/03/19 1411 0.03 Units/min at 10/03/19 1411   • ipratropium-albuterol (DUONEB) nebulizer solution  3 mL Nebulization Q2HRS PRN (RT) Mike Arndt M.D.       • fentaNYL (SUBLIMAZE) injection 25 mcg  25 mcg Intravenous Q HOUR PRN Mike Arndt M.D.        Or   • fentaNYL (SUBLIMAZE) injection 50 mcg  50 mcg Intravenous Q HOUR PRN Mike Arndt M.D.        Or   • fentaNYL (SUBLIMAZE) injection 100 mcg   100 mcg Intravenous Q HOUR PRN Mike Arndt M.D.   100 mcg at 10/03/19 0312   • ipratropium-albuterol (DUONEB) nebulizer solution  3 mL Nebulization Q4HRS (RT) Mike Arndt M.D.   3 mL at 10/03/19 2132   • Pharmacy Consult: Enteral tube insertion - review meds/change route/product selection  1 Each Other PHARMACY TO DOSE Mike Arndt M.D.       • insulin regular (HUMULIN R) injection 2-9 Units  2-9 Units Subcutaneous Q6HRS Mike Arndt M.D.   Stopped at 10/03/19 1200    And   • DEXTROSE 10% BOLUS 250 mL  250 mL Intravenous Q15 MIN PRN Mike Arndt M.D.       • thiamine (B-1) 500 mg in D5W 100 mL IVPB  500 mg Intravenous DAILY Mike Arndt M.D. 200 mL/hr at 10/03/19 0501 500 mg at 10/03/19 0501   • propofol (DIPRIVAN) injection  0-80 mcg/kg/min Intravenous Continuous Mike Arndt M.D. 21.5 mL/hr at 10/03/19 2155 25 mcg/kg/min at 10/03/19 2155   • promethazine (PHENERGAN) tablet 12.5-25 mg  12.5-25 mg Enteral Tube Q4HRS PRN Mike Arndt M.D.       • ondansetron (ZOFRAN ODT) dispertab 4 mg  4 mg Enteral Tube Q4HRS PRN Mike Arndt M.D.       • folic acid (FOLVITE) tablet 1 mg  1 mg Enteral Tube DAILY Mike Arndt M.D.   Stopped at 10/03/19 0600    And   • multivitamin (THERAGRAN) tablet 1 Tab  1 Tab Enteral Tube DAILY Mike Arndt M.D.   Stopped at 10/03/19 0600   • cloNIDine (CATAPRES) tablet 0.1 mg  0.1 mg Enteral Tube Q HOUR PRN Mike Arndt M.D.       • heparin injection 5,000 Units  5,000 Units Subcutaneous Q8HRS Mike Arndt M.D.   5,000 Units at 10/03/19 2120   • piperacillin-tazobactam (ZOSYN) 4.5 g in  mL IVPB  4.5 g Intravenous Q8HRS Mike Arndt M.D. 25 mL/hr at 10/03/19 2120 4.5 g at 10/03/19 2120   • Linezolid (ZYVOX) premix 600 mg  600 mg Intravenous Q12HRS Mike Arndt M.D.   Stopped at 10/03/19 1835   • doxycycline (VIBRAMYCIN) 100 mg in  mL  IVPB  100 mg Intravenous Q12HRS Mike Arndt M.D.   Stopped at 10/03/19 2112   • labetalol (NORMODYNE,TRANDATE) injection 10 mg  10 mg Intravenous Q4HRS PRJACE Diego M.D.       • ondansetron (ZOFRAN) syringe/vial injection 4 mg  4 mg Intravenous Q4HRS PRJACE Diego M.D.   4 mg at 10/01/19 2020   • promethazine (PHENERGAN) suppository 12.5-25 mg  12.5-25 mg Rectal Q4HRS PRN Roselia Diego M.D.       • prochlorperazine (COMPAZINE) injection 5-10 mg  5-10 mg Intravenous Q4HRS PRJACE Diego M.D.           Fluids    Intake/Output Summary (Last 24 hours) at 10/3/2019 2227  Last data filed at 10/3/2019 2000  Gross per 24 hour   Intake 5854.86 ml   Output 1039 ml   Net 4815.86 ml       Laboratory  Recent Labs     10/01/19  1517  10/03/19  1815 10/03/19  1907 10/03/19  2030   WFWNU04C 7.38*  --   --   --   --    XZEMFJ891Q 33.5  --   --   --   --    QRWEQ192E 79.1  --   --   --   --    OLNC8IMH 94.6  --   --   --   --    ARTHCO3 19  --   --   --   --    [id number] 10.0  --   --   --   --    ARTBE -5*  --   --   --   --    ISTATAPH  --    < > 7.183* 7.168* 7.245*   ISTATAPCO2  --    < > 49.3* 52.9* 44.2*   ISTATAPO2  --    < > 109* 80 78   ISTATATCO2  --    < > 20 21 21   [id number]  --    < > 97 92* 93   ISTATARTHCO3  --    < > 18.6 19.2 19.2   ISTATARTBE  --    < > -10* -9* -8*   ISTATTEMP  --    < > 38.2 C 36.0 C 35.8 C   ISTATFIO2  --    < > 100 100 100   ISTATSPEC  --    < > Arterial Arterial Arterial   ISTATAPHTC  --    < > 7.167* 7.181* 7.262*   [id number]  --    < > 117* 75 72    < > = values in this interval not displayed.         Recent Labs     10/01/19  1346 10/02/19  0430 10/02/19  1356 10/03/19  0413   SODIUM 140 136 139 140   POTASSIUM 3.6 6.5* 4.8 4.8   CHLORIDE 102 104 105 107   CO2 21 22 22 18*   BUN 30* 41* 50* 59*   CREATININE 0.94 1.71* 2.29* 3.42*   MAGNESIUM 1.7 1.5  --  1.9   PHOSPHORUS 5.7* 6.7*  --  6.5*   CALCIUM 8.5 7.0* 7.1* 6.5*     Recent Labs     10/01/19  1346  10/02/19  0430 10/02/19  1356 10/03/19  0050 10/03/19  0413   ALTSGPT 25 14  --   --   --    ASTSGOT 17 12  --   --   --    ALKPHOSPHAT 125* 90  --   --   --    TBILIRUBIN 1.3 1.6*  --   --   --    PREALBUMIN  --   --   --  9.0*  --    GLUCOSE 164* 217* 148*  --  166*     Recent Labs     10/01/19  1346 10/02/19  0430 10/03/19  0413   WBC 14.9* 23.3* 16.5*   NEUTSPOLYS 87.20* 78.80* 75.40*   LYMPHOCYTES 5.40* 3.40* 12.30*   MONOCYTES 6.10 5.90 3.50   EOSINOPHILS 0.00 0.00 0.90   BASOPHILS 0.30 0.00 0.00   ASTSGOT 17 12  --    ALTSGPT 25 14  --    ALKPHOSPHAT 125* 90  --    TBILIRUBIN 1.3 1.6*  --      Recent Labs     10/01/19  1346 10/02/19  0430 10/03/19  0413   RBC 5.31 5.03 3.70*   HEMOGLOBIN 18.0 17.1 12.0*   HEMATOCRIT 55.8* 53.6* 41.3*   PLATELETCT 196 135* 96*   PROTHROMBTM 14.4  --   --    INR 1.09  --   --        Imaging  X-Ray:  I have personally reviewed the images and compared with prior images. and My impression is: Unchanged left greater than right lung opacities    Assessment/Plan  * Acute respiratory failure with hypoxia and hypercapnia (HCC)- (present on admission)  Assessment & Plan  Intubated 10/1  All the appropriate ventilator bundles are in place  Continue full vent support    Pneumonia  Assessment & Plan  Influenza screen negative  Nasal MRSA PCR negative  Follow-up BAL cultures  Continue Zosyn, Zyvox and doxycycline    Septic shock (HCC)  Assessment & Plan  He remains in shock  I am titrating norepinephrine and phenylephrine to keep mean arterial pressure greater than 65  Continue vasopressin  Continue broad-spectrum antibiotics  Follow-up cultures    Alcohol abuse  Assessment & Plan  Supplement vitamins  Cessation counseling when clinically appropriate    Acute kidney injury (HCC)  Assessment & Plan  Begin CRRT  Renal dose medications and avoid nephrotoxins    Cirrhosis (HCC)- (present on admission)  Assessment & Plan  Avoid hepatotoxins  Intra-abdominal hypertension with bladder pressure of  30  Paralyzed and sedated  Monitor bladder pressure  Bedside ultrasound does not reveal significant ascites for safe paracentesis    Pulmonary hypertension (HCC)- (present on admission)  Assessment & Plan  History of LITTLE       VTE:  Heparin  Ulcer: H2 Antagonist  Lines: Central Line  Ongoing indication addressed, Arterial Line  Ongoing indication addressed and Sheikh Catheter  Ongoing indication addressed    I have performed a physical exam and reviewed and updated ROS and Plan today (10/3/2019). In review of yesterday's note (10/2/2019), there are no changes except as documented above.     I have assessed and reassessed his respiratory status with ventilator adjustments, ventilator waveforms, airway mechanics, hemodynamics, blood pressure, cardiovascular status with titration of phenylephrine, norepinephrine and vasopressin.  I have assessed and reassessed his neurologic status with titration of propofol and fentanyl.  He is at high risk for worsening cardiovascular, respiratory and renal system dysfunction.    Discussed patient condition and risk of morbidity and/or mortality with RN, RT, Pharmacy, Charge nurse / hot rounds, QA team and nephrology     The patient remains critically ill.  Critical care time = 220 minutes in directly providing and coordinating critical care and extensive data review.  No time overlap and excludes procedures.    Mike Arndt MD  Pulmonary and Critical Care Medicine

## 2019-10-03 NOTE — ASSESSMENT & PLAN NOTE
Influenza screen negative  Nasal MRSA PCR negative  BAL cultures with usual inocencia  Stop doxycycline  Continue Zosyn

## 2019-10-03 NOTE — CARE PLAN
Problem: Fluid Volume:  Goal: Will maintain balanced intake and output  Outcome: NOT MET  Note:   Monitor strict I&Os q 2 hr, concentrate gtts, coordinate with care team to initiate dialysis  Intervention: Monitor, educate, and encourage compliance with therapeutic intake of liquids  Note:          Problem: Hemodynamic Status  Goal: Vital Signs and Fluid Balance Management  Outcome: NOT MET  Intervention: Cardiac Monitoring, Pulse Oximetry  Note:   Art line in place to continuously monitor BP and MAP, pulse ox on L index finger to monitor O2 sat and HR  Intervention: Hemodynamic Monitoring  Note:   Monitoring I&O q2hr, monitoring arterial BP and MAP  Intervention: Monitor I & O, Manage IV fluids and IV infusions  Note:   Strict I&O monitoring q2hr, IV fluids running per MD order, IV infusions running per MD order and titrated to reach parameters set by MD  Intervention: Daily Weights  Note:   Completed using bed scale  Intervention: Assess peripheral pulses and capillary refill  Note:   1+ radial and dorsalis pedis pulses, capillary refill < 3 sec all four extremities  Intervention: Position Patient for Maximum Circulation/Cardiac Output  Note:   HOB elevated  Intervention: Assess Color and Body Temperature  Note:   Completed  Intervention: Assess for edema (eg. peripheral, sacral, periorbital, abdominal edema)  Note:   Completed. Edema present.

## 2019-10-04 PROBLEM — J98.11 ATELECTASIS: Status: ACTIVE | Noted: 2019-01-01

## 2019-10-04 NOTE — PROCEDURES
Nephrology/Hemodialysis note    Patient with BISHNU/ATN septic shock on 4 pressors started on CRRT  Seen and examined during dialysis -UF 60 ml over net  To monitor BP closely  4 K bath  Please see dialysis flow sheet for details

## 2019-10-04 NOTE — PROGRESS NOTES
Gastric fluid draining from  Pt's mouth. Dr. Arndt notified, orders received to place OG. OG placed and verified with auscultation, connected to low suction and 550 gastric fluid drained immediately after tube placed.

## 2019-10-04 NOTE — CONSULTS
[facility]  Onsite Evalutation    Referral # [id number]    Date 10/3/2019 @ 18:30    Thank you for the referral of this patient. A chart review has been completed to determine suitability for organ donation.     Donation is an option.  - Upon meeting criteria for brain death, this patient will be a potential candidate for organ donation, pending further evaluation.   - This patient has been located on an organ donor registry and is a first person authorized donor in the event of their death. A copy of their document of gift has been placed in the hospital chart, it is a legally binding document.     [facility] will continue to follow this case. Guidelines for the management of a potential organ donor have been provided for use at the physician's discretion. Please call us immediately with any problems, questions, or significant changes in the patient's status. Please call us immediately with any plans for brain death evaluation, family meetings or plans to withdraw care.     Organ donation should not be mentioned without prior collaboration with [facility] so that the conversation can be a planned, one-time coordinated event with the health care team.     Call [phone] with any immediate needs.     Thank you for your continued support of organ and tissue donation.

## 2019-10-04 NOTE — PROGRESS NOTES
Critical care progress note:  This is a 63-year-old male has been admitted for respiratory failure, right heart failure, anasarca and alcohol abuse.  He is on propofol drip and fentanyl drip along with vecuronium for paralysis.  He is continues on Flolan as he did not tolerate a trial of weaning it.  He was on AP RV but he has been transitioned to volume control.  He had desaturations this evening down to the 70 to 80%.  His ABG was 7.24 PCO2 44 and PO2 78.  Increase the rate to 30 however this did not improve his oxygenation, reduce this to 28 after multiple titrations.  No significant change in oxygenation with reduction of volume.  Improved oxygenation to 83 to 85%.  ABG at this point showed 7.28, 40, 67.  Will allow for permissive acidosis.  PEEP was increased to 18.  Abdominal pressures currently 22-24.  Prior > 25.  On crrt for dialysis due to edema.       Reviewed chart, imaging, labs and notes/orders     Assessment and plan:  Respiratory failure with hypoxia  Edema  Pulmonary edema  Abdominal compartment syndrome  Pneumonia  Septic shock    ABG improved to 7.34/36/89.       Patient is critically ill. Remains on mechanical ventilator.  He is on multiple pressors. Remains on propofol and fentanyl.  On flolan.  Paralyzed on vecuronium.  Abdominal compartment syndrome with assessments of bowel pressures. If untreated there is a high chance of deterioration and eventually death. The critical that has been undertaken is medically complex. There has been no overlap in critical care time. Critical Care Time not including procedures:  40 minutes

## 2019-10-04 NOTE — CARE PLAN
Problem: Fluid Volume:  Goal: Will maintain balanced intake and output  Outcome: PROGRESSING AS EXPECTED  Intervention: Monitor, educate, and encourage compliance with therapeutic intake of liquids  Note:   Pt fluid status monitored with strict I/O documentation, assessment of skin integrity, edema and lung sounds.        Problem: Skin Integrity  Goal: Risk for impaired skin integrity will decrease  Outcome: PROGRESSING AS EXPECTED  Intervention: Assess risk factors for impaired skin integrity and/or pressure ulcers  Note:   Pt risk factors for impaired skin integrity assessed, Henry score documented in flowsheet. Mepilex placed, heel float boots and pillows in use for support and positioning, Q 2 hr turning and hourly rounding in effect.

## 2019-10-04 NOTE — PROGRESS NOTES
Critical Care Progress Note    Date of admission  10/1/2019    Chief Complaint  63 y.o. male admitted 10/1/2019 with shortness of breath    Hospital Course    This gentleman was admitted to the ICU with respiratory failure, right heart failure, anasarca and alcohol abuse.      Interval Problem Update  Reviewed last 24 hour events:      0715 hours:    SR  Flolan  Andrea 150  NE 15  Fent 100  Prop 20  Vaso 0.03  Vec 1  CRRT - net pull 100-150 mL/hour - pulled 930 mL over last shift  Plts down to 56  Bladder pressure 20  Minimal UOP  Usual inocencia on sputum culture, blood cultures negative - stop Zyvox - continue Zosyn and Doxy    1045 hours:    TOF - 0/4  BIS in place  Vec 1  Prop 20  Fent 100  SR  NE 15  Andrea 130  Vaso 0.03  CVP 20  CRRT  Bladder pressure 25  Vent 3  PEEP 18  90%  Flolan 12    2030 hours:    Multiple serial reassessments  Flolan  Fentanyl 100  NE 15  Andrea-10  Propofol 15  Vasopressin 0.03  Vecuronium drip  Ventilator waveforms and airway mechanics reviewed  NG to suction -1500 mL out today  CRRT with successful net fluid pull      Review of Systems  Review of Systems   Unable to perform ROS: Acuity of condition        Vital Signs for last 24 hours   Temp:  [35.9 °C (96.6 °F)-37 °C (98.6 °F)] 37 °C (98.6 °F)  Pulse:  [52-99] 97  Resp:  [16-32] 26  BP: ()/(51-87) 104/69  SpO2:  [71 %-100 %] 99 %    Hemodynamic parameters for last 24 hours  CVP:  [15 MM HG-19 MM HG] 19 MM HG  PCWP:  [17 MM HG-22 MM HG] 18 MM HG  CO:  [7.1-12.4] 7.1  CI:  [2.7-4.8] 2.7    Respiratory Information for the last 24 hours  [place] Vent Mode: APVCMV  Rate (breaths/min): 26  Vt Target (mL): 480  PEEP/CPAP: 18  FiO2: 90  P MEAN: 23  Control VTE (exp VT): 481    Physical Exam   Physical Exam   Constitutional:   On ventilator   HENT:   Head: Normocephalic and atraumatic.   Right Ear: External ear normal.   Left Ear: External ear normal.   Nose: Nose normal.   Mouth/Throat: No oropharyngeal exudate.   Eyes: Pupils are equal, round,  and reactive to light. Conjunctivae are normal. Right eye exhibits no discharge. Left eye exhibits no discharge.   Neck: Neck supple. No tracheal deviation present.   Cardiovascular: Intact distal pulses. Exam reveals no gallop.   Sinus rhythm   Pulmonary/Chest: He has no wheezes. He has rales (Coarse crackles bilaterally at the bases).   Abdominal: He exhibits distension. There is no tenderness. There is no rebound.   NG to suction.  Obese.   Musculoskeletal: Normal range of motion. He exhibits edema (legs with significant edema).   No clubbing or cyanosis   Neurological:   Sedated and paralyzed   Skin: Skin is warm and dry. No rash noted. He is not diaphoretic.       Medications  Current Facility-Administered Medications   Medication Dose Route Frequency Provider Last Rate Last Dose   • [START ON 10/5/2019] famotidine (PEPCID) tablet 20 mg  20 mg Enteral Tube DAILY Mike Arndt M.D.        Or   • [START ON 10/5/2019] famotidine (PEPCID) injection 20 mg  20 mg Intravenous DAILY Mike Arndt M.D.       • SODIUM CHLORIDE 0.9 % IV SOLN            • SODIUM CHLORIDE 0.9 % IV SOLN            • epoprostenol (FLOLAN) 1.5 mg in glycine diluent for flolan 50 mL for Inhalation  0.01-0.05 mcg/kg/min (Order-Specific) Inhalation Continuous Aramis Perez M.D. 12 mL/hr at 10/04/19 1739 0.042 mcg/kg/min at 10/04/19 1739   • lactulose 20 GM/30ML solution 30 mL  30 mL Enteral Tube Q8HRS Mike Arndt M.D.   Stopped at 10/04/19 1400   • EPINEPHrine 16 mg in  mL Infusion  0-10 mcg/min Intravenous Continuous Mike Arndt M.D.   Stopped at 10/03/19 1430   • norepinephrine (LEVOPHED) 16 mg in  mL Infusion  0-30 mcg/min Intravenous Continuous Mike Arndt M.D. 14.1 mL/hr at 10/04/19 1901 15 mcg/min at 10/04/19 1901   • phenylephrine (DAMON-SYNEPHRINE) 320 mg in  mL Infusion  0-300 mcg/min Intravenous Continuous Mike Arndt M.D. 0.9 mL/hr at 10/04/19 1901 10  mcg/min at 10/04/19 1901   • artificial tears (EYE LUBRICANT) ophth ointment 1 Application  1 Application Both Eyes Q8HRS Mike Arndt M.D.   1 Application at 10/04/19 1403   • vecuronium (NORCURON) 60 mg in D5W 500 mL Infusion  0-1.7 mcg/kg/min Intravenous Continuous Mike Arndt M.D. 71.6 mL/hr at 10/04/19 1903 1 mcg/kg/min at 10/04/19 1903   • vecuronium (NORCURON) injection 14.32 mg  0.1 mg/kg Intravenous Q2HRS PRN Mike Arndt M.D.       • fentaNYL (SUBLIMAZE) 50 mcg/mL in 50mL   Intravenous Continuous Mike Arndt M.D. 2 mL/hr at 10/04/19 1847 100 mcg/hr at 10/04/19 1847   • Respiratory Care per Protocol   Nebulization Continuous RT Lencho Casillas Jr., D.O.       • lidocaine (XYLOCAINE) 1 % injection 1-2 mL  1-2 mL Tracheal Tube Q30 MIN PRN Lencho Casillas Jr., D.O.       • vasopressin (VASOSTRICT) 20 Units in  mL Infusion  0.03 Units/min Intravenous Continuous FLORIAN Aguilar Jr..O. 9 mL/hr at 10/04/19 1902 0.03 Units/min at 10/04/19 1902   • ipratropium-albuterol (DUONEB) nebulizer solution  3 mL Nebulization Q2HRS PRN (RT) Mike Arndt M.D.       • fentaNYL (SUBLIMAZE) injection 25 mcg  25 mcg Intravenous Q HOUR PRN Mike Arndt M.D.        Or   • fentaNYL (SUBLIMAZE) injection 50 mcg  50 mcg Intravenous Q HOUR PRN Mike Arndt M.D.        Or   • fentaNYL (SUBLIMAZE) injection 100 mcg  100 mcg Intravenous Q HOUR PRN Mike Arndt M.D.   100 mcg at 10/03/19 0312   • ipratropium-albuterol (DUONEB) nebulizer solution  3 mL Nebulization Q4HRS (RT) Mike Arndt M.D.   3 mL at 10/04/19 1820   • Pharmacy Consult: Enteral tube insertion - review meds/change route/product selection  1 Each Other PHARMACY TO DOSE Mike Arndt M.D.       • insulin regular (HUMULIN R) injection 2-9 Units  2-9 Units Subcutaneous Q6HRS Mike Arndt M.D.   Stopped at 10/04/19 1200    And   • DEXTROSE 10% BOLUS 250 mL  250  mL Intravenous Q15 MIN PRN Mike Arndt M.D.       • propofol (DIPRIVAN) injection  0-80 mcg/kg/min Intravenous Continuous Mike Arndt M.D. 12.9 mL/hr at 10/04/19 1902 15 mcg/kg/min at 10/04/19 1902   • promethazine (PHENERGAN) tablet 12.5-25 mg  12.5-25 mg Enteral Tube Q4HRS PRN Mike Arndt M.D.       • ondansetron (ZOFRAN ODT) dispertab 4 mg  4 mg Enteral Tube Q4HRS PRN Mike Arndt M.D.       • folic acid (FOLVITE) tablet 1 mg  1 mg Enteral Tube DAILY Mike Arndt M.D.   1 mg at 10/04/19 0512    And   • multivitamin (THERAGRAN) tablet 1 Tab  1 Tab Enteral Tube DAILY Mike Arndt M.D.   1 Tab at 10/04/19 0512   • cloNIDine (CATAPRES) tablet 0.1 mg  0.1 mg Enteral Tube Q HOUR PRN Mike Arndt M.D.       • heparin injection 5,000 Units  5,000 Units Subcutaneous Q8HRS Mike Arndt M.D.   5,000 Units at 10/04/19 1407   • piperacillin-tazobactam (ZOSYN) 4.5 g in  mL IVPB  4.5 g Intravenous Q8HRS Mike Arndt M.D.   Stopped at 10/04/19 1658   • doxycycline (VIBRAMYCIN) 100 mg in  mL IVPB  100 mg Intravenous Q12HRS Mike Arndt M.D.   Stopped at 10/04/19 1841   • labetalol (NORMODYNE,TRANDATE) injection 10 mg  10 mg Intravenous Q4HRS PRN Roselia Diego M.D.       • ondansetron (ZOFRAN) syringe/vial injection 4 mg  4 mg Intravenous Q4HRS PRN Roselia Diego M.D.   4 mg at 10/01/19 2020   • promethazine (PHENERGAN) suppository 12.5-25 mg  12.5-25 mg Rectal Q4HRS PRN Roselia Diego M.D.       • prochlorperazine (COMPAZINE) injection 5-10 mg  5-10 mg Intravenous Q4HRS PATRICK Diego M.D.           Fluids    Intake/Output Summary (Last 24 hours) at 10/4/2019 2040  Last data filed at 10/4/2019 2000  Gross per 24 hour   Intake 3241.17 ml   Output 6516 ml   Net -3274.83 ml       Laboratory  Recent Labs     10/04/19  0501 10/04/19  0927 10/04/19  1317   ISTATAPH 7.300* 7.318* 7.373*   ISTATAPCO2 41.7* 40.4* 40.2*    ISTATAPO2 95* 105* 90*   ISTATATCO2 22 22 25   MECKTZB1EPZ 97 98 97   ISTATARTHCO3 20.6 20.7 23.4   ISTATARTBE -6* -5* -2   ISTATTEMP 36.8 C 36.9 C 35.9 C   ISTATFIO2 100 100 90   ISTATSPEC Arterial Arterial Arterial   ISTATAPHTC 7.303* 7.319* 7.389*   [id number] 93* 104* 84         Recent Labs     10/02/19  0430 10/02/19  1356 10/03/19  0413 10/04/19  0440   SODIUM 136 139 140 137   POTASSIUM 6.5* 4.8 4.8 4.8   CHLORIDE 104 105 107 103   CO2 22 22 18* 20   BUN 41* 50* 59* 37*   CREATININE 1.71* 2.29* 3.42* 2.82*   MAGNESIUM 1.5  --  1.9 1.8   PHOSPHORUS 6.7*  --  6.5* 4.6*   CALCIUM 7.0* 7.1* 6.5* 7.9*     Recent Labs     10/02/19  0430 10/02/19  1356 10/03/19  0050 10/03/19  0413 10/04/19  0440   ALTSGPT 14  --   --   --   --    ASTSGOT 12  --   --   --   --    ALKPHOSPHAT 90  --   --   --   --    TBILIRUBIN 1.6*  --   --   --   --    PREALBUMIN  --   --  9.0*  --   --    GLUCOSE 217* 148*  --  166* 154*     Recent Labs     10/02/19  0430 10/03/19  0413 10/04/19  0440   WBC 23.3* 16.5* 16.6*   NEUTSPOLYS 78.80* 75.40* 84.10*   LYMPHOCYTES 3.40* 12.30* 8.80*   MONOCYTES 5.90 3.50 7.10   EOSINOPHILS 0.00 0.90 0.00   BASOPHILS 0.00 0.00 0.00   ASTSGOT 12  --   --    ALTSGPT 14  --   --    ALKPHOSPHAT 90  --   --    TBILIRUBIN 1.6*  --   --      Recent Labs     10/02/19  0430 10/03/19  0413 10/04/19  0440   RBC 5.03 3.70* 3.40*   HEMOGLOBIN 17.1 12.0* 11.3*   HEMATOCRIT 53.6* 41.3* 37.6*   PLATELETCT 135* 96* 56*       Imaging  X-Ray:  I have personally reviewed the images and compared with prior images. and My impression is: Improved edema    Assessment/Plan  * Acute respiratory failure with hypoxia and hypercapnia (HCC)- (present on admission)  Assessment & Plan  Intubated 10/1  Continue full vent support  All the appropriate ventilator bundles are in place    Pneumonia  Assessment & Plan  Influenza screen negative  Nasal MRSA PCR negative  BAL cultures with usual inocencia  Stop Zyvox  Continue Zosyn and doxycycline  for now    Septic shock (HCC)  Assessment & Plan  I am titrating norepinephrine and phenylephrine to keep mean arterial pressure greater than 65  Continue vasopressin  Continue antibiotics    Alcohol abuse  Assessment & Plan  Supplement vitamins  Cessation counseling when clinically appropriate    Acute kidney injury (HCC)  Assessment & Plan  Continue CRRT  Renal dose medications and avoid nephrotoxins    Cirrhosis (HCC)- (present on admission)  Assessment & Plan  Avoid hepatotoxins  Intra-abdominal hypertension with bladder pressure of 30 on 10/3 - improved with paralysis and placement of NG to suction  Keep sedated and paralyzed  Monitor bladder pressure  Bedside ultrasound does not reveal significant ascites for safe paracentesis    Pulmonary hypertension (HCC)- (present on admission)  Assessment & Plan  History of LITTLE       VTE:  Heparin  Ulcer: H2 Antagonist  Lines: Central Line  Ongoing indication addressed, Arterial Line  Ongoing indication addressed and Sheikh Catheter  Ongoing indication addressed    I have performed a physical exam and reviewed and updated ROS and Plan today (10/4/2019). In review of yesterday's note (10/3/2019), there are no changes except as documented above.     I have assessed and reassessed his blood pressure, hemodynamics, cardiovascular status with titration of norepinephrine and phenylephrine.  I have assessed and reassessed his respiratory status with ventilator adjustments, airway mechanics and ventilator waveforms.  I have assessed and reassessed his neurologic status.  He is at high risk for worsening renal, respiratory and cardiovascular system dysfunction.    Discussed patient condition and risk of morbidity and/or mortality with RN, RT, Pharmacy, Charge nurse / hot rounds, QA team and nephrology     The patient remains critically ill.  Critical care time = 210 minutes in directly providing and coordinating critical care and extensive data review.  No time overlap and excludes  procedures.    Mike Arndt MD  Pulmonary and Critical Care Medicine

## 2019-10-04 NOTE — CARE PLAN
Problem: Ventilation Defect:  Goal: Ability to achieve and maintain unassisted ventilation or tolerate decreased levels of ventilator support  Outcome: PROGRESSING SLOWER THAN EXPECTED   Adult Ventilation Update    Total Vent Days: 3    Patient Lines/Drains/Airways Status      Active Airway       Name: Placement date: Placement time: Site: Days:    Airway ETT Oral 8.0  10/02/19   0005   Oral  2                                 Plateau Pressure (Q Shift): 27 (10/03/19 1833)  Static Compliance (ml / cm H2O): 39 (10/04/19 0310)    Patient failed trials because of Barriers to Wean: FiO2 >60% or PEEP >10 CM H2O (10/03/19 0530)          Sputum/Suction   Cough: Weak;Non Productive (10/03/19 1414)  Sputum Amount: Scant (10/04/19 0000)  Sputum Color: Clear (10/04/19 0000)  Sputum Consistency: Thin (10/04/19 0000)    Mobility  Level of Mobility: Level I (10/04/19 0200)  Activity Performed: Unable to mobilize (10/03/19 2000)  Reason Not Mobilized: Unstable condition (10/03/19 2000)  Mobilization Comments: on paralytics, Peep 16, CRRT (10/03/19 2000)    Events/Summary/Plan: Pt transitioned from APRV to CMV, multiple changes made throughout shift. Stable on current settings as of last ABG.

## 2019-10-04 NOTE — DIETARY
Nutrition Services: Brief update    Admit day 3.  Pt remains too unstable for enteral nutrition @ this time.   CRRT, intraabdominal pressure, and vasopressors reviewed in Rounds.   Latest RN note reports that pt now has OGT to suction.    RD will continue to follow and provide recommendations as indicated.

## 2019-10-04 NOTE — CARE PLAN
Vent Day 3. Patient has remained on CMV 26/480/+18 throughout shift. Flolan continuing @ 12ml/hour. Duoneb Q4.

## 2019-10-04 NOTE — PROCEDURES
Date of Procedure:  10/4/2019    Title of Procedure:  Diagnostic and therapeutic flexible fiberoptic bronchoscopy with bronchoalveolar lavage    Indication for Procedure:   Atelectasis.  Pneumonia.    Post-procedure Diagnoses:    1.  Normal endobronchial anatomy  2.  No endobronchial tumor identified  3.  Copious, thick juicy tan secretions seen in the right lower lobe bronchi and left lower lobe bronchi    Narrative:    The patient was sedated, intubated and ventilated at the time of this procedure.  The flexible fiberoptic bronchoscope was inserted through the lumen of the endotracheal tube and advanced into the distal trachea without difficulty.  The airways were examined to the subsegmental bronchus level bilaterally.    The endobronchial anatomy was normal.  No tumor was identified.    There was a copious amount of thick, juicy tan secretions seen in the right lower lobe bronchi and left lower lobe bronchi.  I therapeutically suctioned these secretions.    Bronchoalveolar lavage was carried out in the basilar segments of the right lower lobe bronchi using the standard technique with good fluid return.    Bronchoalveolar lavage fluid from the right lower lobe is submitted to the laboratory for cytology, gram stain, culture and sensitivity, acid fast bacilli smear and culture and fungal culture.    The patient tolerated the procedure quite nicely.  No complications were apparent.  The heart rate and rhythm, blood pressure and oxygenation saturation were continuously monitored.      Mike Arndt MD  Pulmonary and Critical Care Medicine

## 2019-10-04 NOTE — PROGRESS NOTES
Per Dr. Perez,    O2 saturations ok as long as above 70%    ABGs Q2 hours    Notify if abdominal pressure is greater than 25    Also notified Dr. Perez that we are currently unable to wedge our Ethel. No new orders at this time

## 2019-10-04 NOTE — PROGRESS NOTES
CVVHD treatment continuous ordered by Dr. Wilder. Treatment from 1900 to 0700. Changed cartridge due to + clotted dialyzer. Unable to return approx 200 mL of blood due to increase Balance Chamber and increased TMP.   Set up new system and lines secured. In service provided to Marichuy SOTO.   Call #[phone]- HD room number and Dr. Wilder's number for questions related to tx.

## 2019-10-04 NOTE — CONSULTS
DATE OF SERVICE:  10/03/2019    NEPHROLOGY CONSULTATION    REQUESTING PHYSICIAN:  Mike Canela MD    REASON FOR CONSULTATION:  To evaluate patient with acute kidney injury, septic   shock, anuria.    HISTORY OF PRESENT ILLNESS:  The patient is a 63-year-old male with chronic   kidney disease stage II, baseline creatinine level of 0.9, who was admitted to   the hospital on 10/01/2019 with worsening shortness of breath associated with   dry cough.  No chest pain.  Hospital course was complicated with respiratory   failure, required intubation, mechanical ventilation, septic shock, currently   on 3 pressors.  Upon admission, creatinine level was 0.9, worsened up to 3.42   on consultation day, also worsening metabolic acidosis and lactic acidosis.    REVIEW OF SYSTEMS:  Not possible to obtain as patient is intubated and   sedated.    PAST MEDICAL HISTORY:  Positive for sleep apnea, obesity.    PAST SURGICAL HISTORY:  Knee arthroscopy, gastrostomy.    FAMILY HISTORY:  Not possible to obtain.    SOCIAL HISTORY:  Patient is single.  No tobacco.  Positive for alcohol.  No   drugs.    ALLERGIES:  No known drug allergies.    OUTPATIENT AND INPATIENT MEDICATIONS:  Reviewed.    PHYSICAL EXAMINATION:  VITAL SIGNS:  Blood pressure 90/50, heart rate 109, temperature 37.2 Celsius.    Urine output:  None.  GENERAL APPEARANCE:  Well-developed, well-nourished male, intubated, on   mechanical ventilation, on 3 pressors, in critical condition.  HEENT:  Normocephalic, atraumatic.  Pupils equal, round, reactive to light.    Endotracheal tube in place.  NECK:  No thyromegaly, no lymphadenopathy appreciated.  LUNGS:  Coarse breath sounds bilaterally.  Decreased breath sounds at bases.  HEART:  Regular rate.  No rub or gallop.  ABDOMEN:  Distended.  Bowel sounds present.  No palpable masses.  EXTREMITIES:  Anasarca.  No cyanosis.  SKIN:  No rash, no ulcers.  Warm.  NEUROLOGIC:  Not possible to obtain as patient is  sedated.    LABORATORY DATA:  Hemoglobin level 12.0.  Sodium 140, potassium 4.8, CO2 of   18, BUN 59, creatinine level 3.42.  Lactic acid 3.4.    IMAGING:  Renal ultrasound unremarkable, no hydronephrosis.    ASSESSMENT AND PLAN:  The patient is a 63-year-old male with septic shock,   acute kidney injury, anuric, respiratory failure, intubated, on mechanical   ventilation.  1.  Acute kidney injury, likely secondary to acute tubular necrosis from renal   hypoperfusion.  To plan start dialysis emergently.  2.  Electrolytes remain well controlled.  3.  Metabolic acidosis, mild.  We will be correcting with dialysis.  4.  Volume, significantly overloaded.  We will attempt ultrafiltration with   hemodialysis as blood pressure tolerates.  5.  Anemia.  Hemoglobin level remains stable.  6.  Sepsis.  To adjust all antibiotics to renal doses.    RECOMMENDATIONS:  1.  Continuous renal replacement therapy.  2.  Close monitoring of basic metabolic panel.  To keep mean arterial pressure   above 65.  Avoid nephrotoxic agents.    Above plan was discussed with Dr. Canela.  We will follow up patient   closely.    Thank you for the consult.       ____________________________________     MD ANA COLEMAN / MARIO    DD:  10/03/2019 16:17:45  DT:  10/03/2019 20:46:11    D#:  [id number]  Job#:  [id number]

## 2019-10-04 NOTE — PROGRESS NOTES
Nephrology Daily Progress Note    Date of Service  10/4/2019    Chief Complaint  63 y.o. male with multiple medical problems, pulmonary HTN, liver cirrhosis, admitted 10/1/2019 with worsening SOB, resp failure required intubation/mechanical ventilation, now in BISHNU, anuric,  Started CRRT, septic shock on 4 pressors    Interval Problem Update  10/4  Intub/vent with high FiO2 requirements  Hypotensive on 3 pressors  Anuric  On CRRT  with  cc/hr over net -so far tolerates    Review of Systems  Review of Systems   Unable to perform ROS: Intubated        Physical Exam  Temp:  [35.9 °C (96.6 °F)-36.9 °C (98.4 °F)] 36.5 °C (97.7 °F)  Pulse:  [] 84  Resp:  [13-32] 26  BP: ()/(51-73) 109/65  SpO2:  [71 %-100 %] 98 %    Physical Exam   Constitutional: He is oriented to person, place, and time. He appears well-developed and well-nourished. No distress.   HENT:   Head: Normocephalic and atraumatic.   Nose: Nose normal.   Mouth/Throat: Oropharynx is clear and moist.   Eyes: Pupils are equal, round, and reactive to light. Conjunctivae and EOM are normal.   Neck: Normal range of motion. Neck supple. No thyromegaly present.   Cardiovascular: Normal rate and regular rhythm. Exam reveals no gallop and no friction rub.   Pulmonary/Chest: No respiratory distress. He has no wheezes. He has no rales.   Vented  Decreased BS at bases   Abdominal: He exhibits distension. He exhibits no mass.   diminished BS   Musculoskeletal: He exhibits edema.   anasarca   Neurological: He is alert and oriented to person, place, and time.   Skin: Skin is warm. No rash noted. No erythema.   Nursing note and vitals reviewed.      Fluids    Intake/Output Summary (Last 24 hours) at 10/4/2019 1555  Last data filed at 10/4/2019 1200  Gross per 24 hour   Intake 3039.7 ml   Output 7010 ml   Net -3970.3 ml       Laboratory  Recent Labs     10/02/19  0430 10/03/19  0413 10/04/19  0440   WBC 23.3* 16.5* 16.6*   RBC 5.03 3.70* 3.40*   HEMOGLOBIN 17.1  12.0* 11.3*   HEMATOCRIT 53.6* 41.3* 37.6*   .6* 110.3* 110.6*   MCH 34.0* 32.4 33.2*   MCHC 31.9* 29.4* 30.1*   RDW 65.8* 70.5* 70.5*   PLATELETCT 135* 96* 56*   MPV 10.4 10.7 11.0     Recent Labs     10/02/19  1356 10/03/19  0413 10/04/19  0440   SODIUM 139 140 137   POTASSIUM 4.8 4.8 4.8   CHLORIDE 105 107 103   CO2 22 18* 20   GLUCOSE 148* 166* 154*   BUN 50* 59* 37*   CREATININE 2.29* 3.42* 2.82*   CALCIUM 7.1* 6.5* 7.9*         No results for input(s): NTPROBNP in the last 72 hours.  Recent Labs     10/02/19  0430 10/04/19  0440   TRIGLYCERIDE 140 166*       Imaging  reviewed    Assessment/Plan  1.BISHNU/ATN/anuric on CRRT -please see dialysis flow sheet for details  2.Septic shock on 3 pressors   3.Electrolytes: well controlled.  4.Anemia: Hb stable  5.Volume:continue UF with CRRT as tolerates    Recs: CRRT             Keep MAP> 65             Daily labs             Avoid nephrotoxic agents             Prognosis guarded             D/w Dr. Arndt             Will follow closely

## 2019-10-04 NOTE — PROGRESS NOTES
CRRT continuous ordered by Dr. Wilder. From 1415 to 1900 Net  ml. Pt stable, vss, no distress. In-service given to ROMAN Castañeda RN.

## 2019-10-04 NOTE — PROGRESS NOTES
This RN was called to check the CRRT machine, arterial air alarm noted. Troubleshoot done. Flushed circuit 100cc NS, seen blood clots in the dialyzer. Pressures within normal limits. Arterial pod alarm noted as well. Reset arterial pod with ICU RN. Treatment restarted.   Call #[phone] for any problems.

## 2019-10-05 NOTE — PROGRESS NOTES
Yuriy from Lab called with critical result of Platelets=41 at 0516. Critical lab result read back to PARRIS Ventura RN.   Dr. Perez notified of critical lab result at 0523.  Critical lab result read back by Dr. Perez. Dr. Perez advises to continue with SQ heparin at this time.

## 2019-10-05 NOTE — PROGRESS NOTES
Nephrology Daily Progress Note    Date of Service  10/5/2019    Chief Complaint  63 y.o. male with multiple medical problems, pulmonary HTN, liver cirrhosis, admitted 10/1/2019 with worsening SOB, resp failure required intubation/mechanical ventilation, now in BISHNU, anuric,  Started CRRT, septic shock on 4 pressors    Interval Problem Update  10/4  Intub/vent with high FiO2 requirements  Hypotensive on 3 pressors  Anuric  On CRRT  with  cc/hr over net -so far tolerates  10/5  Intub /vent -FiO2 better to 70 % (from 90%)  Anuric  Hypotensive on 2 pressors  On CRRT -tolerates well  Review of Systems  Review of Systems   Unable to perform ROS: Intubated        Physical Exam  Temp:  [36.3 °C (97.3 °F)-37 °C (98.6 °F)] 36.8 °C (98.2 °F)  Pulse:  [] 108  Resp:  [26-30] 27  BP: ()/(53-87) 104/63  SpO2:  [83 %-100 %] 98 %    Physical Exam   Constitutional: He appears well-developed and well-nourished. He appears ill. No distress. He is sedated and intubated.   HENT:   Head: Normocephalic and atraumatic.   Nose: Nose normal.   Mouth/Throat: Oropharynx is clear and moist.   Eyes: Pupils are equal, round, and reactive to light. Conjunctivae and EOM are normal.   Neck: Normal range of motion. Neck supple. No thyromegaly present.   Cardiovascular: Normal rate. Exam reveals no gallop and no friction rub.   Pulmonary/Chest: He is intubated. No respiratory distress. He has no wheezes. He has no rales.   Vented  Coarse BS   Abdominal: He exhibits distension. He exhibits no mass.   Musculoskeletal: He exhibits edema.   anasarca   Neurological:   sedated   Skin: Skin is warm. No rash noted. No erythema.   Nursing note and vitals reviewed.      Fluids    Intake/Output Summary (Last 24 hours) at 10/5/2019 1538  Last data filed at 10/5/2019 1500  Gross per 24 hour   Intake 2348.24 ml   Output 4999 ml   Net -2650.76 ml       Laboratory  Recent Labs     10/03/19  0413 10/04/19  0440 10/05/19  0450   WBC 16.5* 16.6* 15.2*    RBC 3.70* 3.40* 3.06*   HEMOGLOBIN 12.0* 11.3* 10.0*   HEMATOCRIT 41.3* 37.6* 33.6*   .3* 110.6* 109.8*   MCH 32.4 33.2* 32.7   MCHC 29.4* 30.1* 29.8*   RDW 70.5* 70.5* 70.3*   PLATELETCT 96* 56* 41*   MPV 10.7 11.0 11.9     Recent Labs     10/03/19  0413 10/04/19  0440 10/05/19  0450   SODIUM 140 137 136   POTASSIUM 4.8 4.8 4.7   CHLORIDE 107 103 103   CO2 18* 20 23   GLUCOSE 166* 154* 154*   BUN 59* 37* 27*   CREATININE 3.42* 2.82* 2.21*   CALCIUM 6.5* 7.9* 8.2*         No results for input(s): NTPROBNP in the last 72 hours.  Recent Labs     10/04/19  0440   TRIGLYCERIDE 166*       Imaging  reviewed    Assessment/Plan  1.BISHNU/ATN/anuric on CRRT -please see dialysis flow sheet for details  2.Septic shock down to 2 pressors   3.Electrolytes: well controlled.  4.Anemia: drop in Hb level - to monitor  5.Volume:continue UF with CRRT as tolerates    Recs: CRRT             Keep MAP> 65             Daily labs             Avoid nephrotoxic agents             Prognosis guarded             D/w Dr. Arndt             Will follow closely

## 2019-10-05 NOTE — PROGRESS NOTES
Continuous CVVHD inservice/troubleshooting/contact number given to JOVAN Gonzalez RN    Total net UF over 22hrs; 4054ml

## 2019-10-05 NOTE — PROGRESS NOTES
2 RN skin check complete.   Devices in place BIS monitor, ET tube, cortrak, OG tube, BP cuff, bilateral soft wrist restraints, peters catheter, BMS, SCDs.  Skin assessed under devices: yes.  Confirmed pressure ulcers found on n/a.  Skin tear and blisters noted on left upper arm in antecubital area. Feet dusky and cool  The following interventions in place: pillows in use for support and positioning, waffle cushion overlay, q 2 hr turns.

## 2019-10-05 NOTE — CARE PLAN
Problem: Safety  Goal: Will remain free from injury  Outcome: PROGRESSING AS EXPECTED  Intervention: Provide assistance with mobility  Note:   Bed in low and locked position, lower bed rails up, bed alarm on.       Problem: Bowel/Gastric:  Goal: Normal bowel function is maintained or improved  Outcome: PROGRESSING SLOWER THAN EXPECTED  Intervention: Collaborate with Interdisciplinary Team for optimal positioning for bowel evacuation  Note:   Pt repositioned, BMS flushed. Abdominal x-rays taken for view of possible obstruction.     Problem: Fluid Volume:  Goal: Will maintain balanced intake and output  Outcome: PROGRESSING SLOWER THAN EXPECTED  Note:   Pt fluid status monitored with strict I/O documentation, assessment of skin integrity, edema and lung sounds. CRRT in progress.  Significant edema present

## 2019-10-05 NOTE — PROGRESS NOTES
Critical Care Progress Note    Date of admission  10/1/2019    Chief Complaint  63 y.o. male admitted 10/1/2019 with shortness of breath    Hospital Course    This gentleman was admitted to the ICU with respiratory failure, right heart failure, anasarca and alcohol abuse.      Interval Problem Update  Reviewed last 24 hour events:      0700 hours:    Vec 1  Andrea off  NE 10  Vaso 0.03  Prop 15  Fent 100  OG to suction  Bladder pressure 24  Vent waveforms and airway mechanics reviewed  Flolan  PEEP from 18-16 due to high peak pressures    1000 hours:    Vec off at 0800  PERRL  Prop 15  Fent 100  SR-ST  SBP 80-90  NE 12  Vaso 0.03  Andrea off  CVP 20  Vent 4  PEEP 16   70%  Flolan  Abd pressure 23  No BM yet  CRRT - 100 mL/hour pull  Stop Doxy  Cont Zosyn    1900 hours:    Multiple serial reassessments  Doing much worse over the course of the day  Difficulty oxygenating him  Multiple ventilator adjustments made with an attempt to improve oxygenation with APRV - not successful in achieving an adequate PaO2  I called Kriss, his POA - CODE STATUS changed to DNR  She is on her way and at this time      Review of Systems  Review of Systems   Unable to perform ROS: Acuity of condition        Vital Signs for last 24 hours   Temp:  [36.3 °C (97.3 °F)-37 °C (98.6 °F)] 36.9 °C (98.4 °F)  Pulse:  [] 108  Resp:  [13-30] 13  BP: ()/(53-75) 104/65  SpO2:  [98 %-100 %] 98 %    Hemodynamic parameters for last 24 hours  CVP:  [18 MM HG-22 MM HG] 20 MM HG  PCWP:  [18 MM HG-30 MM HG] 18 MM HG  CO:  [5.9-9] 7.2  CI:  [2.3-3.5] 2.8    Respiratory Information for the last 24 hours  Ventura Vent Mode: APRV  Rate (breaths/min): 30  Vt Target (mL): 400  PEEP/CPAP: 0  FiO2: 100  P Support: 0  P MEAN: 31  Control VTE (exp VT): 391    Physical Exam   Physical Exam   Constitutional:   On ventilator   HENT:   Head: Normocephalic.   Right Ear: External ear normal.   Left Ear: External ear normal.   Mouth/Throat: Oropharynx is clear and  moist.   Eyes: Pupils are equal, round, and reactive to light. Right eye exhibits no discharge. Left eye exhibits no discharge. No scleral icterus.   Neck: Normal range of motion. Neck supple. No tracheal deviation present.   Cardiovascular: Intact distal pulses. Exam reveals no friction rub.   Sinus rhythm   Pulmonary/Chest: He has no wheezes. He has rales (Coarse crackles bilaterally).   Abdominal: He exhibits distension. There is no tenderness. There is no rebound.   NG to suction.  Obese.   Musculoskeletal: Normal range of motion. He exhibits edema (legs with significant edema). He exhibits no tenderness.   No clubbing or cyanosis   Neurological:   Sedated and paralyzed   Skin: Skin is warm and dry. No rash noted. He is not diaphoretic. No erythema.       Medications  Current Facility-Administered Medications   Medication Dose Route Frequency Provider Last Rate Last Dose   • morphine (pf) 4 MG/ML injection 1-10 mg  1-10 mg Intravenous Q30 MIN PRN Mike Arndt M.D.       • LORazepam (ATIVAN) injection 1-10 mg  1-10 mg Intravenous Q30 MIN PRN Mike Arndt M.D.       • MD ALERT...adult comfort care   Other PRN Mike Arndt M.D.           Fluids    Intake/Output Summary (Last 24 hours) at 10/5/2019 1948  Last data filed at 10/5/2019 1700  Gross per 24 hour   Intake 1827.72 ml   Output 4584 ml   Net -2756.28 ml       Laboratory  Recent Labs     10/05/19  1513 10/05/19  1747 10/05/19  1843   ISTATAPH 7.272* 7.224* 7.124*   ISTATAPCO2 53.3* 58.9* 76.6*   ISTATAPO2 56* 38* 45*   ISTATATCO2 26 26 27   [id number] 84* 61* 64*   ISTATARTHCO3 24.6 24.3 25.1*   ISTATARTBE -3 -4 -5*   ISTATTEMP 36.9 C 37.1 C 36.9 C   ISTATFIO2 100 100 100   ISTATSPEC Arterial Arterial Arterial   ISTATAPHTC 7.273* 7.223* 7.125*   [id number] 55* 39* 44*         Recent Labs     10/03/19  0413 10/04/19  0440 10/05/19  0450   SODIUM 140 137 136   POTASSIUM 4.8 4.8 4.7   CHLORIDE 107 103 103   CO2 18* 20 23   BUN 59*  37* 27*   CREATININE 3.42* 2.82* 2.21*   MAGNESIUM 1.9 1.8 1.8   PHOSPHORUS 6.5* 4.6* 3.7   CALCIUM 6.5* 7.9* 8.2*     Recent Labs     10/03/19  0050 10/03/19  0413 10/04/19  0440 10/05/19  0450   PREALBUMIN 9.0*  --   --   --    GLUCOSE  --  166* 154* 154*     Recent Labs     10/03/19  0413 10/04/19  0440 10/05/19  0450   WBC 16.5* 16.6* 15.2*   NEUTSPOLYS 75.40* 84.10* 71.00   LYMPHOCYTES 12.30* 8.80* 7.90*   MONOCYTES 3.50 7.10 6.10   EOSINOPHILS 0.90 0.00 0.00   BASOPHILS 0.00 0.00 0.00     Recent Labs     10/03/19  0413 10/04/19  0440 10/05/19  0450   RBC 3.70* 3.40* 3.06*   HEMOGLOBIN 12.0* 11.3* 10.0*   HEMATOCRIT 41.3* 37.6* 33.6*   PLATELETCT 96* 56* 41*       Imaging  X-Ray:  I have personally reviewed the images and compared with prior images. and My impression is: Unchanged left lower lobe opacities    Assessment/Plan  * Acute respiratory failure with hypoxia and hypercapnia (HCC)- (present on admission)  Assessment & Plan  Intubated 10/1  The appropriate ventilator bundles are in place  Continue full vent support    Pneumonia  Assessment & Plan  Influenza screen negative  Nasal MRSA PCR negative  BAL cultures with usual inocencia  Stop doxycycline  Continue Zosyn    Septic shock (HCC)  Assessment & Plan  I am titrating norepinephrine to keep mean arterial pressure greater than 65  Continue antibiotics    Alcohol abuse  Assessment & Plan  Supplement vitamins  Cessation counseling when clinically appropriate    Acute kidney injury (HCC)  Assessment & Plan  Continue CRRT  Avoid nephrotoxins and renal dose medications    Cirrhosis (HCC)- (present on admission)  Assessment & Plan  Avoid hepatotoxins  Intra-abdominal hypertension with bladder pressure of 30 on 10/3 - improved with paralysis and placement of NG to suction  Stop vecuronium and continue to monitor bladder pressure  Bedside ultrasound does not reveal enough ascites for safe paracentesis    Pulmonary hypertension (HCC)- (present on  admission)  Assessment & Plan  History of LITTLE       VTE:  Heparin  Ulcer: H2 Antagonist  Lines: Central Line  Ongoing indication addressed, Arterial Line  Ongoing indication addressed and Sheikh Catheter  Ongoing indication addressed    I have performed a physical exam and reviewed and updated ROS and Plan today (10/5/2019). In review of yesterday's note (10/4/2019), there are no changes except as documented above.     I have assessed and reassessed his respiratory status with ventilator adjustments, blood pressure, hemodynamics, cardiovascular status with titration of norepinephrine and neurologic status.  He is at high risk for worsening respiratory, cardiovascular and renal system dysfunction.    Discussed patient condition and risk of morbidity and/or mortality with RN, RT, Pharmacy, Charge nurse / hot rounds, QA team and nephrology     The patient remains critically ill.  Critical care time = 125 minutes in directly providing and coordinating critical care and extensive data review.  No time overlap and excludes procedures.    Mike Arndt MD  Pulmonary and Critical Care Medicine

## 2019-10-05 NOTE — CARE PLAN
Adult Ventilation Update    Total Vent Days: 4    Patient Lines/Drains/Airways Status      Active Airway       Name: Placement date: Placement time: Site: Days:    Airway ETT Oral 8.0  10/02/19   0005   Oral  3                    Plateau Pressure (Q Shift): 32 (10/04/19 1346)  Static Compliance (ml / cm H2O): 43 (10/05/19 0150)    Patient failed trials because of Barriers to Wean: FiO2 >60% or PEEP >10 CM H2O (10/03/19 0530)  Barriers to SBT    Length of Weaning Trial      Cough: Weak;Non Productive (10/04/19 2200)  Sputum Amount: Scant (10/04/19 1200)  Sputum Color: Clear (10/04/19 1200)  Sputum Consistency: Thin (10/04/19 1200)    Mobility  Level of Mobility: Level I (10/04/19 2000)  Activity Performed: Unable to mobilize (10/04/19 2000)  Reason Not Mobilized: Other (comment)(paralyzed, PEEP 18) (10/04/19 2000)  Mobilization Comments: (Pt paralyzed, PEEP 18) (10/04/19 1200)    Events/Summary/Plan: patient continues on ventilation throughout the night. Patient tolerates this well. Will continue to monitor and assist as needed.

## 2019-10-05 NOTE — PROGRESS NOTES
Continuous on CVVHD as ordered, Blood returned @ 1915 due to sign cartridge clotting, replaced with new set up, then treatment restarted. In-service and training provided to primary RN. See flow sheet for details Preparation and process started @ 1830 and ended @  2200

## 2019-10-05 NOTE — CARE PLAN
Problem: Venous Thromboembolism (VTW)/Deep Vein Thrombosis (DVT) Prevention:  Goal: Patient will participate in Venous Thrombosis (VTE)/Deep Vein Thrombosis (DVT)Prevention Measures  Note:   SCD's on and heparin SQ given as ordered.      Problem: Bowel/Gastric:  Goal: Normal bowel function is maintained or improved  Note:   Pt with no BM overnight     Problem: Respiratory:  Goal: Respiratory status will improve  Note:   Pt tolerating vent settings well.      Problem: Fluid Volume:  Goal: Will maintain balanced intake and output  Note:   Pt tolerating CRRT well     Problem: Hemodynamic Status  Goal: Vital Signs and Fluid Balance Management  Note:   Andrea weaned off this morning     Problem: Urinary Elimination:  Goal: Ability to reestablish a normal urinary elimination pattern will improve  Note:   Minimal UOP overnight     Problem: Skin Integrity  Goal: Risk for impaired skin integrity will decrease  Note:   Pt turned q2h. Heels elevated off bed. CHG bath given. Pillow supports utilized. Waffle mattress under pt.

## 2019-10-06 LAB
BACTERIA BRONCH AEROBE CULT: NORMAL
GRAM STN SPEC: NORMAL
SIGNIFICANT IND 70042: NORMAL
SITE SITE: NORMAL
SOURCE SOURCE: NORMAL

## 2019-10-06 NOTE — PROGRESS NOTES
Later this evening, I met with Kriss Stinson, his POA.  I advised her of his worsening respiratory status and difficulty providing adequate oxygenation.  After extensive discussion, she agrees that continuing support would not be in accordance with this gentlemen's previously expressed wishes.  She wishes to transition to comfort measures only.  At 0800 hours this morning, I stop his vecuronium.  On train-of-four testing, he only has 1 out of 4 twitches.  At this point, I will stop his CRRT and stop all of his vasopressors and medications.  I will continue to ventilate him on the ventilator, but I will place him on room air.  I will provide morphine and Ativan intravenously as necessary to maintain his comfort.    Mike Arndt MD  Pulmonary and Critical Care Medicine

## 2019-10-06 NOTE — DISCHARGE SUMMARY
Death Summary    Cause of Death  Septic shock due to pneumonia due to undetermined etiology.    Comorbid Conditions at the Time of Death  Principal Problem:    Acute respiratory failure with hypoxia and hypercapnia (HCC) POA: Yes  Active Problems:    Septic shock (HCC) POA: Unknown    Pneumonia POA: Unknown    Alcohol withdrawal syndrome with perceptual disturbance (HCC) POA: Yes    Pulmonary hypertension (HCC) POA: Yes    Cirrhosis (HCC) POA: Yes    High anion gap metabolic acidosis POA: Yes    Acute kidney injury (HCC) POA: Unknown    Alcohol abuse POA: Unknown    Atelectasis POA: Unknown  Resolved Problems:    * No resolved hospital problems. *      History of Presenting Illness and Hospital Course  This is a 63 y.o. male admitted 10/1/2019 with shortness of breath and increasing edema.  This gentleman has a history of cirrhosis, LITTLE and alcohol abuse.  He was admitted with a diagnosis of respiratory failure, right heart failure, shock and cirrhosis.  He required intubation and mechanical ventilatory support for his respiratory failure.  He developed septic shock requiring high-dose vasopressor support.  The source of his sepsis was pneumonia.  He was placed on broad-spectrum antimicrobial chemotherapy.  He developed oliguric acute renal failure and required CRRT.  Over the course of his hospitalization, he remained critically ill on full mechanical ventilatory support as well as vasopressor support and CRRT.  On the day of death, he developed worsening hypoxemia with inability to provide adequate oxygenation despite all aggressive measures.  I held discussions with his POA.  She told me that this gentleman would not want to continue with aggressive support with his current deteriorating status and extremely poor prognosis.  After extensive discussion, she decided to transition to comfort measures only and he  peacefully.    Mike Arndt MD  Pulmonary and Critical Care Medicine

## 2019-10-06 NOTE — PROGRESS NOTES
1945: conference with FRED Monterroso and Dr. Arndt. Decided to withdraw care.  1950: morphine pushed and pressors stopped.   2000: TOD. Pt in asystole. 2 RN pronounce - PARRIS Ventura RN and AUGUSTINE Medina RN.  2138[place] [facility] [place]

## 2019-10-06 NOTE — PROGRESS NOTES
Hemodialysis Notes:    0720: CRRT was terminated and stopped as ordered by Dr. Cheli Stanley. Patient will be on comfort care at this time.   Dr. Wilder was notified about the change in status and agreed to stop the CRRT treatment.   Blood returned and flushed the CVC port.     Report given to Primary RN.

## 2019-10-06 NOTE — CARE PLAN
Problem: Ventilation Defect:  Goal: Ability to achieve and maintain unassisted ventilation or tolerate decreased levels of ventilator support  Note:   Adult Ventilation Update    Total Vent Days: 4  CMV 30 400 18+ 100%    Patient Lines/Drains/Airways Status      Active Airway       Name: Placement date: Placement time: Site: Days:    Airway ETT Oral 8.0 @ 23  10/02/19   0005   Oral  3                      PaO2 goal of 55    Flolan at .05mcg

## 2019-10-07 LAB
BACTERIA BLD CULT: NORMAL
BACTERIA BLD CULT: NORMAL
SIGNIFICANT IND 70042: NORMAL
SIGNIFICANT IND 70042: NORMAL
SITE SITE: NORMAL
SITE SITE: NORMAL
SOURCE SOURCE: NORMAL
SOURCE SOURCE: NORMAL

## 2019-10-08 LAB — CYTOLOGY REG CYTOL: NORMAL

## 2019-10-30 LAB
FUNGUS SPEC CULT: NORMAL
FUNGUS SPEC CULT: NORMAL
SIGNIFICANT IND 70042: NORMAL
SIGNIFICANT IND 70042: NORMAL
SITE SITE: NORMAL
SITE SITE: NORMAL
SOURCE SOURCE: NORMAL
SOURCE SOURCE: NORMAL

## 2019-11-30 LAB
MYCOBACTERIUM SPEC CULT: NORMAL
RHODAMINE-AURAMINE STN SPEC: NORMAL
SIGNIFICANT IND 70042: NORMAL
SITE SITE: NORMAL
SOURCE SOURCE: NORMAL

## 2022-01-01 NOTE — PROGRESS NOTES
Kriss is listed as health care agent per Advanced Directive which was scanned into system on 10/1/19.  Please dont provide information to other people unless Kriss verbalizes approval   forehead

## 2023-03-01 ENCOUNTER — RX ONLY (OUTPATIENT)
Age: 68
Setting detail: RX ONLY
End: 2023-03-01

## 2023-03-08 ENCOUNTER — RX ONLY (OUTPATIENT)
Age: 68
Setting detail: RX ONLY
End: 2023-03-08

## 2023-04-08 ENCOUNTER — RX ONLY (OUTPATIENT)
Age: 68
Setting detail: RX ONLY
End: 2023-04-08

## 2023-10-25 ENCOUNTER — RX ONLY (OUTPATIENT)
Age: 68
Setting detail: RX ONLY
End: 2023-10-25

## 2023-11-09 ENCOUNTER — APPOINTMENT (RX ONLY)
Dept: URBAN - METROPOLITAN AREA CLINIC 45 | Facility: CLINIC | Age: 68
Setting detail: DERMATOLOGY
End: 2023-11-09

## 2023-11-09 DIAGNOSIS — L82.1 OTHER SEBORRHEIC KERATOSIS: ICD-10-CM

## 2023-11-09 DIAGNOSIS — L81.4 OTHER MELANIN HYPERPIGMENTATION: ICD-10-CM

## 2023-11-09 DIAGNOSIS — D18.0 HEMANGIOMA: ICD-10-CM

## 2023-11-09 DIAGNOSIS — D22 MELANOCYTIC NEVI: ICD-10-CM

## 2023-11-09 DIAGNOSIS — L57.0 ACTINIC KERATOSIS: ICD-10-CM | Status: INADEQUATELY CONTROLLED

## 2023-11-09 PROBLEM — D48.5 NEOPLASM OF UNCERTAIN BEHAVIOR OF SKIN: Status: ACTIVE | Noted: 2023-11-09

## 2023-11-09 PROBLEM — D18.01 HEMANGIOMA OF SKIN AND SUBCUTANEOUS TISSUE: Status: ACTIVE | Noted: 2023-11-09

## 2023-11-09 PROBLEM — D22.5 MELANOCYTIC NEVI OF TRUNK: Status: ACTIVE | Noted: 2023-11-09

## 2023-11-09 PROCEDURE — 99203 OFFICE O/P NEW LOW 30 MIN: CPT | Mod: 25

## 2023-11-09 PROCEDURE — ? MEDICARE ABN

## 2023-11-09 PROCEDURE — ? COUNSELING

## 2023-11-09 PROCEDURE — 11102 TANGNTL BX SKIN SINGLE LES: CPT | Mod: 59

## 2023-11-09 PROCEDURE — ? BIOPSY BY SHAVE METHOD

## 2023-11-09 PROCEDURE — ? FULL BODY SKIN EXAM

## 2023-11-09 PROCEDURE — ? LIQUID NITROGEN

## 2023-11-09 PROCEDURE — ? TREATMENT REGIMEN

## 2023-11-09 PROCEDURE — 17004 DESTROY PREMAL LESIONS 15/>: CPT

## 2023-11-09 ASSESSMENT — LOCATION DETAILED DESCRIPTION DERM
LOCATION DETAILED: LEFT PROXIMAL DORSAL FOREARM
LOCATION DETAILED: LEFT SUPERIOR LATERAL FOREHEAD
LOCATION DETAILED: RIGHT RADIAL DORSAL HAND
LOCATION DETAILED: LEFT SUPERIOR MEDIAL FOREHEAD
LOCATION DETAILED: LEFT ULNAR DORSAL HAND
LOCATION DETAILED: LEFT DORSAL MIDDLE METACARPOPHALANGEAL JOINT
LOCATION DETAILED: RIGHT ULNAR DORSAL HAND
LOCATION DETAILED: RIGHT LATERAL MALAR CHEEK
LOCATION DETAILED: INFERIOR THORACIC SPINE
LOCATION DETAILED: RIGHT DISTAL DORSAL FOREARM
LOCATION DETAILED: LEFT FOREHEAD
LOCATION DETAILED: LEFT MEDIAL FOREHEAD
LOCATION DETAILED: LEFT DISTAL DORSAL FOREARM
LOCATION DETAILED: LEFT RADIAL DORSAL HAND
LOCATION DETAILED: EPIGASTRIC SKIN
LOCATION DETAILED: STERNAL NOTCH
LOCATION DETAILED: LEFT SUPERIOR FOREHEAD
LOCATION DETAILED: RIGHT MEDIAL UPPER BACK
LOCATION DETAILED: RIGHT POSTERIOR SHOULDER
LOCATION DETAILED: LEFT DISTAL POSTERIOR UPPER ARM
LOCATION DETAILED: LEFT POSTERIOR SHOULDER

## 2023-11-09 ASSESSMENT — LOCATION ZONE DERM
LOCATION ZONE: TRUNK
LOCATION ZONE: HAND
LOCATION ZONE: FACE
LOCATION ZONE: ARM

## 2023-11-09 ASSESSMENT — LOCATION SIMPLE DESCRIPTION DERM
LOCATION SIMPLE: LEFT FOREARM
LOCATION SIMPLE: RIGHT UPPER BACK
LOCATION SIMPLE: CHEST
LOCATION SIMPLE: RIGHT SHOULDER
LOCATION SIMPLE: LEFT UPPER ARM
LOCATION SIMPLE: LEFT HAND
LOCATION SIMPLE: RIGHT FOREARM
LOCATION SIMPLE: ABDOMEN
LOCATION SIMPLE: LEFT FOREHEAD
LOCATION SIMPLE: RIGHT CHEEK
LOCATION SIMPLE: RIGHT HAND
LOCATION SIMPLE: LEFT SHOULDER
LOCATION SIMPLE: UPPER BACK

## 2023-11-09 NOTE — PROCEDURE: LIQUID NITROGEN
Detail Level: Simple
Render Post-Care Instructions In Note?: no
Show Aperture Variable?: Yes
Consent: The patient's consent was obtained including but not limited to risks of crusting, scabbing, blistering, scarring, darker or lighter pigmentary change, recurrence, incomplete removal and infection.
Application Tool (Optional): Cry-AC
Post-Care Instructions: I reviewed with the patient in detail post-care instructions. Patient is to wear sunprotection, and avoid picking at any of the treated lesions. Pt may apply Vaseline to crusted or scabbing areas.
Duration Of Freeze Thaw-Cycle (Seconds): 0

## 2023-11-09 NOTE — PROCEDURE: BIOPSY BY SHAVE METHOD
Body Location Override (Optional - Billing Will Still Be Based On Selected Body Map Location If Applicable): Right proximal lateral anterior arm
Detail Level: Detailed
Depth Of Biopsy: dermis
Was A Bandage Applied: Yes
Size Of Lesion In Cm: 0
Biopsy Type: H and E
Biopsy Method: Personna blade
Anesthesia Type: 1% lidocaine with epinephrine
Anesthesia Volume In Cc: 0.5
Hemostasis: Drysol
Wound Care: Petrolatum
Dressing: bandage
Destruction After The Procedure: No
Type Of Destruction Used: Curettage
Curettage Text: The wound bed was treated with curettage after the biopsy was performed.
Cryotherapy Text: The wound bed was treated with cryotherapy after the biopsy was performed.
Electrodesiccation Text: The wound bed was treated with electrodesiccation after the biopsy was performed.
Electrodesiccation And Curettage Text: The wound bed was treated with electrodesiccation and curettage after the biopsy was performed.
Silver Nitrate Text: The wound bed was treated with silver nitrate after the biopsy was performed.
Lab: 6
Lab Facility: [id number]
Consent: Written consent was obtained and risks were reviewed including but not limited to scarring, infection, bleeding, scabbing, incomplete removal, nerve damage and allergy to anesthesia.
Post-Care Instructions: I reviewed with the patient in detail post-care instructions. Patient is to keep the biopsy site dry overnight, and then apply bacitracin twice daily until healed. Patient may apply hydrogen peroxide soaks to remove any crusting.
Notification Instructions: Patient will be notified of biopsy results. However, patient instructed to call the office if not contacted within 2 weeks.
Billing Type: Third-Party Bill
Information: Selecting Yes will display possible errors in your note based on the variables you have selected. This validation is only offered as a suggestion for you. PLEASE NOTE THAT THE VALIDATION TEXT WILL BE REMOVED WHEN YOU FINALIZE YOUR NOTE. IF YOU WANT TO FAX A PRELIMINARY NOTE YOU WILL NEED TO TOGGLE THIS TO 'NO' IF YOU DO NOT WANT IT IN YOUR FAXED NOTE.

## 2023-11-09 NOTE — PROCEDURE: MEDICARE ABN
Procedure (Limit To 20 Characters): LN2
Reason?: non-covered service
Payment Option: Option 2: Don't Bill Medicare, patient responsible for payment. Patient cannot appeal Medicare if billed.
Detail Level: Detailed
Reason?: Additional Information

## 2024-04-25 ENCOUNTER — APPOINTMENT (RX ONLY)
Dept: URBAN - METROPOLITAN AREA CLINIC 45 | Facility: CLINIC | Age: 69
Setting detail: DERMATOLOGY
End: 2024-04-25

## 2024-04-25 DIAGNOSIS — L82.0 INFLAMED SEBORRHEIC KERATOSIS: ICD-10-CM

## 2024-04-25 DIAGNOSIS — L57.0 ACTINIC KERATOSIS: ICD-10-CM

## 2024-04-25 DIAGNOSIS — D22 MELANOCYTIC NEVI: ICD-10-CM

## 2024-04-25 DIAGNOSIS — D18.0 HEMANGIOMA: ICD-10-CM

## 2024-04-25 DIAGNOSIS — L57.8 OTHER SKIN CHANGES DUE TO CHRONIC EXPOSURE TO NONIONIZING RADIATION: ICD-10-CM | Status: INADEQUATELY CONTROLLED

## 2024-04-25 DIAGNOSIS — L81.4 OTHER MELANIN HYPERPIGMENTATION: ICD-10-CM

## 2024-04-25 DIAGNOSIS — L82.1 OTHER SEBORRHEIC KERATOSIS: ICD-10-CM

## 2024-04-25 PROBLEM — D18.01 HEMANGIOMA OF SKIN AND SUBCUTANEOUS TISSUE: Status: ACTIVE | Noted: 2024-04-25

## 2024-04-25 PROBLEM — D22.5 MELANOCYTIC NEVI OF TRUNK: Status: ACTIVE | Noted: 2024-04-25

## 2024-04-25 PROCEDURE — ? PHOTO-DOCUMENTATION

## 2024-04-25 PROCEDURE — ? SUNSCREEN RECOMMENDATIONS

## 2024-04-25 PROCEDURE — ? ADDITIONAL NOTES

## 2024-04-25 PROCEDURE — 99213 OFFICE O/P EST LOW 20 MIN: CPT | Mod: 25

## 2024-04-25 PROCEDURE — ? PHOTODYNAMIC THERAPY COUNSELING

## 2024-04-25 PROCEDURE — ? ORDER FOR PHOTODYNAMIC THERAPY

## 2024-04-25 PROCEDURE — ? FULL BODY SKIN EXAM

## 2024-04-25 PROCEDURE — ? LIQUID NITROGEN

## 2024-04-25 PROCEDURE — 17000 DESTRUCT PREMALG LESION: CPT

## 2024-04-25 PROCEDURE — 17003 DESTRUCT PREMALG LES 2-14: CPT

## 2024-04-25 PROCEDURE — ? OBSERVATION

## 2024-04-25 PROCEDURE — ? COUNSELING

## 2024-04-25 ASSESSMENT — LOCATION SIMPLE DESCRIPTION DERM
LOCATION SIMPLE: LEFT FOREARM
LOCATION SIMPLE: UPPER BACK
LOCATION SIMPLE: LEFT SHOULDER
LOCATION SIMPLE: RIGHT LOWER BACK
LOCATION SIMPLE: LEFT FOREHEAD
LOCATION SIMPLE: LEFT HAND
LOCATION SIMPLE: CHEST
LOCATION SIMPLE: NECK
LOCATION SIMPLE: LEFT ANTERIOR NECK
LOCATION SIMPLE: RIGHT FOREARM
LOCATION SIMPLE: ABDOMEN
LOCATION SIMPLE: RIGHT CHEEK

## 2024-04-25 ASSESSMENT — LOCATION DETAILED DESCRIPTION DERM
LOCATION DETAILED: LEFT ANTERIOR SHOULDER
LOCATION DETAILED: LEFT RADIAL DORSAL HAND
LOCATION DETAILED: RIGHT CENTRAL LATERAL NECK
LOCATION DETAILED: LEFT PROXIMAL DORSAL FOREARM
LOCATION DETAILED: SUPERIOR THORACIC SPINE
LOCATION DETAILED: LEFT CLAVICULAR NECK
LOCATION DETAILED: RIGHT PROXIMAL DORSAL FOREARM
LOCATION DETAILED: INFERIOR THORACIC SPINE
LOCATION DETAILED: RIGHT INFERIOR MEDIAL MIDBACK
LOCATION DETAILED: LEFT MEDIAL FOREHEAD
LOCATION DETAILED: EPIGASTRIC SKIN
LOCATION DETAILED: LEFT LATERAL SUPERIOR CHEST
LOCATION DETAILED: RIGHT CENTRAL MALAR CHEEK
LOCATION DETAILED: RIGHT LATERAL SUPERIOR CHEST
LOCATION DETAILED: LEFT FOREHEAD
LOCATION DETAILED: RIGHT SUPERIOR LATERAL NECK

## 2024-04-25 ASSESSMENT — LOCATION ZONE DERM
LOCATION ZONE: NECK
LOCATION ZONE: FACE
LOCATION ZONE: HAND
LOCATION ZONE: ARM
LOCATION ZONE: TRUNK

## 2024-04-25 NOTE — PROCEDURE: LIQUID NITROGEN
Detail Level: Simple
Render Post-Care Instructions In Note?: no
Show Aperture Variable?: Yes
Consent: The patient's consent was obtained including but not limited to risks of crusting, scabbing, blistering, scarring, darker or lighter pigmentary change, recurrence, incomplete removal and infection.
Application Tool (Optional): Cry-AC
Duration Of Freeze Thaw-Cycle (Seconds): 0
Post-Care Instructions: I reviewed with the patient in detail post-care instructions. Patient is to wear sunprotection, and avoid picking at any of the treated lesions. Pt may apply Vaseline to crusted or scabbing areas.

## 2024-04-25 NOTE — PROCEDURE: ORDER FOR PHOTODYNAMIC THERAPY
Face Incubation Time: 1 to 1.5 Hours
Back Incubation Time: 2 Hours
Face And Scalp Incubation Time: 1 Hour for the face and 2 Hours for the scalp
Location: Face, Ears and Scalp
Neck Incubation Time: 1 Hour
Consent: The procedure and risks were reviewed with the patient including but not limited to: burning, pigmentary changes, pain, blistering, scabbing, redness, and the possibility of needing numerous treatments. Strict photoprotection after the procedure was also discussed.
Detail Level: Simple
Frequency Of Pdt: Two treatments 8 weeks apart
Pdt Type: JUNIOR-U
Debridement: No
Photosensitizer: Levulan

## 2024-04-25 NOTE — PROCEDURE: ADDITIONAL NOTES
Render Risk Assessment In Note?: no
Additional Notes: Patient states lesion was scratched off when he was asleep, will recheck site at follow up. Biopsy if not resolved.
Detail Level: Simple

## 2024-06-04 ENCOUNTER — APPOINTMENT (RX ONLY)
Dept: URBAN - METROPOLITAN AREA CLINIC 45 | Facility: CLINIC | Age: 69
Setting detail: DERMATOLOGY
End: 2024-06-04

## 2024-06-04 DIAGNOSIS — L57.0 ACTINIC KERATOSIS: ICD-10-CM

## 2024-06-04 PROCEDURE — ? PDT: BLUE

## 2024-06-04 PROCEDURE — 96573 PDT DSTR PRMLG LES PHYS/QHP: CPT

## 2024-06-04 ASSESSMENT — LOCATION SIMPLE DESCRIPTION DERM
LOCATION SIMPLE: RIGHT EAR
LOCATION SIMPLE: LEFT EAR
LOCATION SIMPLE: LEFT CHEEK
LOCATION SIMPLE: RIGHT CHEEK

## 2024-06-04 ASSESSMENT — LOCATION DETAILED DESCRIPTION DERM
LOCATION DETAILED: LEFT SUPERIOR HELIX
LOCATION DETAILED: RIGHT SUPERIOR HELIX
LOCATION DETAILED: RIGHT CENTRAL MALAR CHEEK
LOCATION DETAILED: LEFT LATERAL MALAR CHEEK

## 2024-06-04 ASSESSMENT — LOCATION ZONE DERM
LOCATION ZONE: FACE
LOCATION ZONE: EAR

## 2024-06-04 NOTE — PROCEDURE: PDT: BLUE
Consent: Written consent obtained.  The risks were reviewed with the patient including but not limited to: pigmentary changes, pain, blistering, scabbing, redness, and the remote possibility of scarring.
Illumination Time: 00:16:40
Lot # (Optional): [id number]
Debridement Text (Will Only Render In Visit Note If You Select Debridement Option Under Who Performed The Pdt Field): Prior to application of the photodynamic medication the hyperkeratotic lesions were curetted to make them more amenable to therapy.
Who Performed The Pdt (Provider): 
Post-Care Instructions: I reviewed with the patient in detail post-care instructions. Patient is to avoid sunlight for the next 2 days, and wear sun protection. Patients may expect sunburn like redness, discomfort and scabbing.
Who Performed The Pdt?: Performed by MD BROOKLYN, ISI or NP ([id number])
Light Source: Demetrius-U
Incubation Time: 1.5 hours
Treatment Number: 1
Medical Necessity: Precancerous Lesions
Occlusion: No
Expiration Date (Optional): 12/2026
Show Anesthesia In Plan?: Yes
Incubation Start Time: 10:10
Anesthesia Type: 1% lidocaine with epinephrine
Pre-Procedure Text: The treatment areas were cleaned and prepped in the usual fashion.
Ndc# (Optional): 69279-379-87
Which Photosensitizer Was Used: Levulan
Incubation End Time: 11:40
Total Number Of Aks Treated (Optional To Report): 0
Detail Level: Zone

## 2024-08-01 ENCOUNTER — APPOINTMENT (RX ONLY)
Dept: URBAN - METROPOLITAN AREA CLINIC 45 | Facility: CLINIC | Age: 69
Setting detail: DERMATOLOGY
End: 2024-08-01

## 2024-08-01 DIAGNOSIS — L57.0 ACTINIC KERATOSIS: ICD-10-CM | Status: IMPROVED

## 2024-08-01 PROCEDURE — ? LIQUID NITROGEN

## 2024-08-01 PROCEDURE — 17003 DESTRUCT PREMALG LES 2-14: CPT

## 2024-08-01 PROCEDURE — 17000 DESTRUCT PREMALG LESION: CPT

## 2024-08-01 PROCEDURE — ? FULL BODY SKIN EXAM - DECLINED

## 2024-08-01 PROCEDURE — ? COUNSELING

## 2024-08-01 ASSESSMENT — LOCATION DETAILED DESCRIPTION DERM
LOCATION DETAILED: LEFT CENTRAL ZYGOMA
LOCATION DETAILED: RIGHT FOREHEAD
LOCATION DETAILED: LEFT SUPERIOR FOREHEAD
LOCATION DETAILED: RIGHT SUPERIOR MEDIAL FOREHEAD
LOCATION DETAILED: LEFT MEDIAL FOREHEAD
LOCATION DETAILED: LEFT INFERIOR LATERAL FOREHEAD
LOCATION DETAILED: LEFT FOREHEAD
LOCATION DETAILED: LEFT INFERIOR FOREHEAD

## 2024-08-01 ASSESSMENT — LOCATION SIMPLE DESCRIPTION DERM
LOCATION SIMPLE: RIGHT FOREHEAD
LOCATION SIMPLE: LEFT FOREHEAD
LOCATION SIMPLE: LEFT ZYGOMA

## 2024-08-01 ASSESSMENT — LOCATION ZONE DERM: LOCATION ZONE: FACE

## 2024-08-01 NOTE — PROCEDURE: LIQUID NITROGEN
Number Of Freeze-Thaw Cycles: 3 freeze-thaw cycles
Render Post-Care Instructions In Note?: no
Show Aperture Variable?: Yes
Consent: The patient's consent was obtained including but not limited to risks of crusting, scabbing, blistering, scarring, darker or lighter pigmentary change, recurrence, incomplete removal and infection.
Detail Level: Simple
Application Tool (Optional): Cry-AC
Duration Of Freeze Thaw-Cycle (Seconds): 3
Post-Care Instructions: I reviewed with the patient in detail post-care instructions. Patient is to wear sunprotection, and avoid picking at any of the treated lesions. Pt may apply Vaseline to crusted or scabbing areas.

## 2024-08-08 ENCOUNTER — DASHBOARD ENCOUNTERS (OUTPATIENT)
Age: 69
End: 2024-08-08

## 2024-08-13 ENCOUNTER — OFFICE VISIT (OUTPATIENT)
Dept: URBAN - METROPOLITAN AREA CLINIC 82 | Facility: CLINIC | Age: 69
End: 2024-08-13

## 2024-09-04 ENCOUNTER — OFFICE VISIT (OUTPATIENT)
Dept: URBAN - METROPOLITAN AREA CLINIC 82 | Facility: CLINIC | Age: 69
End: 2024-09-04
Payer: COMMERCIAL

## 2024-09-04 ENCOUNTER — LAB OUTSIDE AN ENCOUNTER (OUTPATIENT)
Dept: URBAN - METROPOLITAN AREA CLINIC 82 | Facility: CLINIC | Age: 69
End: 2024-09-04

## 2024-09-04 VITALS
WEIGHT: 185.6 LBS | SYSTOLIC BLOOD PRESSURE: 110 MMHG | BODY MASS INDEX: 25.14 KG/M2 | DIASTOLIC BLOOD PRESSURE: 68 MMHG | TEMPERATURE: 98.1 F | HEART RATE: 109 BPM | HEIGHT: 72 IN

## 2024-09-04 DIAGNOSIS — D64.89 OTHER SPECIFIED ANEMIAS: ICD-10-CM

## 2024-09-04 DIAGNOSIS — R11.2 NAUSEA AND VOMITING IN ADULT: ICD-10-CM

## 2024-09-04 DIAGNOSIS — Z12.11 COLON CANCER SCREENING: ICD-10-CM

## 2024-09-04 DIAGNOSIS — K90.0 CELIAC DISEASE: ICD-10-CM

## 2024-09-04 PROBLEM — 396331005: Status: ACTIVE | Noted: 2024-09-04

## 2024-09-04 PROBLEM — 711150003: Status: ACTIVE | Noted: 2024-09-04

## 2024-09-04 PROCEDURE — 99204 OFFICE O/P NEW MOD 45 MIN: CPT | Performed by: INTERNAL MEDICINE

## 2024-09-04 RX ORDER — PANTOPRAZOLE SODIUM 40 MG/1
1 TABLET TABLET, DELAYED RELEASE ORAL ONCE A DAY
Status: ACTIVE | COMMUNITY

## 2024-09-04 RX ORDER — WARFARIN SODIUM 7.5 MG/1
1 TABLET TABLET ORAL ONCE A DAY
Status: ACTIVE | COMMUNITY

## 2024-09-04 RX ORDER — FOLIC ACID 1 MG/1
1 TABLET TABLET ORAL ONCE A DAY
Status: ACTIVE | COMMUNITY

## 2024-09-04 RX ORDER — BETAXOLOL HYDROCHLORIDE 5.6 MG/ML
1 DROP INTO AFFECTED EYE SOLUTION/ DROPS OPHTHALMIC TWICE A DAY
Status: ACTIVE | COMMUNITY

## 2024-09-04 RX ORDER — PREDNISONE 10 MG/1
TAKE 4 TABS PO X 1 WEEK, TAKE 3 TABS PO X 1 WEEK, TAKE 2 TABS PO X 1 WEEK, TAKE 1 TAB PO X 1 WEEK TABLET ORAL ONCE A DAY
Status: ACTIVE | COMMUNITY

## 2024-09-04 RX ORDER — METHOCARBAMOL 500 MG/1
1.5 TABLETS TABLET ORAL
Status: ACTIVE | COMMUNITY

## 2024-09-04 RX ORDER — METHADONE HYDROCHLORIDE 5 MG/1
1 TABLET TABLET ORAL ONCE A DAY
Status: ACTIVE | COMMUNITY

## 2024-09-04 RX ORDER — GABAPENTIN 800 MG/1
1 TABLET TABLET, FILM COATED ORAL ONCE A DAY
Status: ACTIVE | COMMUNITY

## 2024-09-04 RX ORDER — ROSUVASTATIN CALCIUM 5 MG/1
1 TABLET TABLET, COATED ORAL ONCE A DAY
Status: ACTIVE | COMMUNITY

## 2024-09-04 RX ORDER — WARFARIN SODIUM 5 MG/1
1 TABLET TABLET ORAL ONCE A DAY
Status: ACTIVE | COMMUNITY

## 2024-09-04 RX ORDER — FERROUS SULFATE TAB EC 325 MG (65 MG FE EQUIVALENT) 325 (65 FE) MG
1 TABLET TABLET DELAYED RESPONSE ORAL
Status: ACTIVE | COMMUNITY

## 2024-09-04 NOTE — HPI-TODAY'S VISIT:
CT abd/pel NS 7/2024, no GI abnml, gallstones, stable renal lesion, MRI abd NS '24 w fatty liver. LFT nml. has had episodes of fever/n/v/d, can be every 6 mo and Asx in between, mult work ups in past with imaging, infectious disease, has pulmonary disease and on warfarin w Dr. 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 nml BM. reports ~due for colonoscopy screening. hx celiac disease, on GFD. mild anemia.

## 2024-09-10 LAB
A/G RATIO: 1.8
ALBUMIN: 4.4
ALKALINE PHOSPHATASE: 60
ALT (SGPT): 16
AST (SGOT): 17
BILIRUBIN, TOTAL: 0.6
BUN/CREATININE RATIO: (no result)
BUN: 8
CALCIUM: 10.4
CARBON DIOXIDE, TOTAL: 31
CHLORIDE: 99
CREATININE: 0.79
EGFR: 97
FERRITIN, SERUM: 37
FOLATE, SERUM: >24
GLOBULIN, TOTAL: 2.4
GLUCOSE: 102
HEMATOCRIT: 43.7
HEMOGLOBIN: 13.7
IMMUNOGLOBULIN A, QN, SERUM: 116
INTERPRETATION: (no result)
IRON BIND.CAP.(TIBC): 380
IRON SATURATION: 12
IRON: 47
LIPASE: 24
MCH: 28.3
MCHC: 31.4
MCV: 90.3
MPV: 9.9
PLATELET COUNT: 293
POTASSIUM: 4.3
PROTEIN, TOTAL: 6.8
RDW: 16.8
RED BLOOD CELL COUNT: 4.84
SODIUM: 138
T-TRANSGLUTAMINASE (TTG) IGA: <1
TSH W/REFLEX TO FT4: 0.67
VITAMIN B12: 960
VITAMIN D,25-OH,TOTAL,IA: 39
WHITE BLOOD CELL COUNT: 7.9

## 2024-10-01 ENCOUNTER — TELEPHONE ENCOUNTER (OUTPATIENT)
Dept: URBAN - METROPOLITAN AREA CLINIC 82 | Facility: CLINIC | Age: 69
End: 2024-10-01

## 2024-10-25 ENCOUNTER — APPOINTMENT (RX ONLY)
Dept: URBAN - METROPOLITAN AREA CLINIC 45 | Facility: CLINIC | Age: 69
Setting detail: DERMATOLOGY
End: 2024-10-25

## 2024-10-25 DIAGNOSIS — D22 MELANOCYTIC NEVI: ICD-10-CM

## 2024-10-25 DIAGNOSIS — D18.0 HEMANGIOMA: ICD-10-CM

## 2024-10-25 DIAGNOSIS — L57.0 ACTINIC KERATOSIS: ICD-10-CM

## 2024-10-25 DIAGNOSIS — L81.4 OTHER MELANIN HYPERPIGMENTATION: ICD-10-CM

## 2024-10-25 DIAGNOSIS — Z86.14 PERSONAL HISTORY OF METHICILLIN RESISTANT STAPHYLOCOCCUS AUREUS INFECTION: ICD-10-CM

## 2024-10-25 DIAGNOSIS — L82.1 OTHER SEBORRHEIC KERATOSIS: ICD-10-CM

## 2024-10-25 PROBLEM — D48.5 NEOPLASM OF UNCERTAIN BEHAVIOR OF SKIN: Status: ACTIVE | Noted: 2024-10-25

## 2024-10-25 PROBLEM — D22.5 MELANOCYTIC NEVI OF TRUNK: Status: ACTIVE | Noted: 2024-10-25

## 2024-10-25 PROBLEM — D18.01 HEMANGIOMA OF SKIN AND SUBCUTANEOUS TISSUE: Status: ACTIVE | Noted: 2024-10-25

## 2024-10-25 PROCEDURE — 99213 OFFICE O/P EST LOW 20 MIN: CPT | Mod: 25

## 2024-10-25 PROCEDURE — ? SUNSCREEN RECOMMENDATIONS

## 2024-10-25 PROCEDURE — ? PRESCRIPTION

## 2024-10-25 PROCEDURE — 17003 DESTRUCT PREMALG LES 2-14: CPT

## 2024-10-25 PROCEDURE — ? FULL BODY SKIN EXAM

## 2024-10-25 PROCEDURE — ? LIQUID NITROGEN

## 2024-10-25 PROCEDURE — 17000 DESTRUCT PREMALG LESION: CPT | Mod: 59

## 2024-10-25 PROCEDURE — 11102 TANGNTL BX SKIN SINGLE LES: CPT

## 2024-10-25 PROCEDURE — ? BIOPSY BY SHAVE METHOD

## 2024-10-25 PROCEDURE — ? COUNSELING

## 2024-10-25 RX ORDER — CHLORHEXIDINE GLUCONATE 213 G/1000ML
SOLUTION TOPICAL
Qty: 236 | Refills: 3 | Status: ERX | COMMUNITY
Start: 2024-10-25

## 2024-10-25 RX ADMIN — CHLORHEXIDINE GLUCONATE: 213 SOLUTION TOPICAL at 00:00

## 2024-10-25 ASSESSMENT — LOCATION SIMPLE DESCRIPTION DERM
LOCATION SIMPLE: UPPER BACK
LOCATION SIMPLE: RIGHT FOREARM
LOCATION SIMPLE: LEFT FOREHEAD
LOCATION SIMPLE: CHEST
LOCATION SIMPLE: ABDOMEN
LOCATION SIMPLE: RIGHT CHEEK
LOCATION SIMPLE: RIGHT ZYGOMA
LOCATION SIMPLE: RIGHT INDEX FINGER
LOCATION SIMPLE: LEFT ZYGOMA
LOCATION SIMPLE: LEFT FOREARM
LOCATION SIMPLE: LEFT CHEEK

## 2024-10-25 ASSESSMENT — LOCATION DETAILED DESCRIPTION DERM
LOCATION DETAILED: RIGHT SUPERIOR LATERAL MALAR CHEEK
LOCATION DETAILED: RIGHT LATERAL ZYGOMA
LOCATION DETAILED: RIGHT PROXIMAL DORSAL FOREARM
LOCATION DETAILED: RIGHT INFERIOR CENTRAL MALAR CHEEK
LOCATION DETAILED: RIGHT LATERAL MALAR CHEEK
LOCATION DETAILED: LEFT FOREHEAD
LOCATION DETAILED: RIGHT MID PREAURICULAR CHEEK
LOCATION DETAILED: RIGHT DISTAL PALMAR INDEX FINGER
LOCATION DETAILED: LEFT LATERAL SUPERIOR CHEST
LOCATION DETAILED: INFERIOR THORACIC SPINE
LOCATION DETAILED: LEFT CENTRAL MALAR CHEEK
LOCATION DETAILED: RIGHT INFERIOR PREAURICULAR CHEEK
LOCATION DETAILED: RIGHT LATERAL SUPERIOR CHEST
LOCATION DETAILED: LEFT PROXIMAL DORSAL FOREARM
LOCATION DETAILED: RIGHT VENTRAL DISTAL FOREARM
LOCATION DETAILED: LEFT LATERAL ZYGOMA
LOCATION DETAILED: LEFT INFERIOR FOREHEAD
LOCATION DETAILED: RIGHT CENTRAL ZYGOMA
LOCATION DETAILED: RIGHT SUPERIOR CENTRAL MALAR CHEEK
LOCATION DETAILED: EPIGASTRIC SKIN
LOCATION DETAILED: SUPERIOR THORACIC SPINE

## 2024-10-25 ASSESSMENT — LOCATION ZONE DERM
LOCATION ZONE: FINGER
LOCATION ZONE: ARM
LOCATION ZONE: TRUNK
LOCATION ZONE: FACE

## 2024-10-25 NOTE — HPI: EVALUATION OF SKIN LESION(S)
What Type Of Note Output Would You Prefer (Optional)?: Bullet Format
Hpi Title: Evaluation of Skin Lesions
Additional History: Established patient  \\nPresenting for an evaluation of skin lesions \\nPatient has no areas of concern

## 2024-10-25 NOTE — PROCEDURE: LIQUID NITROGEN
Duration Of Freeze Thaw-Cycle (Seconds): 0
Post-Care Instructions: I reviewed with the patient in detail post-care instructions. Patient is to wear sunprotection, and avoid picking at any of the treated lesions. Pt may apply Vaseline to crusted or scabbing areas.
Application Tool (Optional): Cry-AC
Render Note In Bullet Format When Appropriate: No
Show Applicator Variable?: Yes
Consent: The patient's consent was obtained including but not limited to risks of crusting, scabbing, blistering, scarring, darker or lighter pigmentary change, recurrence, incomplete removal and infection.
Detail Level: Simple

## 2024-12-05 ENCOUNTER — TELEPHONE ENCOUNTER (OUTPATIENT)
Dept: URBAN - METROPOLITAN AREA CLINIC 63 | Facility: CLINIC | Age: 69
End: 2024-12-05

## 2024-12-10 ENCOUNTER — OFFICE VISIT (OUTPATIENT)
Dept: URBAN - METROPOLITAN AREA MEDICAL CENTER 31 | Facility: MEDICAL CENTER | Age: 69
End: 2024-12-10
Payer: COMMERCIAL

## 2024-12-10 DIAGNOSIS — K22.89 OTHER SPECIFIED DISEASE OF ESOPHAGUS: ICD-10-CM

## 2024-12-10 DIAGNOSIS — B37.81 CANDIDA: ICD-10-CM

## 2024-12-10 DIAGNOSIS — D12.0 ADENOMA OF CECUM: ICD-10-CM

## 2024-12-10 DIAGNOSIS — K90.0 ACD (ADULT CELIAC DISEASE): ICD-10-CM

## 2024-12-10 DIAGNOSIS — K31.89 OTHER DISEASES OF STOMACH AND DUODENUM: ICD-10-CM

## 2024-12-10 DIAGNOSIS — Z12.11 COLON CANCER SCREENING: ICD-10-CM

## 2024-12-10 PROCEDURE — 43239 EGD BIOPSY SINGLE/MULTIPLE: CPT | Performed by: INTERNAL MEDICINE

## 2024-12-10 PROCEDURE — 45385 COLONOSCOPY W/LESION REMOVAL: CPT | Performed by: INTERNAL MEDICINE

## 2024-12-13 ENCOUNTER — TELEPHONE ENCOUNTER (OUTPATIENT)
Dept: URBAN - METROPOLITAN AREA CLINIC 82 | Facility: CLINIC | Age: 69
End: 2024-12-13

## 2024-12-13 ENCOUNTER — TELEPHONE ENCOUNTER (OUTPATIENT)
Dept: URBAN - METROPOLITAN AREA CLINIC 115 | Facility: CLINIC | Age: 69
End: 2024-12-13

## 2024-12-13 RX ORDER — FLUCONAZOLE 200 MG/1
1 TABLET TABLET ORAL DAILY
Qty: 14 TABLET | OUTPATIENT
Start: 2024-12-13 | End: 2024-12-27

## 2024-12-18 ENCOUNTER — OFFICE VISIT (OUTPATIENT)
Dept: URBAN - METROPOLITAN AREA CLINIC 82 | Facility: CLINIC | Age: 69
End: 2024-12-18
Payer: COMMERCIAL

## 2024-12-18 VITALS
TEMPERATURE: 98.1 F | DIASTOLIC BLOOD PRESSURE: 81 MMHG | BODY MASS INDEX: 26.63 KG/M2 | HEIGHT: 72 IN | WEIGHT: 196.6 LBS | SYSTOLIC BLOOD PRESSURE: 130 MMHG | HEART RATE: 83 BPM

## 2024-12-18 DIAGNOSIS — B37.81 ESOPHAGEAL CANDIDIASIS: ICD-10-CM

## 2024-12-18 DIAGNOSIS — Z86.0101 PERSONAL HISTORY OF ADENOMATOUS AND SERRATED COLON POLYPS: ICD-10-CM

## 2024-12-18 DIAGNOSIS — D64.9 ANEMIA, UNSPECIFIED TYPE: ICD-10-CM

## 2024-12-18 PROBLEM — 20639004: Status: ACTIVE | Noted: 2024-12-18

## 2024-12-18 PROCEDURE — 99214 OFFICE O/P EST MOD 30 MIN: CPT | Performed by: STUDENT IN AN ORGANIZED HEALTH CARE EDUCATION/TRAINING PROGRAM

## 2024-12-18 RX ORDER — PREDNISONE 10 MG/1
TAKE 4 TABS PO X 1 WEEK, TAKE 3 TABS PO X 1 WEEK, TAKE 2 TABS PO X 1 WEEK, TAKE 1 TAB PO X 1 WEEK TABLET ORAL ONCE A DAY
Status: ACTIVE | COMMUNITY

## 2024-12-18 RX ORDER — ROSUVASTATIN CALCIUM 5 MG/1
1 TABLET TABLET, COATED ORAL ONCE A DAY
Status: ACTIVE | COMMUNITY

## 2024-12-18 RX ORDER — FOLIC ACID 1 MG/1
1 TABLET TABLET ORAL ONCE A DAY
Status: ACTIVE | COMMUNITY

## 2024-12-18 RX ORDER — FLUCONAZOLE 200 MG/1
1 TABLET TABLET ORAL DAILY
Qty: 14 TABLET | Status: ACTIVE | COMMUNITY
Start: 2024-12-13 | End: 2024-12-27

## 2024-12-18 RX ORDER — METHOCARBAMOL 500 MG/1
1.5 TABLETS TABLET ORAL
Status: ACTIVE | COMMUNITY

## 2024-12-18 RX ORDER — BETAXOLOL HYDROCHLORIDE 5.6 MG/ML
1 DROP INTO AFFECTED EYE SOLUTION/ DROPS OPHTHALMIC TWICE A DAY
Status: ACTIVE | COMMUNITY

## 2024-12-18 RX ORDER — FERROUS SULFATE TAB EC 325 MG (65 MG FE EQUIVALENT) 325 (65 FE) MG
1 TABLET TABLET DELAYED RESPONSE ORAL
Status: ACTIVE | COMMUNITY

## 2024-12-18 RX ORDER — WARFARIN SODIUM 5 MG/1
1 TABLET TABLET ORAL ONCE A DAY
Status: ACTIVE | COMMUNITY

## 2024-12-18 RX ORDER — PANTOPRAZOLE SODIUM 40 MG/1
1 TABLET TABLET, DELAYED RELEASE ORAL ONCE A DAY
Status: ACTIVE | COMMUNITY

## 2024-12-18 RX ORDER — METHADONE HYDROCHLORIDE 5 MG/1
1 TABLET TABLET ORAL ONCE A DAY
Status: ACTIVE | COMMUNITY

## 2024-12-18 RX ORDER — WARFARIN SODIUM 7.5 MG/1
1 TABLET TABLET ORAL ONCE A DAY
Status: ACTIVE | COMMUNITY

## 2024-12-18 RX ORDER — GABAPENTIN 800 MG/1
1 TABLET TABLET, FILM COATED ORAL ONCE A DAY
Status: ACTIVE | COMMUNITY

## 2024-12-19 ENCOUNTER — TELEPHONE ENCOUNTER (OUTPATIENT)
Dept: URBAN - METROPOLITAN AREA CLINIC 82 | Facility: CLINIC | Age: 69
End: 2024-12-19

## 2024-12-19 LAB
ABSOLUTE BASOPHILS: 32
ABSOLUTE EOSINOPHILS: 40
ABSOLUTE LYMPHOCYTES: 624
ABSOLUTE MONOCYTES: 400
ABSOLUTE NEUTROPHILS: 6904
BASOPHILS: 0.4
EOSINOPHILS: 0.5
HEMATOCRIT: 41.5
HEMOGLOBIN: 13.5
IRON BIND.CAP.(TIBC): 394
IRON SATURATION: 8
IRON: 33
LYMPHOCYTES: 7.8
MCH: 29.2
MCHC: 32.5
MCV: 89.8
MONOCYTES: 5
MPV: 10.7
NEUTROPHILS: 86.3
PLATELET COUNT: 264
RDW: 15.7
RED BLOOD CELL COUNT: 4.62
WHITE BLOOD CELL COUNT: 8

## 2025-02-13 ENCOUNTER — TELEPHONE ENCOUNTER (OUTPATIENT)
Dept: URBAN - METROPOLITAN AREA CLINIC 82 | Facility: CLINIC | Age: 70
End: 2025-02-13

## 2025-02-13 ENCOUNTER — OFFICE VISIT (OUTPATIENT)
Dept: URBAN - METROPOLITAN AREA CLINIC 82 | Facility: CLINIC | Age: 70
End: 2025-02-13

## 2025-02-13 VITALS — HEIGHT: 72 IN

## 2025-02-13 RX ORDER — BETAXOLOL HYDROCHLORIDE 5.6 MG/ML
1 DROP INTO AFFECTED EYE SOLUTION/ DROPS OPHTHALMIC TWICE A DAY
Status: ACTIVE | COMMUNITY

## 2025-02-13 RX ORDER — PREDNISONE 10 MG/1
TAKE 4 TABS PO X 1 WEEK, TAKE 3 TABS PO X 1 WEEK, TAKE 2 TABS PO X 1 WEEK, TAKE 1 TAB PO X 1 WEEK TABLET ORAL ONCE A DAY
Status: ACTIVE | COMMUNITY

## 2025-02-13 RX ORDER — WARFARIN SODIUM 7.5 MG/1
1 TABLET TABLET ORAL ONCE A DAY
Status: ACTIVE | COMMUNITY

## 2025-02-13 RX ORDER — METHOCARBAMOL 500 MG/1
1.5 TABLETS TABLET ORAL
Status: ACTIVE | COMMUNITY

## 2025-02-13 RX ORDER — FOLIC ACID 1 MG/1
1 TABLET TABLET ORAL ONCE A DAY
Status: ACTIVE | COMMUNITY

## 2025-02-13 RX ORDER — METHADONE HYDROCHLORIDE 5 MG/1
1 TABLET TABLET ORAL ONCE A DAY
Status: ACTIVE | COMMUNITY

## 2025-02-13 RX ORDER — FERROUS SULFATE TAB EC 325 MG (65 MG FE EQUIVALENT) 325 (65 FE) MG
1 TABLET TABLET DELAYED RESPONSE ORAL
Status: ACTIVE | COMMUNITY

## 2025-02-13 RX ORDER — WARFARIN SODIUM 5 MG/1
1 TABLET TABLET ORAL ONCE A DAY
Status: ACTIVE | COMMUNITY

## 2025-02-13 RX ORDER — ROSUVASTATIN CALCIUM 5 MG/1
1 TABLET TABLET, COATED ORAL ONCE A DAY
Status: ACTIVE | COMMUNITY

## 2025-02-13 RX ORDER — GABAPENTIN 800 MG/1
1 TABLET TABLET, FILM COATED ORAL ONCE A DAY
Status: ACTIVE | COMMUNITY

## 2025-02-13 RX ORDER — PANTOPRAZOLE SODIUM 40 MG/1
1 TABLET TABLET, DELAYED RELEASE ORAL ONCE A DAY
Status: ACTIVE | COMMUNITY

## 2025-03-31 ENCOUNTER — OFFICE VISIT (OUTPATIENT)
Dept: URBAN - METROPOLITAN AREA CLINIC 82 | Facility: CLINIC | Age: 70
End: 2025-03-31

## 2025-03-31 PROBLEM — 235919008: Status: ACTIVE | Noted: 2025-03-31

## 2025-03-31 RX ORDER — ROSUVASTATIN CALCIUM 5 MG/1
1 TABLET TABLET, COATED ORAL ONCE A DAY
Status: ACTIVE | COMMUNITY

## 2025-03-31 RX ORDER — FOLIC ACID 1 MG/1
1 TABLET TABLET ORAL ONCE A DAY
Status: ACTIVE | COMMUNITY

## 2025-03-31 RX ORDER — WARFARIN SODIUM 5 MG/1
1 TABLET TABLET ORAL ONCE A DAY
Status: ACTIVE | COMMUNITY

## 2025-03-31 RX ORDER — WARFARIN SODIUM 7.5 MG/1
1 TABLET TABLET ORAL ONCE A DAY
Status: ACTIVE | COMMUNITY

## 2025-03-31 RX ORDER — BETAXOLOL HYDROCHLORIDE 5.6 MG/ML
1 DROP INTO AFFECTED EYE SOLUTION/ DROPS OPHTHALMIC TWICE A DAY
Status: ACTIVE | COMMUNITY

## 2025-03-31 RX ORDER — PANTOPRAZOLE SODIUM 40 MG/1
1 TABLET TABLET, DELAYED RELEASE ORAL ONCE A DAY
Status: ACTIVE | COMMUNITY

## 2025-03-31 RX ORDER — METHADONE HYDROCHLORIDE 5 MG/1
1 TABLET TABLET ORAL ONCE A DAY
Status: ACTIVE | COMMUNITY

## 2025-03-31 RX ORDER — PREDNISONE 10 MG/1
TAKE 4 TABS PO X 1 WEEK, TAKE 3 TABS PO X 1 WEEK, TAKE 2 TABS PO X 1 WEEK, TAKE 1 TAB PO X 1 WEEK TABLET ORAL ONCE A DAY
Status: ACTIVE | COMMUNITY

## 2025-03-31 RX ORDER — OXYCODONE HYDROCHLORIDE AND ACETAMINOPHEN 7.5; 325 MG/1; MG/1
1 TABLET AS NEEDED TABLET ORAL
Status: ACTIVE | COMMUNITY

## 2025-03-31 RX ORDER — FERROUS SULFATE TAB EC 325 MG (65 MG FE EQUIVALENT) 325 (65 FE) MG
1 TABLET TABLET DELAYED RESPONSE ORAL
Status: ACTIVE | COMMUNITY

## 2025-03-31 RX ORDER — GABAPENTIN 800 MG/1
1 TABLET TABLET, FILM COATED ORAL ONCE A DAY
Status: ACTIVE | COMMUNITY

## 2025-03-31 RX ORDER — METHOCARBAMOL 500 MG/1
1.5 TABLETS TABLET ORAL
Status: ACTIVE | COMMUNITY

## 2025-03-31 NOTE — HPI-TODAY'S VISIT:
Had one additional episode, admitted with fever/sepsis unknown origin, and n/v. Egd/colonoscopy on 12/2024 that noted few white plaques, bx noted detached squamous cells associated w/ funal organisms, s/p fluconazole. Neg h pylori, GIM. Colonscopy noted 1 TA, repeat in 3yrs. Prior hx 9/2024 : CT abd/pel NS 7/2024, no GI abnml, gallstones, stable renal lesion, MRI abd NS '24 w fatty liver. LFT nml. has had episodes of fever/n/v/d, can be every 6 mo and Asx in between, mult work ups in past with imaging, infectious disease, has pulmonary disease and on warfarin w Dr. 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 nml BM. reports ~due for colonoscopy screening. hx celiac disease, on GFD. mild anemia.

## 2025-04-25 ENCOUNTER — APPOINTMENT (OUTPATIENT)
Dept: URBAN - METROPOLITAN AREA CLINIC 45 | Facility: CLINIC | Age: 70
Setting detail: DERMATOLOGY
End: 2025-04-25

## 2025-04-25 DIAGNOSIS — L82.1 OTHER SEBORRHEIC KERATOSIS: ICD-10-CM

## 2025-04-25 DIAGNOSIS — L57.0 ACTINIC KERATOSIS: ICD-10-CM

## 2025-04-25 DIAGNOSIS — D18.0 HEMANGIOMA: ICD-10-CM

## 2025-04-25 DIAGNOSIS — D22 MELANOCYTIC NEVI: ICD-10-CM

## 2025-04-25 DIAGNOSIS — L81.4 OTHER MELANIN HYPERPIGMENTATION: ICD-10-CM

## 2025-04-25 PROBLEM — D22.5 MELANOCYTIC NEVI OF TRUNK: Status: ACTIVE | Noted: 2025-04-25

## 2025-04-25 PROBLEM — D18.01 HEMANGIOMA OF SKIN AND SUBCUTANEOUS TISSUE: Status: ACTIVE | Noted: 2025-04-25

## 2025-04-25 PROCEDURE — ? FULL BODY SKIN EXAM

## 2025-04-25 PROCEDURE — 17004 DESTROY PREMAL LESIONS 15/>: CPT

## 2025-04-25 PROCEDURE — ? LIQUID NITROGEN

## 2025-04-25 PROCEDURE — 99213 OFFICE O/P EST LOW 20 MIN: CPT | Mod: 25

## 2025-04-25 PROCEDURE — ? COUNSELING

## 2025-04-25 PROCEDURE — ? SUNSCREEN RECOMMENDATIONS

## 2025-04-25 PROCEDURE — ? ORDER FOR PHOTODYNAMIC THERAPY

## 2025-04-25 ASSESSMENT — LOCATION SIMPLE DESCRIPTION DERM
LOCATION SIMPLE: UPPER BACK
LOCATION SIMPLE: RIGHT EAR
LOCATION SIMPLE: NECK
LOCATION SIMPLE: LEFT FOREHEAD
LOCATION SIMPLE: LEFT TEMPLE
LOCATION SIMPLE: SUPERIOR FOREHEAD
LOCATION SIMPLE: LEFT CHEEK
LOCATION SIMPLE: RIGHT FOREARM
LOCATION SIMPLE: LEFT EAR
LOCATION SIMPLE: CHEST
LOCATION SIMPLE: RIGHT ZYGOMA
LOCATION SIMPLE: ABDOMEN
LOCATION SIMPLE: RIGHT FOREHEAD
LOCATION SIMPLE: LEFT FOREARM
LOCATION SIMPLE: LEFT UPPER BACK
LOCATION SIMPLE: RIGHT TEMPLE
LOCATION SIMPLE: LEFT ZYGOMA
LOCATION SIMPLE: RIGHT CHEEK

## 2025-04-25 ASSESSMENT — LOCATION DETAILED DESCRIPTION DERM
LOCATION DETAILED: RIGHT MEDIAL FOREHEAD
LOCATION DETAILED: RIGHT INFERIOR FOREHEAD
LOCATION DETAILED: RIGHT SUPERIOR LATERAL MALAR CHEEK
LOCATION DETAILED: LEFT SUPERIOR LATERAL FOREHEAD
LOCATION DETAILED: LEFT FOREHEAD
LOCATION DETAILED: RIGHT SUPERIOR FOREHEAD
LOCATION DETAILED: RIGHT LATERAL SUPERIOR CHEST
LOCATION DETAILED: RIGHT MID PREAURICULAR CHEEK
LOCATION DETAILED: RIGHT PROXIMAL DORSAL FOREARM
LOCATION DETAILED: SUPERIOR THORACIC SPINE
LOCATION DETAILED: LEFT INFERIOR MEDIAL UPPER BACK
LOCATION DETAILED: RIGHT LATERAL ZYGOMA
LOCATION DETAILED: RIGHT SUPERIOR CENTRAL MALAR CHEEK
LOCATION DETAILED: LEFT SUPERIOR FOREHEAD
LOCATION DETAILED: LEFT INFERIOR CENTRAL MALAR CHEEK
LOCATION DETAILED: RIGHT FOREHEAD
LOCATION DETAILED: SUPERIOR MID FOREHEAD
LOCATION DETAILED: LEFT SUPERIOR LATERAL NECK
LOCATION DETAILED: LEFT SUPERIOR LATERAL UPPER BACK
LOCATION DETAILED: RIGHT LATERAL TEMPLE
LOCATION DETAILED: LEFT CENTRAL TEMPLE
LOCATION DETAILED: INFERIOR THORACIC SPINE
LOCATION DETAILED: LEFT SUPERIOR LATERAL MALAR CHEEK
LOCATION DETAILED: RIGHT CENTRAL ZYGOMA
LOCATION DETAILED: LEFT PROXIMAL DORSAL FOREARM
LOCATION DETAILED: RIGHT INFERIOR CENTRAL MALAR CHEEK
LOCATION DETAILED: EPIGASTRIC SKIN
LOCATION DETAILED: LEFT LATERAL SUPERIOR CHEST
LOCATION DETAILED: LEFT CENTRAL MALAR CHEEK
LOCATION DETAILED: LEFT SUPERIOR HELIX
LOCATION DETAILED: LEFT MEDIAL ZYGOMA
LOCATION DETAILED: RIGHT SUPERIOR HELIX
LOCATION DETAILED: LEFT SUPERIOR MEDIAL FOREHEAD

## 2025-04-25 ASSESSMENT — LOCATION ZONE DERM
LOCATION ZONE: NECK
LOCATION ZONE: FACE
LOCATION ZONE: TRUNK
LOCATION ZONE: ARM
LOCATION ZONE: EAR

## 2025-04-25 NOTE — PROCEDURE: ORDER FOR PHOTODYNAMIC THERAPY
Frequency Of Pdt: Every 6 months if needed
Feet Incubation Time: 2 Hours
Debridement: No
Photosensitizer: Levulan
Neck Incubation Time: 1 Hour
Pdt Type: JUNIOR-U
Face And Scalp Incubation Time: 1 Hour for the face and 2 Hours for the scalp
Face Incubation Time: 1 to 1.5 Hours
Detail Level: Simple
Location: Face
Occlusion: Yes
Consent: The procedure and risks were reviewed with the patient including but not limited to: burning, pigmentary changes, pain, blistering, scabbing, redness, and the possibility of needing numerous treatments. Strict photoprotection after the procedure was also discussed.

## 2025-04-25 NOTE — PROCEDURE: LIQUID NITROGEN
Post-Care Instructions: I reviewed with the patient in detail post-care instructions. Patient is to wear sunprotection, and avoid picking at any of the treated lesions. Pt may apply Vaseline to crusted or scabbing areas.
Render Post-Care Instructions In Note?: no
Duration Of Freeze Thaw-Cycle (Seconds): 0
Application Tool (Optional): Cry-AC
Show Aperture Variable?: Yes
Detail Level: Simple
Consent: The patient's consent was obtained including but not limited to risks of crusting, scabbing, blistering, scarring, darker or lighter pigmentary change, recurrence, incomplete removal and infection.

## 2025-04-25 NOTE — HPI: EVALUATION OF SKIN LESION(S)
What Type Of Note Output Would You Prefer (Optional)?: Bullet Format
Hpi Title: Evaluation of Skin Lesions
Additional History: Established patient \\nPresenting for an evaluation of skin lesions \\nPatient has a spot of concern on his back

## 2025-06-17 NOTE — TELEPHONE ENCOUNTER
I spoke to pt informed can complete ss at [facility]. Pt requested I inform wife.  Lvm. To wife informed pt was informed regarding sleep study being completed at [facility]    Routine safety standards initiated.  Routine nursing standards initiated.